# Patient Record
Sex: MALE | Employment: OTHER | URBAN - METROPOLITAN AREA
[De-identification: names, ages, dates, MRNs, and addresses within clinical notes are randomized per-mention and may not be internally consistent; named-entity substitution may affect disease eponyms.]

---

## 2017-06-07 DIAGNOSIS — C04.9 FLOOR OF MOUTH SQUAMOUS CELL CARCINOMA (HCC): Primary | ICD-10-CM

## 2017-09-28 ENCOUNTER — HOSPITAL ENCOUNTER (INPATIENT)
Age: 77
LOS: 5 days | Discharge: HOME | DRG: 847 | End: 2017-10-03
Attending: INTERNAL MEDICINE | Admitting: INTERNAL MEDICINE
Payer: MEDICARE

## 2017-09-28 ENCOUNTER — APPOINTMENT (OUTPATIENT)
Dept: GENERAL RADIOLOGY | Age: 77
DRG: 847 | End: 2017-09-28
Attending: INTERNAL MEDICINE
Payer: MEDICARE

## 2017-09-28 PROBLEM — C76.0 HEAD AND NECK CANCER (HCC): Status: ACTIVE | Noted: 2017-09-28

## 2017-09-28 LAB
ALBUMIN SERPL-MCNC: 3.1 G/DL (ref 3.5–5)
ALBUMIN/GLOB SERPL: 0.6 {RATIO} (ref 1.1–2.2)
ALP SERPL-CCNC: 109 U/L (ref 45–117)
ALT SERPL-CCNC: 21 U/L (ref 12–78)
ANION GAP SERPL CALC-SCNC: 6 MMOL/L (ref 5–15)
AST SERPL-CCNC: 16 U/L (ref 15–37)
BASOPHILS # BLD: 0 K/UL (ref 0–0.1)
BASOPHILS NFR BLD: 0 % (ref 0–1)
BILIRUB SERPL-MCNC: 0.3 MG/DL (ref 0.2–1)
BUN SERPL-MCNC: 32 MG/DL (ref 6–20)
BUN/CREAT SERPL: 15 (ref 12–20)
CALCIUM SERPL-MCNC: 9.2 MG/DL (ref 8.5–10.1)
CHLORIDE SERPL-SCNC: 104 MMOL/L (ref 97–108)
CO2 SERPL-SCNC: 27 MMOL/L (ref 21–32)
CREAT SERPL-MCNC: 2.12 MG/DL (ref 0.7–1.3)
EOSINOPHIL # BLD: 0.2 K/UL (ref 0–0.4)
EOSINOPHIL NFR BLD: 4 % (ref 0–7)
ERYTHROCYTE [DISTWIDTH] IN BLOOD BY AUTOMATED COUNT: 13.4 % (ref 11.5–14.5)
GLOBULIN SER CALC-MCNC: 5.2 G/DL (ref 2–4)
GLUCOSE BLD STRIP.AUTO-MCNC: 157 MG/DL (ref 65–100)
GLUCOSE BLD STRIP.AUTO-MCNC: 266 MG/DL (ref 65–100)
GLUCOSE SERPL-MCNC: 122 MG/DL (ref 65–100)
HCT VFR BLD AUTO: 33.3 % (ref 36.6–50.3)
HGB BLD-MCNC: 10.8 G/DL (ref 12.1–17)
LYMPHOCYTES # BLD: 1.5 K/UL (ref 0.8–3.5)
LYMPHOCYTES NFR BLD: 23 % (ref 12–49)
MCH RBC QN AUTO: 30.6 PG (ref 26–34)
MCHC RBC AUTO-ENTMCNC: 32.4 G/DL (ref 30–36.5)
MCV RBC AUTO: 94.3 FL (ref 80–99)
MONOCYTES # BLD: 0.6 K/UL (ref 0–1)
MONOCYTES NFR BLD: 10 % (ref 5–13)
NEUTS SEG # BLD: 4 K/UL (ref 1.8–8)
NEUTS SEG NFR BLD: 63 % (ref 32–75)
PLATELET # BLD AUTO: 246 K/UL (ref 150–400)
POTASSIUM SERPL-SCNC: 5 MMOL/L (ref 3.5–5.1)
PROT SERPL-MCNC: 8.3 G/DL (ref 6.4–8.2)
RBC # BLD AUTO: 3.53 M/UL (ref 4.1–5.7)
SERVICE CMNT-IMP: ABNORMAL
SERVICE CMNT-IMP: ABNORMAL
SODIUM SERPL-SCNC: 137 MMOL/L (ref 136–145)
WBC # BLD AUTO: 6.3 K/UL (ref 4.1–11.1)

## 2017-09-28 PROCEDURE — 02HV33Z INSERTION OF INFUSION DEVICE INTO SUPERIOR VENA CAVA, PERCUTANEOUS APPROACH: ICD-10-PCS | Performed by: INTERNAL MEDICINE

## 2017-09-28 PROCEDURE — 82962 GLUCOSE BLOOD TEST: CPT

## 2017-09-28 PROCEDURE — 85025 COMPLETE CBC W/AUTO DIFF WBC: CPT | Performed by: INTERNAL MEDICINE

## 2017-09-28 PROCEDURE — 76937 US GUIDE VASCULAR ACCESS: CPT

## 2017-09-28 PROCEDURE — 65270000029 HC RM PRIVATE

## 2017-09-28 PROCEDURE — 3E03305 INTRODUCTION OF OTHER ANTINEOPLASTIC INTO PERIPHERAL VEIN, PERCUTANEOUS APPROACH: ICD-10-PCS | Performed by: INTERNAL MEDICINE

## 2017-09-28 PROCEDURE — 36569 INSJ PICC 5 YR+ W/O IMAGING: CPT | Performed by: INTERNAL MEDICINE

## 2017-09-28 PROCEDURE — 80053 COMPREHEN METABOLIC PANEL: CPT | Performed by: INTERNAL MEDICINE

## 2017-09-28 PROCEDURE — 36415 COLL VENOUS BLD VENIPUNCTURE: CPT | Performed by: INTERNAL MEDICINE

## 2017-09-28 PROCEDURE — 77030020847 HC STATLOK BARD -A

## 2017-09-28 PROCEDURE — 74011000258 HC RX REV CODE- 258: Performed by: INTERNAL MEDICINE

## 2017-09-28 PROCEDURE — C1751 CATH, INF, PER/CENT/MIDLINE: HCPCS

## 2017-09-28 PROCEDURE — 74011250636 HC RX REV CODE- 250/636: Performed by: INTERNAL MEDICINE

## 2017-09-28 PROCEDURE — 74011250637 HC RX REV CODE- 250/637: Performed by: INTERNAL MEDICINE

## 2017-09-28 PROCEDURE — 77030020365 HC SOL INJ SOD CL 0.9% 50ML

## 2017-09-28 PROCEDURE — 71020 XR CHEST PA LAT: CPT

## 2017-09-28 PROCEDURE — 77030018719 HC DRSG PTCH ANTIMIC J&J -A

## 2017-09-28 RX ORDER — OXYCODONE AND ACETAMINOPHEN 5; 325 MG/1; MG/1
1 TABLET ORAL
Status: DISCONTINUED | OUTPATIENT
Start: 2017-09-28 | End: 2017-10-03 | Stop reason: HOSPADM

## 2017-09-28 RX ORDER — PANTOPRAZOLE SODIUM 40 MG/1
40 TABLET, DELAYED RELEASE ORAL DAILY
COMMUNITY

## 2017-09-28 RX ORDER — GRANISETRON HYDROCHLORIDE 1 MG/ML
1 INJECTION INTRAVENOUS ONCE
Status: COMPLETED | OUTPATIENT
Start: 2017-09-28 | End: 2017-09-28

## 2017-09-28 RX ORDER — AMOXICILLIN 250 MG
2 CAPSULE ORAL EVERY 12 HOURS
Status: DISCONTINUED | OUTPATIENT
Start: 2017-09-28 | End: 2017-10-03 | Stop reason: HOSPADM

## 2017-09-28 RX ORDER — POTASSIUM CHLORIDE 750 MG/1
20 TABLET, FILM COATED, EXTENDED RELEASE ORAL DAILY
Status: DISCONTINUED | OUTPATIENT
Start: 2017-09-29 | End: 2017-10-03 | Stop reason: HOSPADM

## 2017-09-28 RX ORDER — MORPHINE SULFATE 4 MG/ML
4 INJECTION, SOLUTION INTRAMUSCULAR; INTRAVENOUS
Status: DISCONTINUED | OUTPATIENT
Start: 2017-09-28 | End: 2017-10-03 | Stop reason: HOSPADM

## 2017-09-28 RX ORDER — SODIUM CHLORIDE 0.9 % (FLUSH) 0.9 %
10 SYRINGE (ML) INJECTION EVERY 8 HOURS
Status: DISCONTINUED | OUTPATIENT
Start: 2017-09-28 | End: 2017-10-03 | Stop reason: HOSPADM

## 2017-09-28 RX ORDER — ISOSORBIDE MONONITRATE 20 MG/1
20 TABLET ORAL 2 TIMES DAILY
COMMUNITY

## 2017-09-28 RX ORDER — ENOXAPARIN SODIUM 100 MG/ML
40 INJECTION SUBCUTANEOUS DAILY
Status: DISCONTINUED | OUTPATIENT
Start: 2017-09-28 | End: 2017-09-28

## 2017-09-28 RX ORDER — PANTOPRAZOLE SODIUM 40 MG/1
40 TABLET, DELAYED RELEASE ORAL
Status: DISCONTINUED | OUTPATIENT
Start: 2017-09-29 | End: 2017-10-03 | Stop reason: HOSPADM

## 2017-09-28 RX ORDER — FUROSEMIDE 40 MG/1
40 TABLET ORAL 2 TIMES DAILY
Status: DISCONTINUED | OUTPATIENT
Start: 2017-09-28 | End: 2017-10-03 | Stop reason: HOSPADM

## 2017-09-28 RX ORDER — GABAPENTIN 100 MG/1
100 CAPSULE ORAL 3 TIMES DAILY
Status: DISCONTINUED | OUTPATIENT
Start: 2017-09-28 | End: 2017-10-03 | Stop reason: HOSPADM

## 2017-09-28 RX ORDER — SODIUM CHLORIDE 0.9 % (FLUSH) 0.9 %
20 SYRINGE (ML) INJECTION AS NEEDED
Status: DISCONTINUED | OUTPATIENT
Start: 2017-09-28 | End: 2017-10-03 | Stop reason: HOSPADM

## 2017-09-28 RX ORDER — CARVEDILOL 12.5 MG/1
12.5 TABLET ORAL 2 TIMES DAILY WITH MEALS
COMMUNITY

## 2017-09-28 RX ORDER — CARVEDILOL 12.5 MG/1
12.5 TABLET ORAL 2 TIMES DAILY WITH MEALS
Status: DISCONTINUED | OUTPATIENT
Start: 2017-09-28 | End: 2017-10-03 | Stop reason: HOSPADM

## 2017-09-28 RX ORDER — ONDANSETRON 2 MG/ML
8 INJECTION INTRAMUSCULAR; INTRAVENOUS
Status: DISCONTINUED | OUTPATIENT
Start: 2017-09-28 | End: 2017-10-03 | Stop reason: HOSPADM

## 2017-09-28 RX ORDER — SODIUM CHLORIDE 0.9 % (FLUSH) 0.9 %
10 SYRINGE (ML) INJECTION EVERY 24 HOURS
Status: DISCONTINUED | OUTPATIENT
Start: 2017-09-28 | End: 2017-10-03 | Stop reason: HOSPADM

## 2017-09-28 RX ORDER — BACITRACIN 500 UNIT/G
1 PACKET (EA) TOPICAL AS NEEDED
Status: DISCONTINUED | OUTPATIENT
Start: 2017-09-28 | End: 2017-10-03 | Stop reason: HOSPADM

## 2017-09-28 RX ORDER — GLIMEPIRIDE 2 MG/1
4 TABLET ORAL 2 TIMES DAILY WITH MEALS
Status: DISCONTINUED | OUTPATIENT
Start: 2017-09-28 | End: 2017-10-03 | Stop reason: HOSPADM

## 2017-09-28 RX ORDER — POTASSIUM CHLORIDE 750 MG/1
20 TABLET, FILM COATED, EXTENDED RELEASE ORAL
COMMUNITY
End: 2017-10-06

## 2017-09-28 RX ORDER — ISOSORBIDE MONONITRATE 20 MG/1
20 TABLET ORAL 2 TIMES DAILY
Status: DISCONTINUED | OUTPATIENT
Start: 2017-09-28 | End: 2017-10-03 | Stop reason: HOSPADM

## 2017-09-28 RX ORDER — SODIUM CHLORIDE 0.9 % (FLUSH) 0.9 %
10 SYRINGE (ML) INJECTION AS NEEDED
Status: DISCONTINUED | OUTPATIENT
Start: 2017-09-28 | End: 2017-10-03 | Stop reason: HOSPADM

## 2017-09-28 RX ORDER — LANOLIN ALCOHOL/MO/W.PET/CERES
400 CREAM (GRAM) TOPICAL DAILY
Status: DISCONTINUED | OUTPATIENT
Start: 2017-09-29 | End: 2017-10-03 | Stop reason: HOSPADM

## 2017-09-28 RX ORDER — SIMVASTATIN 20 MG/1
40 TABLET, FILM COATED ORAL
Status: DISCONTINUED | OUTPATIENT
Start: 2017-09-28 | End: 2017-10-03 | Stop reason: HOSPADM

## 2017-09-28 RX ORDER — GABAPENTIN 100 MG/1
100 CAPSULE ORAL 3 TIMES DAILY
COMMUNITY

## 2017-09-28 RX ORDER — SODIUM CHLORIDE 9 MG/ML
100 INJECTION, SOLUTION INTRAVENOUS CONTINUOUS
Status: DISCONTINUED | OUTPATIENT
Start: 2017-09-28 | End: 2017-10-03 | Stop reason: HOSPADM

## 2017-09-28 RX ORDER — SIMVASTATIN 40 MG/1
40 TABLET, FILM COATED ORAL
COMMUNITY
End: 2017-10-13

## 2017-09-28 RX ORDER — LANOLIN ALCOHOL/MO/W.PET/CERES
400 CREAM (GRAM) TOPICAL DAILY
COMMUNITY
End: 2017-10-13

## 2017-09-28 RX ORDER — GLIMEPIRIDE 4 MG/1
4 TABLET ORAL 2 TIMES DAILY
COMMUNITY

## 2017-09-28 RX ORDER — ENOXAPARIN SODIUM 100 MG/ML
30 INJECTION SUBCUTANEOUS DAILY
Status: DISCONTINUED | OUTPATIENT
Start: 2017-09-29 | End: 2017-09-29

## 2017-09-28 RX ORDER — FUROSEMIDE 40 MG/1
40 TABLET ORAL 2 TIMES DAILY
COMMUNITY

## 2017-09-28 RX ADMIN — SIMVASTATIN 40 MG: 20 TABLET, FILM COATED ORAL at 21:29

## 2017-09-28 RX ADMIN — SODIUM CHLORIDE 100 ML/HR: 900 INJECTION, SOLUTION INTRAVENOUS at 14:53

## 2017-09-28 RX ADMIN — Medication 10 ML: at 21:29

## 2017-09-28 RX ADMIN — FLUOROURACIL 2050 MG: 50 INJECTION, SOLUTION INTRAVENOUS at 18:50

## 2017-09-28 RX ADMIN — CARVEDILOL 12.5 MG: 12.5 TABLET, FILM COATED ORAL at 16:59

## 2017-09-28 RX ADMIN — OXYCODONE HYDROCHLORIDE AND ACETAMINOPHEN 1 TABLET: 5; 325 TABLET ORAL at 21:29

## 2017-09-28 RX ADMIN — FUROSEMIDE 40 MG: 40 TABLET ORAL at 12:21

## 2017-09-28 RX ADMIN — GABAPENTIN 100 MG: 100 CAPSULE ORAL at 14:56

## 2017-09-28 RX ADMIN — DEXAMETHASONE SODIUM PHOSPHATE 20 MG: 4 INJECTION, SOLUTION INTRAMUSCULAR; INTRAVENOUS at 15:53

## 2017-09-28 RX ADMIN — CARBOPLATIN 300 MG: 10 INJECTION, SOLUTION INTRAVENOUS at 18:02

## 2017-09-28 RX ADMIN — DOCUSATE SODIUM AND SENNOSIDES 2 TABLET: 8.6; 5 TABLET, FILM COATED ORAL at 21:29

## 2017-09-28 RX ADMIN — GRANISETRON HYDROCHLORIDE 1 MG: 1 INJECTION INTRAVENOUS at 15:48

## 2017-09-28 RX ADMIN — ISOSORBIDE MONONITRATE 20 MG: 20 TABLET ORAL at 17:52

## 2017-09-28 RX ADMIN — GABAPENTIN 100 MG: 100 CAPSULE ORAL at 21:29

## 2017-09-28 RX ADMIN — Medication 10 ML: at 14:54

## 2017-09-28 RX ADMIN — OXYCODONE HYDROCHLORIDE AND ACETAMINOPHEN 1 TABLET: 5; 325 TABLET ORAL at 12:44

## 2017-09-28 RX ADMIN — ENOXAPARIN SODIUM 40 MG: 40 INJECTION SUBCUTANEOUS at 14:57

## 2017-09-28 RX ADMIN — GLIMEPIRIDE 4 MG: 2 TABLET ORAL at 16:59

## 2017-09-28 RX ADMIN — Medication 10 ML: at 14:53

## 2017-09-28 RX ADMIN — DOCETAXEL ANHYDROUS 150 MG: 10 INJECTION, SOLUTION INTRAVENOUS at 16:48

## 2017-09-28 NOTE — H&P
Pt. Is a 68 y/o male with a neglected Head and neck cancer  Who is admitted for his first cycle of chemo  Pt.  Examined,H&P to follow  Barb Mcghee MD

## 2017-09-28 NOTE — CONSULTS
[address] [facility] 12 [address] [facility]   [facility]       Name:  Harlan Jon   MR#:  [id number]   :  1940   Account #:  [de-identified]    Date of Consultation:  2017   Date of Adm:  2017       HISTORY OF PRESENT ILLNESS: The patient is a 60-year-old   gentleman with squamous cell carcinoma of the base of tongue,   diagnosed in 2017. He was admitted for initial chemotherapy. This   patient was originally diagnosed at [facility] and was   encouraged to have a radical resection and the patient refused. He   then was sent for radiotherapy consultation at [facility], but due to the   distance involved, he held off on pursuing this. He then was seen by   Dr. Arnaldo Villafana in [place], [place] in 2017 and was encouraged to have a   dental evaluation in preparation for radiation therapy; however, the   patient did not get this done. I was asked to see the patient after he   saw an oncologist in [place], who apparently did not offer any   treatment to the patient. We saw this gentleman first on 2017. I   spoke with Dr. Arnaldo Villafana, and we arranged for combined chemo and radiation   therapy, but the patient dragged his feet with the dental evaluation, and   it became clear that we were not going to be able to get the radiation   done at all, because of this one issue. He is now admitted for systemic   chemotherapy with the hopes of shrinking this lesion, gaining control   over the cancer and eventually 1 day and moving forward with the   definitive radiation. His most recent PET scan dated 2017 failed to   show disease outside of the right neck. PAST MEDICAL HISTORY: Significant for hypertension, diabetes,   coronary artery disease, congestive heart failure, chronic renal   insufficiency, hypercholesterolemia. PAST SURGICAL HISTORY: He has had an exploratory laparotomy   performed in [place] 2 years ago. He had a colonoscopy   several years ago.  He has had a biopsy of the right base of tongue in   01/2017. SOCIAL HISTORY: He lives in [place], [place]. He is  with 5   children. He does not drink. He is a less than 1 pack per day smoker. FAMILY HISTORY: His mother had colon cancer and his sister had   breast cancer. REVIEW OF SYSTEMS   He does have a right neck discomfort for which he uses hydrocodone   p.r.n. No difficulty swallowing. Full review of systems otherwise   noncontributory. PHYSICAL EXAMINATION   GENERAL: He is a pleasant, well-developed, well-nourished male in   no apparent distress. VITAL SIGNS: Temperature 97, pulse 57, /76, respirations 17,   O2 saturation 99%. HEENT: EOMI. Poor dentition noted. He has a palpable mass in the   right neck, which is fixed, measuring approximately 6 x 6 cm. NECK: Supple. LUNGS: Clear. CARDIAC: Regular rate and rhythm, 1/6 systolic murmur. ABDOMEN: Scaphoid, nontender. No hepatosplenomegaly noted. EXTREMITIES: No edema. NEUROLOGIC: AO x3, nonfocal.    IMPRESSION: Squamous carcinoma of the base of tongue, diagnosed   in 01/2017, locally advanced on PET scan, stage ABELARDO. As noted above,   for multiple different reasons, this gentleman has not been able to get   treated until now. We will move forward with Taxotere, carboplatin and   5-FU at a dose reduction. I discussed the potential side effects of this   regimen in detail with the patient as a well outpatient and we discussed   those again today. We will  premedicated and hydrated him in the   usual fashion. We will give him antiemetics prior to beginning treatment   and then daily. He will have daily labs drawn. We will resume his home   medications.         MD JANIE Richter / SHAHBAZ   D:  09/28/2017   12:49   T:  09/28/2017   13:18   Job #:  [id number]

## 2017-09-28 NOTE — PROCEDURES
PICC Placement Note    PRE-PROCEDURE VERIFICATION  Correct Procedure: yes  Correct Site:  yes  Temperature: Temp: 97.5 °F (36.4 °C), Temperature Source: Temp Source: Oral  Recent Labs      09/28/17   0943   BUN  32*   CREA  2.12*   PLT  246   WBC  6.3       Allergies: Review of patient's allergies indicates no known allergies. Education materials, including PICC Booklet and written information regarding central catheter related bloodstream infection and prevention given to patient. See Patient Education activity for further details. PROCEDURE DETAIL  A double lumen power injectable PICC line was placed for chemo therapy. The following documentation is in addition to the PICC properties in the lines/airways flowsheet :  Lot #: [id number]  Xylocaine 1% used intradermally:  yes  Total Catheter Length: 37 (cm)  Internal Catheter Length: 37 (cm)  Vein Selection for PICC: right basilic  Central Line Bundle followed: yes  Complication Related to Insertion: none    The placement was verified by ECG technology. The PICC is on the right side and the tip overlies the superior vena cava. ECG verification documentation is on the patient's paper chart. Line is ready for immediate use. Report to Albertina Hastings.     KOBI MathurN, RN, VA-BC   Vascular Access Team

## 2017-09-28 NOTE — IP AVS SNAPSHOT
[address] 
[phone] Patient: Raymon Holcomb MRN: [id number] PWJ:[birth year] You are allergic to the following No active allergies Recent Documentation Height Weight BMI  
  
  
 1.753 m 84.8 kg 27.62 kg/m2 Emergency Contacts  (Rel.) Home Phone Work Phone Mobile Phone Jose Duvall (Child) -- -- [phone] About your hospitalization You were admitted on:  September 28, 2017 You last received care in the:  [facility] You were discharged on:  October 3, 2017 Why you were hospitalized Your primary diagnosis was:  Not on File Your diagnoses also included:  Head And Neck Cancer (Hcc) Providers Seen During Your Hospitalizations Provider Role Specialty Primary office phone Jacqui Solano MD Attending Provider Hematology and Oncology [phone] Your Primary Care Physician (PCP) Primary Care Physician Office Phone Office Fax [address] [email or web address] [phone] Follow-up Information Follow up With Details Comments Contact Info Mario Liu, [address] [facility] [address] [phone] Jacqui Solano MD In 1 week  [address] [address] [phone] Current Discharge Medication List  
  
CONTINUE these medications which have NOT CHANGED Dose & Instructions Dispensing Information Comments Morning Noon Evening Bedtime  
 carvedilol 12.5 mg tablet Commonly known as:  Nicko Gut Your last dose was: Your next dose is:    
   
   
 Dose:  12.5 mg Take 12.5 mg by mouth two (2) times daily (with meals). Refills:  0  
     
   
   
   
  
 furosemide 40 mg tablet Commonly known as:  LASIX Your last dose was:     
   
Your next dose is:    
   
   
 Dose:  40 mg  
 Take 40 mg by mouth two (2) times a day. One tablet in the morning, one tablet at noon Refills:  0  
     
   
   
   
  
 gabapentin 100 mg capsule Commonly known as:  NEURONTIN Your last dose was: Your next dose is:    
   
   
 Dose:  100 mg Take 100 mg by mouth three (3) times daily. Refills:  0  
     
   
   
   
  
 glimepiride 4 mg tablet Commonly known as:  AMARYL Your last dose was: Your next dose is:    
   
   
 Dose:  4 mg Take 4 mg by mouth two (2) times a day. One with breakfast, one with dinner Refills:  0  
     
   
   
   
  
 isosorbide mononitrate 20 mg tablet Commonly known as:  ISMO, MONOKET Your last dose was: Your next dose is:    
   
   
 Dose:  20 mg Take 20 mg by mouth two (2) times a day. Refills:  0  
     
   
   
   
  
 magnesium oxide 400 mg tablet Commonly known as:  MAG-OX Your last dose was: Your next dose is:    
   
   
 Dose:  400 mg Take 400 mg by mouth daily. Refills:  0  
     
   
   
   
  
 pantoprazole 40 mg tablet Commonly known as:  PROTONIX Your last dose was: Your next dose is:    
   
   
 Dose:  40 mg Take 40 mg by mouth daily. Refills:  0  
     
   
   
   
  
 potassium chloride SR 10 mEq tablet Commonly known as:  KLOR-CON 10 Your last dose was: Your next dose is:    
   
   
 Dose:  20 mEq Take 20 mEq by mouth. Refills:  0  
     
   
   
   
  
 simvastatin 40 mg tablet Commonly known as:  ZOCOR Your last dose was: Your next dose is:    
   
   
 Dose:  40 mg Take 40 mg by mouth nightly. Refills:  0 SPIRIVA WITH HANDIHALER 18 mcg inhalation capsule Generic drug:  tiotropium Your last dose was: Your next dose is:    
   
   
 Dose:  1 Cap Take 1 Cap by inhalation daily. Refills:  0 Discharge Instructions Patient Discharge Instructions Bernadette Haider / [id number] : 1940 Admitted 2017 Discharged: 10/3/2017 DISCHARGE DIAGNOSIS:  
Active Problems: 
  Head and neck cancer ([address] (2017) Take Home Medications · It is important that you take the medication exactly as they are prescribed. · Keep your medication in the bottles provided by the pharmacist and keep a list of the medication names, dosages, and times to be taken in your wallet. · Do not take other medications without consulting your doctor. What to do at [facility] 1. Recommended diet: regular 2. Recommended activity: activity as tolerated 3. If you experience any of the following symptoms then please call your primary care physician or return to the emergency room if you cannot get hold of your doctor: Follow-up with:  
[facility] for [facility] tomorrow 1:30 (Check in at the  at [address] shot is pending insurance authorization; Please call Fiorella De La Cruz at Dr. Shobha Hopkins office at [phone], press [phone], by 11am on 10/4/17 to check insurance authorization status) Dr. Shobha Hopkins office in one week. Please call for your own appointment [phone]. Information obtained by : 
I understand that if any problems occur once I am at home I am to contact my physician. I understand and acknowledge receipt of the instructions indicated above. Physician's or R.N.'s Signature                                                                  Date/Time Patient or Representative Signature                                                          Date/Time 
 
Nutrition Recommendations for Discharge: Continue Oral Nutrition Supplements at discharge: Ensure Enlive or Ensure Plus 4 bottles per day  
for 30 days unless otherwise directed by your Primary Care Physician. This product can be purchased at your local grocery store or drug store and online. Generic brands of Adult High Calorie Nutrition Shakes are acceptable and found in most pharmacies, [employer] and [employer]. [place] RD, MS, CDE 
 _ Discharge Instructions for Chemotherapy/Biotherapy Chemotherapy has the potential to cause many side effects. The following are general precautions that chemo patients should take: 1. Practice good hand washing: ? Use soap and water for at least 15 seconds, covering all areas of hands. ? Always wash hands before eating. ? Wash hands after contact with public surfaces such as door knobs and handles, shopping carts, telephones and elevator buttons. 2. Get plenty of rest:  
? You will likely experience fatigue three to five days following your treatment. It may last as long as seven days. 3. Drink plenty of fluids. Water is best.  
4. Eat a well balanced diet:  
? Small frequent meals may help if you are having trouble with nausea or your appetite. Some people also do well with nutritional supplements. 5. Pace yourself with daily activities:  
? Take frequent breaks and ask for help if you need it. 6. Exercise is very important:  
? It will increase circulation and will help the fatigue. Do what you can each day. 7. If your regimen results in hair loss:  
? You will likely notice effects between two and three weeks following your first treatment. Some lose all hair while others only experience thinning. 8. Practice good oral hygiene:  
? Notify your M.D. immediately if any mouth sores or discomfort develop. 9. Protect yourself from the sun.   
 
Signs/Symptoms of an allergic reaction and/or some side effects may require immediate medical attention. Notify your physician if you develop one or more of the following:  
Temperature of 100.5 degrees or greater;  
Skin redness, itching, swelling, blistering, weeping, crusting, rash, or hives; Wheezing, chest tightness, cough, or shortness of breath;  
Swelling of the face, eyelids, lips, tongue, or throat;  
Severe, persistent headache;  
Stuffy nose, runny nose, sneezing;  
Red (bloodshot), itchy, swollen, or watery eyes;  
Stomach pain, nausea, vomiting, diarrhea, or bloody stools;  
Mouth sores Your physician should also be aware of the following symptoms:  
Persistent and unresolved nausea and/or vomiting;  
Persistent and unresolved diarrhea or constipation;  
Numbness/tingling/burning of the extremities, including the fingers and toes; Bleeding or unexplained bruising; Unexplained redness/swelling/pain in the arms or legs; Shortness of breath or fatigue that worsens;  
Pain with urination or blood in the urine; Chills;  
Cough, especially a productive cough;  
Mouth sores or a white coating of the tongue; Redness, swelling, pain or drainage at the port-a-cath or IV site; Increased feeling of bloating or water retention; Excessive weight loss or gain;  
Ringing in the ears; Difficulty swallowing;  
Dizziness, vertigo, lightheadedness, or fainting. Chemotherapy can cause birth defects. It is very important not to become pregnant  
or father a child while undergoing chemotherapy. During chemotherapy and for 48 hours after chemotherapy, the drugs may still be in  
your urine and other body fluids. You should use the following precautions to reduce  
exposure to others: * Both men and women should sit on the toilet to cut down on splashing. Flush   toilets with the lid down. Always wash your hands well with soap and water after using the toilet. You may want to use a separate toilet if possible. * Caregivers should wear disposable gloves to handle body wastes. Dispose of  
gloves after each use and wash hands. * Any sheets or clothes soiled with bodily fluids should be machine washed separately from other laundry. Wash twice with regular laundry detergent in hot water. If they cannot be washed right away they can be stored in a sealed plastic  bag.   
* If disposable adult diapers, underwear, or sanitary pads are used, they can be sealed in a plastic bag, and thrown away with regular trash. Discharge Orders None [employer] Announcement We are excited to announce that we are making your provider's discharge notes available to you in PlayMotion. You will see these notes when they are completed and signed by the physician that discharged you from your recent hospital stay. If you have any questions or concerns about any information you see in PlayMotion, please call the Health Information Department where you were seen or reach out to your Primary Care Provider for more information about your plan of care. Introducing [facility]! Felipa French introduces PlayMotion patient portal. Now you can access parts of your medical record, email your doctor's office, and request medication refills online. 1. In your internet browser, go to [email or web address] 2. Click on the First Time User? Click Here link in the Sign In box. You will see the New Member Sign Up page. 3. Enter your PlayMotion Access Code exactly as it appears below. You will not need to use this code after youve completed the sign-up process. If you do not sign up before the expiration date, you must request a new code. · PlayMotion Access Code: [email or web address] Expires: 12/27/2017  3:26 AM 
 
4. Enter the last four digits of your Social Security Number (xxxx) and Date of Birth (mm/dd/yyyy) as indicated and click Submit. You will be taken to the next sign-up page. 5. Create a Giggzo ID. This will be your Giggzo login ID and cannot be changed, so think of one that is secure and easy to remember. 6. Create a Giggzo password. You can change your password at any time. 7. Enter your Password Reset Question and Answer. This can be used at a later time if you forget your password. 8. Enter your e-mail address. You will receive e-mail notification when new information is available in [address] 9. Click Sign Up. You can now view and download portions of your medical record. 10. Click the Download Summary menu link to download a portable copy of your medical information. If you have questions, please visit the Frequently Asked Questions section of the Giggzo website. Remember, Giggzo is NOT to be used for urgent needs. For medical emergencies, dial 911. Now available from your iPhone and Android! General Information Please provide this summary of care documentation to your next provider. Patient Signature:  ____________________________________________________________ Date:  ____________________________________________________________  
  
Rich Artist Provider Signature:  ____________________________________________________________ Date:  ____________________________________________________________

## 2017-09-28 NOTE — PROGRESS NOTES
Luke Jackman RN and Neel Holbrook RN performed a dual skin assessment on this patient No impairment noted  Benny score is 20    Patient has midline abdominal scar. Small lesions noted to back and chest area.

## 2017-09-28 NOTE — PROGRESS NOTES
Primary Nurse Fredy Roberson RN and Israel Altamirano RN performed a dual skin assessment on this patient No impairment noted  Benny score is 20    Old shingles scarring to buttocks  Scar to mid back  Scar to left upper back  Scar to midline abdomen

## 2017-09-29 LAB
ALBUMIN SERPL-MCNC: 2.3 G/DL (ref 3.5–5)
ALBUMIN/GLOB SERPL: 0.7 {RATIO} (ref 1.1–2.2)
ALP SERPL-CCNC: 90 U/L (ref 45–117)
ALT SERPL-CCNC: 19 U/L (ref 12–78)
ANION GAP SERPL CALC-SCNC: 9 MMOL/L (ref 5–15)
AST SERPL-CCNC: 16 U/L (ref 15–37)
BILIRUB SERPL-MCNC: 0.2 MG/DL (ref 0.2–1)
BUN SERPL-MCNC: 28 MG/DL (ref 6–20)
BUN/CREAT SERPL: 20 (ref 12–20)
CALCIUM SERPL-MCNC: 7.2 MG/DL (ref 8.5–10.1)
CHLORIDE SERPL-SCNC: 112 MMOL/L (ref 97–108)
CO2 SERPL-SCNC: 21 MMOL/L (ref 21–32)
CREAT SERPL-MCNC: 1.41 MG/DL (ref 0.7–1.3)
ERYTHROCYTE [DISTWIDTH] IN BLOOD BY AUTOMATED COUNT: 13.2 % (ref 11.5–14.5)
FERRITIN SERPL-MCNC: 190 NG/ML (ref 26–388)
GLOBULIN SER CALC-MCNC: 3.4 G/DL (ref 2–4)
GLUCOSE BLD STRIP.AUTO-MCNC: 147 MG/DL (ref 65–100)
GLUCOSE BLD STRIP.AUTO-MCNC: 160 MG/DL (ref 65–100)
GLUCOSE BLD STRIP.AUTO-MCNC: 209 MG/DL (ref 65–100)
GLUCOSE BLD STRIP.AUTO-MCNC: 250 MG/DL (ref 65–100)
GLUCOSE SERPL-MCNC: 210 MG/DL (ref 65–100)
HAPTOGLOB SERPL-MCNC: 337 MG/DL (ref 30–200)
HCT VFR BLD AUTO: 27.4 % (ref 36.6–50.3)
HEMOCCULT STL QL: NEGATIVE
HGB BLD-MCNC: 9 G/DL (ref 12.1–17)
IRON SATN MFR SERPL: 45 % (ref 20–50)
IRON SERPL-MCNC: 110 UG/DL (ref 35–150)
LDH SERPL L TO P-CCNC: 133 U/L (ref 85–241)
MCH RBC QN AUTO: 30.6 PG (ref 26–34)
MCHC RBC AUTO-ENTMCNC: 32.8 G/DL (ref 30–36.5)
MCV RBC AUTO: 93.2 FL (ref 80–99)
PLATELET # BLD AUTO: 183 K/UL (ref 150–400)
POTASSIUM SERPL-SCNC: 4.3 MMOL/L (ref 3.5–5.1)
PROT SERPL-MCNC: 5.7 G/DL (ref 6.4–8.2)
RBC # BLD AUTO: 2.94 M/UL (ref 4.1–5.7)
RETICS/RBC NFR AUTO: 1.1 % (ref 0.7–2.1)
SERVICE CMNT-IMP: ABNORMAL
SODIUM SERPL-SCNC: 142 MMOL/L (ref 136–145)
TIBC SERPL-MCNC: 242 UG/DL (ref 250–450)
WBC # BLD AUTO: 2.8 K/UL (ref 4.1–11.1)

## 2017-09-29 PROCEDURE — 83010 ASSAY OF HAPTOGLOBIN QUANT: CPT | Performed by: INTERNAL MEDICINE

## 2017-09-29 PROCEDURE — 84165 PROTEIN E-PHORESIS SERUM: CPT | Performed by: INTERNAL MEDICINE

## 2017-09-29 PROCEDURE — 85027 COMPLETE CBC AUTOMATED: CPT | Performed by: INTERNAL MEDICINE

## 2017-09-29 PROCEDURE — 65270000029 HC RM PRIVATE

## 2017-09-29 PROCEDURE — 80053 COMPREHEN METABOLIC PANEL: CPT | Performed by: INTERNAL MEDICINE

## 2017-09-29 PROCEDURE — 85045 AUTOMATED RETICULOCYTE COUNT: CPT | Performed by: INTERNAL MEDICINE

## 2017-09-29 PROCEDURE — 83615 LACTATE (LD) (LDH) ENZYME: CPT | Performed by: INTERNAL MEDICINE

## 2017-09-29 PROCEDURE — 82272 OCCULT BLD FECES 1-3 TESTS: CPT | Performed by: INTERNAL MEDICINE

## 2017-09-29 PROCEDURE — C1758 CATHETER, URETERAL: HCPCS

## 2017-09-29 PROCEDURE — 82728 ASSAY OF FERRITIN: CPT | Performed by: INTERNAL MEDICINE

## 2017-09-29 PROCEDURE — 74011250637 HC RX REV CODE- 250/637: Performed by: INTERNAL MEDICINE

## 2017-09-29 PROCEDURE — 74011250636 HC RX REV CODE- 250/636: Performed by: INTERNAL MEDICINE

## 2017-09-29 PROCEDURE — 36415 COLL VENOUS BLD VENIPUNCTURE: CPT | Performed by: INTERNAL MEDICINE

## 2017-09-29 PROCEDURE — 77030010545

## 2017-09-29 PROCEDURE — 82962 GLUCOSE BLOOD TEST: CPT

## 2017-09-29 PROCEDURE — 83540 ASSAY OF IRON: CPT | Performed by: INTERNAL MEDICINE

## 2017-09-29 RX ORDER — ENOXAPARIN SODIUM 100 MG/ML
40 INJECTION SUBCUTANEOUS DAILY
Status: DISCONTINUED | OUTPATIENT
Start: 2017-09-29 | End: 2017-10-03 | Stop reason: HOSPADM

## 2017-09-29 RX ADMIN — Medication 10 ML: at 22:00

## 2017-09-29 RX ADMIN — GLIMEPIRIDE 4 MG: 2 TABLET ORAL at 16:36

## 2017-09-29 RX ADMIN — GABAPENTIN 100 MG: 100 CAPSULE ORAL at 21:03

## 2017-09-29 RX ADMIN — ISOSORBIDE MONONITRATE 20 MG: 20 TABLET ORAL at 09:12

## 2017-09-29 RX ADMIN — GABAPENTIN 100 MG: 100 CAPSULE ORAL at 09:00

## 2017-09-29 RX ADMIN — GLIMEPIRIDE 4 MG: 2 TABLET ORAL at 09:00

## 2017-09-29 RX ADMIN — GABAPENTIN 100 MG: 100 CAPSULE ORAL at 16:33

## 2017-09-29 RX ADMIN — CARVEDILOL 12.5 MG: 12.5 TABLET, FILM COATED ORAL at 16:36

## 2017-09-29 RX ADMIN — POTASSIUM CHLORIDE 20 MEQ: 750 TABLET, FILM COATED, EXTENDED RELEASE ORAL at 08:59

## 2017-09-29 RX ADMIN — SIMVASTATIN 40 MG: 20 TABLET, FILM COATED ORAL at 21:03

## 2017-09-29 RX ADMIN — DOCUSATE SODIUM AND SENNOSIDES 2 TABLET: 8.6; 5 TABLET, FILM COATED ORAL at 08:59

## 2017-09-29 RX ADMIN — Medication 400 MG: at 09:00

## 2017-09-29 RX ADMIN — FUROSEMIDE 40 MG: 40 TABLET ORAL at 09:00

## 2017-09-29 RX ADMIN — SODIUM CHLORIDE 100 ML/HR: 900 INJECTION, SOLUTION INTRAVENOUS at 00:53

## 2017-09-29 RX ADMIN — FLUOROURACIL 2050 MG: 50 INJECTION, SOLUTION INTRAVENOUS at 18:56

## 2017-09-29 RX ADMIN — SODIUM CHLORIDE 100 ML/HR: 900 INJECTION, SOLUTION INTRAVENOUS at 19:12

## 2017-09-29 RX ADMIN — Medication 10 ML: at 23:35

## 2017-09-29 RX ADMIN — OXYCODONE HYDROCHLORIDE AND ACETAMINOPHEN 1 TABLET: 5; 325 TABLET ORAL at 09:09

## 2017-09-29 RX ADMIN — Medication 10 ML: at 13:43

## 2017-09-29 RX ADMIN — OXYCODONE HYDROCHLORIDE AND ACETAMINOPHEN 1 TABLET: 5; 325 TABLET ORAL at 16:43

## 2017-09-29 RX ADMIN — CARVEDILOL 12.5 MG: 12.5 TABLET, FILM COATED ORAL at 09:00

## 2017-09-29 RX ADMIN — PANTOPRAZOLE SODIUM 40 MG: 40 TABLET, DELAYED RELEASE ORAL at 07:02

## 2017-09-29 RX ADMIN — ISOSORBIDE MONONITRATE 20 MG: 20 TABLET ORAL at 19:16

## 2017-09-29 RX ADMIN — Medication 10 ML: at 07:02

## 2017-09-29 RX ADMIN — FUROSEMIDE 40 MG: 40 TABLET ORAL at 12:45

## 2017-09-29 RX ADMIN — ENOXAPARIN SODIUM 40 MG: 40 INJECTION SUBCUTANEOUS at 09:18

## 2017-09-29 NOTE — DIABETES MGMT
DTC Progress Note    Recommendations/ Comments: Chart reviewed due to hyperglycemia. If appropriate, please consider ordering correction scale Humalog, high sensitivity and adding no concentrated sweets to diet order. Chart reviewed on Liu rAias. Patient is a 69 y.o. male with known diabetes on Amaryl 4 mg BID at home. A1c:   No results found for: HBA1C, HGBE8, VWA8QMRT    Recent Glucose Results: Lab Results   Component Value Date/Time     (H) 09/29/2017 04:00 AM    GLUCPOC 209 (H) 09/29/2017 06:47 AM    GLUCPOC 266 (H) 09/28/2017 09:14 PM    GLUCPOC 157 (H) 09/28/2017 04:39 PM        Lab Results   Component Value Date/Time    Creatinine 1.41 09/29/2017 04:00 AM     Estimated Creatinine Clearance: 44.6 mL/min (based on Cr of 1.41). Active Orders   Diet    DIET MECHANICAL SOFT        PO intake: Patient Vitals for the past 72 hrs:   % Diet Eaten   09/28/17 1506 50 %       Current hospital DM medication: Amaryl 4 mg BID     Will continue to follow as needed.     Thank you  Roberto Richards RD, CDE

## 2017-09-29 NOTE — PROGRESS NOTES
Hematology-Oncology Progress Note    Nadine Otoole  1940  [id number]  9/29/2017    Follow-up for: locally adv. H&N cancer                           chemotherapy     [x]        Chart notes since last visit reviewed   [x]        Medications reviewed for allergies and interactions       Case discussed with the following:         []                            [x]        Nursing Staff                                                                         []        Pathologist                                                                        []        FAMILY      Subjective:     Spoke with patient who complains of: did well with chemo yesterday, has a little discomfort in R neck o/w doing well    Objective:   Patient Vitals for the past 24 hrs:   BP Temp Pulse Resp SpO2   09/29/17 0821 198/86 97.8 °F (36.6 °C) 64 18 100 %   09/29/17 0116 175/81 97.6 °F (36.4 °C) 68 16 97 %   09/28/17 2125 180/73 97.9 °F (36.6 °C) 67 16 98 %   09/28/17 1605 160/79 97.6 °F (36.4 °C) (!) 55 16 98 %       REVIEW OF SYSTEMS:    Constitutional: negative fever, negative chills, negative weight loss  Eyes:   negative visual changes  ENT:   negative sore throat, tongue or lip swelling  Respiratory:  negative cough, negative dyspnea  Cards:  negative for chest pain, palpitations, lower extremity edema  GI:   negative for nausea, vomiting, diarrhea, and abdominal pain  Neuro:  negative for headaches, dizziness, vertigo  [x]                        Full ROS o/w normal/non contributor    Constitutional:  Patient looks  []        Sick  []        Frail  [x]        Better                                                 []        Depressed    HEENT:  [x]   NC                         []   AT               []    ALOPECIA           Eyes: [x]   Normal               []    Icteric  Oropharynx: poor dentition  Mucositis: [x]    None                 Grade: []        I  []        II  []        III  []        IV  Neck:   [x]   Supple []  Rigid               JVD:    [x]   ABSENT       []   PRESENT  Lymphadenopathy:   []   None Noted            [x]   PRESENT  5x5 cm fixed R neck mass    Chest:  [x]   Clear               []    Rhonchi                      Dec'd @     []  Right Base           []   Left Base    CV:             [x]   Regular              []  Irregular               []   Tachy                []   Murmur  Abdominal:   [x]    Soft              []   NON-tender               []   Tender      BS:    []   ABSENT                   []   PRESENT  Liver:     [x]  NON-palp                  []   EDGE- palp  Spleen: [x]   NON-palp                   []  EDGE - palp  Mass:   [x]   ABSENT                          []  PRESENT  Extr:    [x]  Lymphedema             []   Cyanosis      []  Clubbing  Edema:     [x]   NONE       []   PRESENT  Skin:  Intact [x]           Purpura []        Rash: [x]   ABSENT       []  PRESENT  Neuro:  [x]        Normal  []        Confused      Available labs reviewed:  Labs:    Recent Results (from the past 24 hour(s))   GLUCOSE, POC    Collection Time: 09/28/17  4:39 PM   Result Value Ref Range    Glucose (POC) 157 (H) 65 - 100 mg/dL    Performed by Corby Deleon    GLUCOSE, POC    Collection Time: 09/28/17  9:14 PM   Result Value Ref Range    Glucose (POC) 266 (H) 65 - 100 mg/dL    Performed by Crystal Contreras    CBC W/O DIFF    Collection Time: 09/29/17  4:00 AM   Result Value Ref Range    WBC 2.8 (L) 4.1 - 11.1 K/uL    RBC 2.94 (L) 4.10 - 5.70 M/uL    HGB 9.0 (L) 12.1 - 17.0 g/dL    HCT 27.4 (L) 36.6 - 50.3 %    MCV 93.2 80.0 - 99.0 FL    MCH 30.6 26.0 - 34.0 PG    MCHC 32.8 30.0 - 36.5 g/dL    RDW 13.2 11.5 - 14.5 %    PLATELET 346 109 - 209 K/uL   METABOLIC PANEL, COMPREHENSIVE    Collection Time: 09/29/17  4:00 AM   Result Value Ref Range    Sodium 142 136 - 145 mmol/L    Potassium 4.3 3.5 - 5.1 mmol/L    Chloride 112 (H) 97 - 108 mmol/L    CO2 21 21 - 32 mmol/L    Anion gap 9 5 - 15 mmol/L    Glucose 210 (H) 65 - 100 mg/dL    BUN 28 (H) 6 - 20 MG/DL    Creatinine 1.41 (H) 0.70 - 1.30 MG/DL    BUN/Creatinine ratio 20 12 - 20      GFR est AA 59 (L) >60 ml/min/1.73m2    GFR est non-AA 49 (L) >60 ml/min/1.73m2    Calcium 7.2 (L) 8.5 - 10.1 MG/DL    Bilirubin, total 0.2 0.2 - 1.0 MG/DL    ALT (SGPT) 19 12 - 78 U/L    AST (SGOT) 16 15 - 37 U/L    Alk. phosphatase 90 45 - 117 U/L    Protein, total 5.7 (L) 6.4 - 8.2 g/dL    Albumin 2.3 (L) 3.5 - 5.0 g/dL    Globulin 3.4 2.0 - 4.0 g/dL    A-G Ratio 0.7 (L) 1.1 - 2.2     GLUCOSE, POC    Collection Time: 09/29/17  6:47 AM   Result Value Ref Range    Glucose (POC) 209 (H) 65 - 100 mg/dL    Performed by Reggie Nageotte        Available Xrays reviewed:    Chemotherapy monitored and toxicities assessed:    Assessment and Plan   1. Locally adv. H&N cancer. .. Pt. Was unable to get dental work done and we decided to give \"induction\" therapy as a result. .. He is getting taxotere/carbo/5FU. .. Started on Thursday, doing well so far  2. Diabetes. .. Continue current mgmt. Perform acu-checks and prn insulin  3. Anemia. Jennifer Lynn ? etiology,, I will order labs and check stool studies, check daily cbc and add protonix    Landon Farris MD

## 2017-09-29 NOTE — PROGRESS NOTES
Bedside shift change report given to [address]  (oncoming nurse) by Burt Duenas RN (offgoing nurse). Report included the following information SBAR, Kardex, Intake/Output and Recent Results.

## 2017-09-30 LAB
ALBUMIN SERPL-MCNC: 2.6 G/DL (ref 3.5–5)
ALBUMIN/GLOB SERPL: 0.6 {RATIO} (ref 1.1–2.2)
ALP SERPL-CCNC: 88 U/L (ref 45–117)
ALT SERPL-CCNC: 23 U/L (ref 12–78)
ANION GAP SERPL CALC-SCNC: 8 MMOL/L (ref 5–15)
AST SERPL-CCNC: 21 U/L (ref 15–37)
BILIRUB SERPL-MCNC: 0.2 MG/DL (ref 0.2–1)
BUN SERPL-MCNC: 24 MG/DL (ref 6–20)
BUN/CREAT SERPL: 18 (ref 12–20)
CALCIUM SERPL-MCNC: 8.4 MG/DL (ref 8.5–10.1)
CHLORIDE SERPL-SCNC: 108 MMOL/L (ref 97–108)
CO2 SERPL-SCNC: 23 MMOL/L (ref 21–32)
CREAT SERPL-MCNC: 1.33 MG/DL (ref 0.7–1.3)
ERYTHROCYTE [DISTWIDTH] IN BLOOD BY AUTOMATED COUNT: 13.1 % (ref 11.5–14.5)
FOLATE SERPL-MCNC: 13.8 NG/ML (ref 5–21)
GLOBULIN SER CALC-MCNC: 4.4 G/DL (ref 2–4)
GLUCOSE BLD STRIP.AUTO-MCNC: 121 MG/DL (ref 65–100)
GLUCOSE BLD STRIP.AUTO-MCNC: 186 MG/DL (ref 65–100)
GLUCOSE BLD STRIP.AUTO-MCNC: 191 MG/DL (ref 65–100)
GLUCOSE SERPL-MCNC: 120 MG/DL (ref 65–100)
HCT VFR BLD AUTO: 32.3 % (ref 36.6–50.3)
HGB BLD-MCNC: 10.7 G/DL (ref 12.1–17)
MCH RBC QN AUTO: 30.3 PG (ref 26–34)
MCHC RBC AUTO-ENTMCNC: 33.1 G/DL (ref 30–36.5)
MCV RBC AUTO: 91.5 FL (ref 80–99)
PLATELET # BLD AUTO: 205 K/UL (ref 150–400)
POTASSIUM SERPL-SCNC: 3.9 MMOL/L (ref 3.5–5.1)
PROT SERPL-MCNC: 7 G/DL (ref 6.4–8.2)
RBC # BLD AUTO: 3.53 M/UL (ref 4.1–5.7)
SERVICE CMNT-IMP: ABNORMAL
SODIUM SERPL-SCNC: 139 MMOL/L (ref 136–145)
VIT B12 SERPL-MCNC: 545 PG/ML (ref 211–911)
WBC # BLD AUTO: 6.8 K/UL (ref 4.1–11.1)

## 2017-09-30 PROCEDURE — 82962 GLUCOSE BLOOD TEST: CPT

## 2017-09-30 PROCEDURE — 82746 ASSAY OF FOLIC ACID SERUM: CPT | Performed by: INTERNAL MEDICINE

## 2017-09-30 PROCEDURE — 74011250637 HC RX REV CODE- 250/637: Performed by: INTERNAL MEDICINE

## 2017-09-30 PROCEDURE — 65270000029 HC RM PRIVATE

## 2017-09-30 PROCEDURE — 82607 VITAMIN B-12: CPT | Performed by: INTERNAL MEDICINE

## 2017-09-30 PROCEDURE — 74011250636 HC RX REV CODE- 250/636: Performed by: INTERNAL MEDICINE

## 2017-09-30 PROCEDURE — 80053 COMPREHEN METABOLIC PANEL: CPT | Performed by: INTERNAL MEDICINE

## 2017-09-30 PROCEDURE — 85027 COMPLETE CBC AUTOMATED: CPT | Performed by: INTERNAL MEDICINE

## 2017-09-30 PROCEDURE — 36415 COLL VENOUS BLD VENIPUNCTURE: CPT | Performed by: INTERNAL MEDICINE

## 2017-09-30 RX ADMIN — CARVEDILOL 12.5 MG: 12.5 TABLET, FILM COATED ORAL at 07:03

## 2017-09-30 RX ADMIN — Medication 10 ML: at 03:42

## 2017-09-30 RX ADMIN — SODIUM CHLORIDE 100 ML/HR: 900 INJECTION, SOLUTION INTRAVENOUS at 03:41

## 2017-09-30 RX ADMIN — Medication 10 ML: at 11:57

## 2017-09-30 RX ADMIN — POTASSIUM CHLORIDE 20 MEQ: 750 TABLET, FILM COATED, EXTENDED RELEASE ORAL at 08:27

## 2017-09-30 RX ADMIN — DIPHENHYDRAMINE HYDROCHLORIDE 5 ML: 12.5 SOLUTION ORAL at 11:58

## 2017-09-30 RX ADMIN — OXYCODONE HYDROCHLORIDE AND ACETAMINOPHEN 1 TABLET: 5; 325 TABLET ORAL at 03:55

## 2017-09-30 RX ADMIN — ISOSORBIDE MONONITRATE 20 MG: 20 TABLET ORAL at 17:00

## 2017-09-30 RX ADMIN — SIMVASTATIN 40 MG: 20 TABLET, FILM COATED ORAL at 21:00

## 2017-09-30 RX ADMIN — FUROSEMIDE 40 MG: 40 TABLET ORAL at 11:57

## 2017-09-30 RX ADMIN — Medication 10 ML: at 22:16

## 2017-09-30 RX ADMIN — Medication 400 MG: at 08:27

## 2017-09-30 RX ADMIN — ISOSORBIDE MONONITRATE 20 MG: 20 TABLET ORAL at 10:16

## 2017-09-30 RX ADMIN — FLUOROURACIL 2050 MG: 50 INJECTION, SOLUTION INTRAVENOUS at 18:35

## 2017-09-30 RX ADMIN — FUROSEMIDE 40 MG: 40 TABLET ORAL at 07:03

## 2017-09-30 RX ADMIN — Medication 10 ML: at 06:58

## 2017-09-30 RX ADMIN — GLIMEPIRIDE 4 MG: 2 TABLET ORAL at 07:03

## 2017-09-30 RX ADMIN — ENOXAPARIN SODIUM 40 MG: 40 INJECTION SUBCUTANEOUS at 08:28

## 2017-09-30 RX ADMIN — GABAPENTIN 100 MG: 100 CAPSULE ORAL at 21:00

## 2017-09-30 RX ADMIN — GABAPENTIN 100 MG: 100 CAPSULE ORAL at 15:02

## 2017-09-30 RX ADMIN — DIPHENHYDRAMINE HYDROCHLORIDE 5 ML: 12.5 SOLUTION ORAL at 16:31

## 2017-09-30 RX ADMIN — OXYCODONE HYDROCHLORIDE AND ACETAMINOPHEN 1 TABLET: 5; 325 TABLET ORAL at 17:52

## 2017-09-30 RX ADMIN — SODIUM CHLORIDE 100 ML/HR: 900 INJECTION, SOLUTION INTRAVENOUS at 22:16

## 2017-09-30 RX ADMIN — CARVEDILOL 12.5 MG: 12.5 TABLET, FILM COATED ORAL at 17:00

## 2017-09-30 RX ADMIN — GABAPENTIN 100 MG: 100 CAPSULE ORAL at 08:27

## 2017-09-30 RX ADMIN — GLIMEPIRIDE 4 MG: 2 TABLET ORAL at 17:00

## 2017-09-30 RX ADMIN — PANTOPRAZOLE SODIUM 40 MG: 40 TABLET, DELAYED RELEASE ORAL at 07:03

## 2017-09-30 RX ADMIN — SODIUM CHLORIDE 100 ML/HR: 900 INJECTION, SOLUTION INTRAVENOUS at 14:52

## 2017-09-30 NOTE — PROGRESS NOTES
Oncology Progress Note     Admit Date: 9/28/2017      Interval History pt is doing ok, tolerating chemo so far, c/o sore in right side of mouth; no cp or  Sob, no diarrhea      Subjective:         Current Facility-Administered Medications   Medication Dose Route Frequency    enoxaparin (LOVENOX) injection 40 mg  40 mg SubCUTAneous DAILY    ondansetron (ZOFRAN) injection 8 mg  8 mg IntraVENous Q6H PRN    oxyCODONE-acetaminophen (PERCOCET) 5-325 mg per tablet 1 Tab  1 Tab Oral Q4H PRN    senna-docusate (PERICOLACE) 8.6-50 mg per tablet 2 Tab  2 Tab Oral Q12H    morphine injection 4 mg  4 mg IntraVENous Q2H PRN    0.9% sodium chloride infusion  100 mL/hr IntraVENous CONTINUOUS    isosorbide mononitrate (ISMO, MONOKET) tablet 20 mg  20 mg Oral BID    gabapentin (NEURONTIN) capsule 100 mg  100 mg Oral TID    simvastatin (ZOCOR) tablet 40 mg  40 mg Oral QHS    carvedilol (COREG) tablet 12.5 mg  12.5 mg Oral BID WITH MEALS    magnesium oxide (MAG-OX) tablet 400 mg  400 mg Oral DAILY    pantoprazole (PROTONIX) tablet 40 mg  40 mg Oral ACB    glimepiride (AMARYL) tablet 4 mg  4 mg Oral BID WITH MEALS    potassium chloride SR (KLOR-CON 10) tablet 20 mEq  20 mEq Oral DAILY    furosemide (LASIX) tablet 40 mg  40 mg Oral BID    sodium chloride (NS) flush 20 mL  20 mL InterCATHeter PRN    sodium chloride (NS) flush 10 mL  10 mL InterCATHeter Q24H    sodium chloride (NS) flush 10 mL  10 mL InterCATHeter PRN    sodium chloride (NS) flush 10 mL  10 mL InterCATHeter Q8H    alteplase (CATHFLO) 1 mg in sterile water (preservative free) 1 mL injection  1 mg InterCATHeter PRN    bacitracin 500 unit/gram packet 1 Packet  1 Packet Topical PRN    fluorouracil (ADRUCIL) 2,050 mg in 0.9% sodium chloride 500 mL, overfill volume 50 mL chemo infusion  2,050 mg IntraVENous Q24H        Review of Systems:  Pertinent items are noted in HPI.     Objective:     Patient Vitals for the past 8 hrs:   BP Temp Pulse Resp SpO2 Weight 17 0833 165/79 97.9 °F (36.6 °C) 70 18 98 % -   17 0657 151/80 - 76 - - -   17 0340 - - - - - 82.7 kg (182 lb 4.8 oz)   17 0333 182/82 98.2 °F (36.8 °C) 86 18 98 % -       Temp (24hrs), Av.1 °F (36.7 °C), Min:97.9 °F (36.6 °C), Max:98.2 °F (36.8 °C)       07 -  1900  In: 183 [I.V.:183]  Out: 1350 [Urine:1350]    Physical Exam:  Alert  Op ulcer  Lungs cta  Heart regular  Abdomen soft    Labs:    Recent Results (from the past 24 hour(s))   IRON PROFILE    Collection Time: 17 11:03 AM   Result Value Ref Range    Iron 110 35 - 150 ug/dL    TIBC 242 (L) 250 - 450 ug/dL    Iron % saturation 45 20 - 50 %   FERRITIN    Collection Time: 17 11:03 AM   Result Value Ref Range    Ferritin 190 26 - 388 NG/ML   RETICULOCYTE COUNT    Collection Time: 17 11:03 AM   Result Value Ref Range    Reticulocyte count 1.1 0.7 - 2.1 %   LD    Collection Time: 17 11:03 AM   Result Value Ref Range     85 - 241 U/L   HAPTOGLOBIN    Collection Time: 17 11:03 AM   Result Value Ref Range    Haptoglobin 337 (H) 30 - 200 mg/dL   GLUCOSE, POC    Collection Time: 17 11:18 AM   Result Value Ref Range    Glucose (POC) 250 (H) 65 - 100 mg/dL    Performed by [facility](CON)    OCCULT BLOOD, STOOL    Collection Time: 17 12:54 PM   Result Value Ref Range    Occult blood, stool NEGATIVE  NEG     GLUCOSE, POC    Collection Time: 17  4:36 PM   Result Value Ref Range    Glucose (POC) 160 (H) 65 - 100 mg/dL    Performed by [address] [place] [address] St, POC    Collection Time: 17  9:36 PM   Result Value Ref Range    Glucose (POC) 147 (H) 65 - 100 mg/dL    Performed by Vj Chi    FOLATE    Collection Time: 17  3:43 AM   Result Value Ref Range    Folate 13.8 5.0 - 21.0 ng/mL   CBC W/O DIFF    Collection Time: 17  3:43 AM   Result Value Ref Range    WBC 6.8 4.1 - 11.1 K/uL    RBC 3.53 (L) 4.10 - 5.70 M/uL    HGB 10.7 (L) 12.1 - 17.0 g/dL    HCT 32.3 (L) 36.6 - 50.3 %    MCV 91.5 80.0 - 99.0 FL    MCH 30.3 26.0 - 34.0 PG    MCHC 33.1 30.0 - 36.5 g/dL    RDW 13.1 11.5 - 14.5 %    PLATELET 711 851 - 870 K/uL   METABOLIC PANEL, COMPREHENSIVE    Collection Time: 09/30/17  3:43 AM   Result Value Ref Range    Sodium 139 136 - 145 mmol/L    Potassium 3.9 3.5 - 5.1 mmol/L    Chloride 108 97 - 108 mmol/L    CO2 23 21 - 32 mmol/L    Anion gap 8 5 - 15 mmol/L    Glucose 120 (H) 65 - 100 mg/dL    BUN 24 (H) 6 - 20 MG/DL    Creatinine 1.33 (H) 0.70 - 1.30 MG/DL    BUN/Creatinine ratio 18 12 - 20      GFR est AA >60 >60 ml/min/1.73m2    GFR est non-AA 52 (L) >60 ml/min/1.73m2    Calcium 8.4 (L) 8.5 - 10.1 MG/DL    Bilirubin, total 0.2 0.2 - 1.0 MG/DL    ALT (SGPT) 23 12 - 78 U/L    AST (SGOT) 21 15 - 37 U/L    Alk.  phosphatase 88 45 - 117 U/L    Protein, total 7.0 6.4 - 8.2 g/dL    Albumin 2.6 (L) 3.5 - 5.0 g/dL    Globulin 4.4 (H) 2.0 - 4.0 g/dL    A-G Ratio 0.6 (L) 1.1 - 2.2     GLUCOSE, POC    Collection Time: 09/30/17  6:55 AM   Result Value Ref Range    Glucose (POC) 121 (H) 65 - 100 mg/dL    Performed by Tawanna Gibson        Assessment:     A/P:  H&N cancer undergoing induction TPF; C1 tolerating treatment well so far  Mucositis add magic mouthwash  Anemia hgb better , no hemolysis iron, Fa def;   Sheree improved , IVF;    dvt prophylaxis lovenox

## 2017-10-01 LAB
ALBUMIN SERPL-MCNC: 2.7 G/DL (ref 3.5–5)
ALBUMIN/GLOB SERPL: 0.6 {RATIO} (ref 1.1–2.2)
ALP SERPL-CCNC: 83 U/L (ref 45–117)
ALT SERPL-CCNC: 30 U/L (ref 12–78)
ANION GAP SERPL CALC-SCNC: 10 MMOL/L (ref 5–15)
AST SERPL-CCNC: 29 U/L (ref 15–37)
BILIRUB SERPL-MCNC: 0.4 MG/DL (ref 0.2–1)
BUN SERPL-MCNC: 19 MG/DL (ref 6–20)
BUN/CREAT SERPL: 16 (ref 12–20)
CALCIUM SERPL-MCNC: 8.2 MG/DL (ref 8.5–10.1)
CHLORIDE SERPL-SCNC: 107 MMOL/L (ref 97–108)
CO2 SERPL-SCNC: 23 MMOL/L (ref 21–32)
CREAT SERPL-MCNC: 1.21 MG/DL (ref 0.7–1.3)
ERYTHROCYTE [DISTWIDTH] IN BLOOD BY AUTOMATED COUNT: 13 % (ref 11.5–14.5)
GLOBULIN SER CALC-MCNC: 4.3 G/DL (ref 2–4)
GLUCOSE BLD STRIP.AUTO-MCNC: 150 MG/DL (ref 65–100)
GLUCOSE BLD STRIP.AUTO-MCNC: 151 MG/DL (ref 65–100)
GLUCOSE BLD STRIP.AUTO-MCNC: 174 MG/DL (ref 65–100)
GLUCOSE BLD STRIP.AUTO-MCNC: 177 MG/DL (ref 65–100)
GLUCOSE SERPL-MCNC: 129 MG/DL (ref 65–100)
HCT VFR BLD AUTO: 33.4 % (ref 36.6–50.3)
HGB BLD-MCNC: 11.1 G/DL (ref 12.1–17)
MAGNESIUM SERPL-MCNC: 1.1 MG/DL (ref 1.6–2.4)
MCH RBC QN AUTO: 30.3 PG (ref 26–34)
MCHC RBC AUTO-ENTMCNC: 33.2 G/DL (ref 30–36.5)
MCV RBC AUTO: 91.3 FL (ref 80–99)
PLATELET # BLD AUTO: 182 K/UL (ref 150–400)
POTASSIUM SERPL-SCNC: 3.6 MMOL/L (ref 3.5–5.1)
PROT SERPL-MCNC: 7 G/DL (ref 6.4–8.2)
RBC # BLD AUTO: 3.66 M/UL (ref 4.1–5.7)
SERVICE CMNT-IMP: ABNORMAL
SODIUM SERPL-SCNC: 140 MMOL/L (ref 136–145)
WBC # BLD AUTO: 5.7 K/UL (ref 4.1–11.1)

## 2017-10-01 PROCEDURE — 74011250636 HC RX REV CODE- 250/636: Performed by: INTERNAL MEDICINE

## 2017-10-01 PROCEDURE — 83735 ASSAY OF MAGNESIUM: CPT | Performed by: INTERNAL MEDICINE

## 2017-10-01 PROCEDURE — 65270000029 HC RM PRIVATE

## 2017-10-01 PROCEDURE — 80053 COMPREHEN METABOLIC PANEL: CPT | Performed by: INTERNAL MEDICINE

## 2017-10-01 PROCEDURE — 85027 COMPLETE CBC AUTOMATED: CPT | Performed by: INTERNAL MEDICINE

## 2017-10-01 PROCEDURE — 82962 GLUCOSE BLOOD TEST: CPT

## 2017-10-01 PROCEDURE — 74011250637 HC RX REV CODE- 250/637: Performed by: INTERNAL MEDICINE

## 2017-10-01 PROCEDURE — 36415 COLL VENOUS BLD VENIPUNCTURE: CPT | Performed by: INTERNAL MEDICINE

## 2017-10-01 RX ORDER — MAGNESIUM SULFATE 1 G/100ML
1 INJECTION INTRAVENOUS ONCE
Status: COMPLETED | OUTPATIENT
Start: 2017-10-01 | End: 2017-10-01

## 2017-10-01 RX ADMIN — GLIMEPIRIDE 4 MG: 2 TABLET ORAL at 16:17

## 2017-10-01 RX ADMIN — Medication 400 MG: at 08:30

## 2017-10-01 RX ADMIN — Medication 10 ML: at 04:10

## 2017-10-01 RX ADMIN — OXYCODONE HYDROCHLORIDE AND ACETAMINOPHEN 1 TABLET: 5; 325 TABLET ORAL at 08:35

## 2017-10-01 RX ADMIN — GLIMEPIRIDE 4 MG: 2 TABLET ORAL at 07:13

## 2017-10-01 RX ADMIN — Medication 10 ML: at 12:05

## 2017-10-01 RX ADMIN — POTASSIUM CHLORIDE 20 MEQ: 750 TABLET, FILM COATED, EXTENDED RELEASE ORAL at 08:31

## 2017-10-01 RX ADMIN — DIPHENHYDRAMINE HYDROCHLORIDE 5 ML: 12.5 SOLUTION ORAL at 16:00

## 2017-10-01 RX ADMIN — OXYCODONE HYDROCHLORIDE AND ACETAMINOPHEN 1 TABLET: 5; 325 TABLET ORAL at 04:33

## 2017-10-01 RX ADMIN — PANTOPRAZOLE SODIUM 40 MG: 40 TABLET, DELAYED RELEASE ORAL at 07:14

## 2017-10-01 RX ADMIN — GABAPENTIN 100 MG: 100 CAPSULE ORAL at 22:00

## 2017-10-01 RX ADMIN — SIMVASTATIN 40 MG: 20 TABLET, FILM COATED ORAL at 22:00

## 2017-10-01 RX ADMIN — FUROSEMIDE 40 MG: 40 TABLET ORAL at 11:00

## 2017-10-01 RX ADMIN — CARVEDILOL 12.5 MG: 12.5 TABLET, FILM COATED ORAL at 07:14

## 2017-10-01 RX ADMIN — Medication 10 ML: at 11:00

## 2017-10-01 RX ADMIN — FUROSEMIDE 40 MG: 40 TABLET ORAL at 07:13

## 2017-10-01 RX ADMIN — FLUOROURACIL 2050 MG: 50 INJECTION, SOLUTION INTRAVENOUS at 18:40

## 2017-10-01 RX ADMIN — SODIUM CHLORIDE 100 ML/HR: 900 INJECTION, SOLUTION INTRAVENOUS at 18:36

## 2017-10-01 RX ADMIN — MAGNESIUM SULFATE HEPTAHYDRATE 1 G: 1 INJECTION, SOLUTION INTRAVENOUS at 11:00

## 2017-10-01 RX ADMIN — GABAPENTIN 100 MG: 100 CAPSULE ORAL at 16:00

## 2017-10-01 RX ADMIN — DIPHENHYDRAMINE HYDROCHLORIDE 5 ML: 12.5 SOLUTION ORAL at 10:59

## 2017-10-01 RX ADMIN — GABAPENTIN 100 MG: 100 CAPSULE ORAL at 08:31

## 2017-10-01 RX ADMIN — ENOXAPARIN SODIUM 40 MG: 40 INJECTION SUBCUTANEOUS at 08:31

## 2017-10-01 RX ADMIN — DOCUSATE SODIUM AND SENNOSIDES 2 TABLET: 8.6; 5 TABLET, FILM COATED ORAL at 08:31

## 2017-10-01 RX ADMIN — DIPHENHYDRAMINE HYDROCHLORIDE 5 ML: 12.5 SOLUTION ORAL at 07:14

## 2017-10-01 RX ADMIN — OXYCODONE HYDROCHLORIDE AND ACETAMINOPHEN 1 TABLET: 5; 325 TABLET ORAL at 16:17

## 2017-10-01 RX ADMIN — Medication 10 ML: at 21:01

## 2017-10-01 RX ADMIN — ISOSORBIDE MONONITRATE 20 MG: 20 TABLET ORAL at 08:31

## 2017-10-01 RX ADMIN — CARVEDILOL 12.5 MG: 12.5 TABLET, FILM COATED ORAL at 16:17

## 2017-10-01 RX ADMIN — GABAPENTIN 100 MG: 100 CAPSULE ORAL at 20:54

## 2017-10-01 RX ADMIN — SIMVASTATIN 40 MG: 20 TABLET, FILM COATED ORAL at 21:00

## 2017-10-01 RX ADMIN — Medication 20 ML: at 04:10

## 2017-10-01 RX ADMIN — ISOSORBIDE MONONITRATE 20 MG: 20 TABLET ORAL at 17:20

## 2017-10-01 NOTE — PROGRESS NOTES
Oncology Progress Note     Admit Date: 9/28/2017      Interval History pt is doing ok, tolerating chemo so far, c/o sore in right side of mouth but magic mouthwash helps;  no cp or  Sob, no diarrhea      Subjective:         Current Facility-Administered Medications   Medication Dose Route Frequency    magic mouthwash cpd (without sucralfate)  5 mL Oral TIDAC    enoxaparin (LOVENOX) injection 40 mg  40 mg SubCUTAneous DAILY    ondansetron (ZOFRAN) injection 8 mg  8 mg IntraVENous Q6H PRN    oxyCODONE-acetaminophen (PERCOCET) 5-325 mg per tablet 1 Tab  1 Tab Oral Q4H PRN    senna-docusate (PERICOLACE) 8.6-50 mg per tablet 2 Tab  2 Tab Oral Q12H    morphine injection 4 mg  4 mg IntraVENous Q2H PRN    0.9% sodium chloride infusion  100 mL/hr IntraVENous CONTINUOUS    isosorbide mononitrate (ISMO, MONOKET) tablet 20 mg  20 mg Oral BID    gabapentin (NEURONTIN) capsule 100 mg  100 mg Oral TID    simvastatin (ZOCOR) tablet 40 mg  40 mg Oral QHS    carvedilol (COREG) tablet 12.5 mg  12.5 mg Oral BID WITH MEALS    magnesium oxide (MAG-OX) tablet 400 mg  400 mg Oral DAILY    pantoprazole (PROTONIX) tablet 40 mg  40 mg Oral ACB    glimepiride (AMARYL) tablet 4 mg  4 mg Oral BID WITH MEALS    potassium chloride SR (KLOR-CON 10) tablet 20 mEq  20 mEq Oral DAILY    furosemide (LASIX) tablet 40 mg  40 mg Oral BID    sodium chloride (NS) flush 20 mL  20 mL InterCATHeter PRN    sodium chloride (NS) flush 10 mL  10 mL InterCATHeter Q24H    sodium chloride (NS) flush 10 mL  10 mL InterCATHeter PRN    sodium chloride (NS) flush 10 mL  10 mL InterCATHeter Q8H    alteplase (CATHFLO) 1 mg in sterile water (preservative free) 1 mL injection  1 mg InterCATHeter PRN    bacitracin 500 unit/gram packet 1 Packet  1 Packet Topical PRN    fluorouracil (ADRUCIL) 2,050 mg in 0.9% sodium chloride 500 mL, overfill volume 50 mL chemo infusion  2,050 mg IntraVENous Q24H        Review of Systems:  Pertinent items are noted in HPI.    Objective:     Patient Vitals for the past 8 hrs:   BP Temp Pulse Resp SpO2 Weight   10/01/17 0831 160/84 98.9 °F (37.2 °C) 70 18 97 % -   10/01/17 0713 155/78 - 77 - - -   10/01/17 0438 - - - - - 82.7 kg (182 lb 6.4 oz)       Temp (24hrs), Av.5 °F (36.9 °C), Min:98.2 °F (36.8 °C), Max:98.9 °F (37.2 °C)      10/01 0701 - 10/01 190  In: 5590 [I.V.:1103]  Out: -     Physical Exam:  Alert  Op ulcer  Lungs cta  Heart regular  Abdomen soft    Labs:    Recent Results (from the past 24 hour(s))   GLUCOSE, POC    Collection Time: 17 11:26 AM   Result Value Ref Range    Glucose (POC) 186 (H) 65 - 100 mg/dL    Performed by Mayte Bowman, POC    Collection Time: 17  4:22 PM   Result Value Ref Range    Glucose (POC) 191 (H) 65 - 100 mg/dL    Performed by Glory Gonzales    CBC W/O DIFF    Collection Time: 10/01/17  4:12 AM   Result Value Ref Range    WBC 5.7 4.1 - 11.1 K/uL    RBC 3.66 (L) 4.10 - 5.70 M/uL    HGB 11.1 (L) 12.1 - 17.0 g/dL    HCT 33.4 (L) 36.6 - 50.3 %    MCV 91.3 80.0 - 99.0 FL    MCH 30.3 26.0 - 34.0 PG    MCHC 33.2 30.0 - 36.5 g/dL    RDW 13.0 11.5 - 14.5 %    PLATELET 412 633 - 553 K/uL   METABOLIC PANEL, COMPREHENSIVE    Collection Time: 10/01/17  4:12 AM   Result Value Ref Range    Sodium 140 136 - 145 mmol/L    Potassium 3.6 3.5 - 5.1 mmol/L    Chloride 107 97 - 108 mmol/L    CO2 23 21 - 32 mmol/L    Anion gap 10 5 - 15 mmol/L    Glucose 129 (H) 65 - 100 mg/dL    BUN 19 6 - 20 MG/DL    Creatinine 1.21 0.70 - 1.30 MG/DL    BUN/Creatinine ratio 16 12 - 20      GFR est AA >60 >60 ml/min/1.73m2    GFR est non-AA 58 (L) >60 ml/min/1.73m2    Calcium 8.2 (L) 8.5 - 10.1 MG/DL    Bilirubin, total 0.4 0.2 - 1.0 MG/DL    ALT (SGPT) 30 12 - 78 U/L    AST (SGOT) 29 15 - 37 U/L    Alk.  phosphatase 83 45 - 117 U/L    Protein, total 7.0 6.4 - 8.2 g/dL    Albumin 2.7 (L) 3.5 - 5.0 g/dL    Globulin 4.3 (H) 2.0 - 4.0 g/dL    A-G Ratio 0.6 (L) 1.1 - 2.2     MAGNESIUM    Collection Time: 10/01/17  4:12 AM   Result Value Ref Range    Magnesium 1.1 (L) 1.6 - 2.4 mg/dL   GLUCOSE, POC    Collection Time: 10/01/17  7:12 AM   Result Value Ref Range    Glucose (POC) 150 (H) 65 - 100 mg/dL    Performed by Gonzalez Banda        Assessment:     A/P:  H&N cancer undergoing induction TPF; C1 tolerating treatment well so far  Mucositis on  magic mouthwash  Anemia hgb better , no hemolysis iron, Fa def; hgb up to 11.1  Sheree improved , IVF;    dvt prophylaxis lovenox  Hypomagnesemia will replete

## 2017-10-01 NOTE — PROGRESS NOTES
Documented on 10/1/17:    Spiritual Care Partner Volunteer visited patient in [address] on 9/30/17. Documented by:  Latanya Diaz M.Div.    Paging Service [phone])

## 2017-10-02 LAB
ALBUMIN SERPL ELPH-MCNC: 3.1 G/DL (ref 2.9–4.4)
ALBUMIN SERPL-MCNC: 2.6 G/DL (ref 3.5–5)
ALBUMIN/GLOB SERPL: 0.6 {RATIO} (ref 1.1–2.2)
ALBUMIN/GLOB SERPL: 1 {RATIO} (ref 0.7–1.7)
ALP SERPL-CCNC: 78 U/L (ref 45–117)
ALPHA1 GLOB SERPL ELPH-MCNC: 0.3 G/DL (ref 0–0.4)
ALPHA2 GLOB SERPL ELPH-MCNC: 0.8 G/DL (ref 0.4–1)
ALT SERPL-CCNC: 34 U/L (ref 12–78)
ANION GAP SERPL CALC-SCNC: 11 MMOL/L (ref 5–15)
AST SERPL-CCNC: 31 U/L (ref 15–37)
B-GLOBULIN SERPL ELPH-MCNC: 1.1 G/DL (ref 0.7–1.3)
BILIRUB SERPL-MCNC: 0.6 MG/DL (ref 0.2–1)
BUN SERPL-MCNC: 19 MG/DL (ref 6–20)
BUN/CREAT SERPL: 15 (ref 12–20)
CALCIUM SERPL-MCNC: 8.2 MG/DL (ref 8.5–10.1)
CHLORIDE SERPL-SCNC: 106 MMOL/L (ref 97–108)
CO2 SERPL-SCNC: 23 MMOL/L (ref 21–32)
CREAT SERPL-MCNC: 1.23 MG/DL (ref 0.7–1.3)
ERYTHROCYTE [DISTWIDTH] IN BLOOD BY AUTOMATED COUNT: 12.5 % (ref 11.5–14.5)
GAMMA GLOB SERPL ELPH-MCNC: 1 G/DL (ref 0.4–1.8)
GLOBULIN SER CALC-MCNC: 3.1 G/DL (ref 2.2–3.9)
GLOBULIN SER CALC-MCNC: 4.2 G/DL (ref 2–4)
GLUCOSE BLD STRIP.AUTO-MCNC: 149 MG/DL (ref 65–100)
GLUCOSE BLD STRIP.AUTO-MCNC: 172 MG/DL (ref 65–100)
GLUCOSE BLD STRIP.AUTO-MCNC: 193 MG/DL (ref 65–100)
GLUCOSE BLD STRIP.AUTO-MCNC: 250 MG/DL (ref 65–100)
GLUCOSE SERPL-MCNC: 143 MG/DL (ref 65–100)
HCT VFR BLD AUTO: 32.9 % (ref 36.6–50.3)
HGB BLD-MCNC: 10.9 G/DL (ref 12.1–17)
M PROTEIN SERPL ELPH-MCNC: NORMAL G/DL
MAGNESIUM SERPL-MCNC: 1.2 MG/DL (ref 1.6–2.4)
MCH RBC QN AUTO: 30.4 PG (ref 26–34)
MCHC RBC AUTO-ENTMCNC: 33.1 G/DL (ref 30–36.5)
MCV RBC AUTO: 91.9 FL (ref 80–99)
PLATELET # BLD AUTO: 161 K/UL (ref 150–400)
POTASSIUM SERPL-SCNC: 3.6 MMOL/L (ref 3.5–5.1)
PROT SERPL-MCNC: 6.2 G/DL (ref 6–8.5)
PROT SERPL-MCNC: 6.8 G/DL (ref 6.4–8.2)
RBC # BLD AUTO: 3.58 M/UL (ref 4.1–5.7)
SERVICE CMNT-IMP: ABNORMAL
SODIUM SERPL-SCNC: 140 MMOL/L (ref 136–145)
WBC # BLD AUTO: 6.5 K/UL (ref 4.1–11.1)

## 2017-10-02 PROCEDURE — 74011250636 HC RX REV CODE- 250/636: Performed by: INTERNAL MEDICINE

## 2017-10-02 PROCEDURE — 85027 COMPLETE CBC AUTOMATED: CPT | Performed by: INTERNAL MEDICINE

## 2017-10-02 PROCEDURE — 83735 ASSAY OF MAGNESIUM: CPT | Performed by: INTERNAL MEDICINE

## 2017-10-02 PROCEDURE — 36415 COLL VENOUS BLD VENIPUNCTURE: CPT | Performed by: INTERNAL MEDICINE

## 2017-10-02 PROCEDURE — 74011250637 HC RX REV CODE- 250/637: Performed by: INTERNAL MEDICINE

## 2017-10-02 PROCEDURE — 82962 GLUCOSE BLOOD TEST: CPT

## 2017-10-02 PROCEDURE — 80053 COMPREHEN METABOLIC PANEL: CPT | Performed by: INTERNAL MEDICINE

## 2017-10-02 PROCEDURE — 65270000029 HC RM PRIVATE

## 2017-10-02 RX ADMIN — SODIUM CHLORIDE 100 ML/HR: 900 INJECTION, SOLUTION INTRAVENOUS at 23:35

## 2017-10-02 RX ADMIN — GLIMEPIRIDE 4 MG: 2 TABLET ORAL at 17:47

## 2017-10-02 RX ADMIN — PANTOPRAZOLE SODIUM 40 MG: 40 TABLET, DELAYED RELEASE ORAL at 07:10

## 2017-10-02 RX ADMIN — Medication 10 ML: at 05:04

## 2017-10-02 RX ADMIN — OXYCODONE HYDROCHLORIDE AND ACETAMINOPHEN 1 TABLET: 5; 325 TABLET ORAL at 07:57

## 2017-10-02 RX ADMIN — CARVEDILOL 12.5 MG: 12.5 TABLET, FILM COATED ORAL at 17:47

## 2017-10-02 RX ADMIN — GABAPENTIN 100 MG: 100 CAPSULE ORAL at 09:43

## 2017-10-02 RX ADMIN — FUROSEMIDE 40 MG: 40 TABLET ORAL at 07:10

## 2017-10-02 RX ADMIN — POTASSIUM CHLORIDE 20 MEQ: 750 TABLET, FILM COATED, EXTENDED RELEASE ORAL at 09:44

## 2017-10-02 RX ADMIN — Medication 10 ML: at 23:35

## 2017-10-02 RX ADMIN — GABAPENTIN 100 MG: 100 CAPSULE ORAL at 21:34

## 2017-10-02 RX ADMIN — Medication 10 ML: at 14:31

## 2017-10-02 RX ADMIN — ISOSORBIDE MONONITRATE 20 MG: 20 TABLET ORAL at 19:33

## 2017-10-02 RX ADMIN — DOCUSATE SODIUM AND SENNOSIDES 2 TABLET: 8.6; 5 TABLET, FILM COATED ORAL at 09:44

## 2017-10-02 RX ADMIN — DIPHENHYDRAMINE HYDROCHLORIDE 5 ML: 12.5 SOLUTION ORAL at 11:40

## 2017-10-02 RX ADMIN — Medication 400 MG: at 09:43

## 2017-10-02 RX ADMIN — DIPHENHYDRAMINE HYDROCHLORIDE 5 ML: 12.5 SOLUTION ORAL at 07:11

## 2017-10-02 RX ADMIN — SODIUM CHLORIDE 100 ML/HR: 900 INJECTION, SOLUTION INTRAVENOUS at 04:54

## 2017-10-02 RX ADMIN — ISOSORBIDE MONONITRATE 20 MG: 20 TABLET ORAL at 09:46

## 2017-10-02 RX ADMIN — SODIUM CHLORIDE 100 ML/HR: 900 INJECTION, SOLUTION INTRAVENOUS at 14:31

## 2017-10-02 RX ADMIN — SIMVASTATIN 40 MG: 20 TABLET, FILM COATED ORAL at 21:34

## 2017-10-02 RX ADMIN — Medication 10 ML: at 09:48

## 2017-10-02 RX ADMIN — Medication 10 ML: at 01:10

## 2017-10-02 RX ADMIN — ENOXAPARIN SODIUM 40 MG: 40 INJECTION SUBCUTANEOUS at 09:45

## 2017-10-02 RX ADMIN — FUROSEMIDE 40 MG: 40 TABLET ORAL at 11:40

## 2017-10-02 RX ADMIN — Medication 20 ML: at 01:09

## 2017-10-02 RX ADMIN — DIPHENHYDRAMINE HYDROCHLORIDE 5 ML: 12.5 SOLUTION ORAL at 17:47

## 2017-10-02 RX ADMIN — GABAPENTIN 100 MG: 100 CAPSULE ORAL at 16:06

## 2017-10-02 RX ADMIN — GLIMEPIRIDE 4 MG: 2 TABLET ORAL at 07:09

## 2017-10-02 RX ADMIN — OXYCODONE HYDROCHLORIDE AND ACETAMINOPHEN 1 TABLET: 5; 325 TABLET ORAL at 01:18

## 2017-10-02 RX ADMIN — CARVEDILOL 12.5 MG: 12.5 TABLET, FILM COATED ORAL at 07:10

## 2017-10-02 NOTE — PROGRESS NOTES
Problem: Falls - Risk of  Goal: *Absence of Falls  Document Mik Fall Risk and appropriate interventions in the flowsheet.    Outcome: Progressing Towards Goal  Fall Risk Interventions:  Mobility Interventions: Patient to call before getting OOB           Medication Interventions: Patient to call before getting OOB     Elimination Interventions: Call light in reach     History of Falls Interventions: Door open when patient unattended

## 2017-10-02 NOTE — PROGRESS NOTES
Bedside shift change report given to sis (oncoming nurse) by torie (offgoing nurse). Report included the following information SBAR, Kardex, MAR and Recent Results.

## 2017-10-02 NOTE — PROGRESS NOTES
10:59 AM  Patient reviewed in rounds. CM informed that Patient present at hospital for first round of chemotherapy for Head and Neck cancer. RN reported that today is patient's last day of chemo treatment. CM will continue to follow and assist with disposition needs as they arise.     KOSTAS Valdovinos/KAYDEN

## 2017-10-02 NOTE — PROGRESS NOTES
Bedside shift change report given to Timoteo Blanco (oncoming nurse) by Vikram Waggoner (offgoing nurse). Report included the following information SBAR.

## 2017-10-02 NOTE — PROGRESS NOTES
NUTRITION COMPLETE ASSESSMENT    RECOMMENDATIONS:   1.  RD to add Ensure Enlive 4x daily    2. Consider PEG placement if patient is agreeable to provide supplemental feedings and may soon be needed to provide most nutrition     3. Check wt daily      Interventions/Plan:   Food/Nutrient Delivery:    Commercial supplement (Ensure Enlive 4x daily )       Ensure Enlive provides: 1400 cals/d, 80g protein if 100% consumed    Nutrition Education:     Coordination of Care:   ORLY RN, PO intake bites only <25% meals x 3days    Nutrition Counseling:        Assessment:   Reason for Assessment:   [] Provider Consult  []BPA/MST Referral   []LOS   []Reassessment   []NPO/Clear Liquid   [x]At Nutrition Risk  []Other    Diet: Mechanical soft  Supplements: Ensure TID at home  Nutritionally Significant Medications: [x] Reviewed & Includes:  Magic mouthwash, zocor, protonix, amaryl, lasix    Meal Intake: Patient Vitals for the past 100 hrs:   % Diet Eaten   10/01/17 1840 0 %   10/01/17 1318 15 %   10/01/17 0831 5 %   09/30/17 1215 25 %   09/30/17 0833 25 %   09/29/17 1826 75 %   09/29/17 1250 75 %   09/29/17 0858 50 %   09/28/17 1506 50 %       Subjective:  Pt answered questions posed  \"I'm not hungry, Food doesn't have any taste\"    Objective:  Pt admitted to begin chemo treatment for head/neck cancer. Intake was ok first 2 days however, declining since weekend and now very poor. Pt reports change in tastes, agrees to begin Ensure & reports he was drinking 2-3x day PTA. Pt has high needs as assessed below with head/neck Cancer, poor denture and has not had all dental work needed to begin complete treatment plan per ONC notes. No previous weights available and pt reports no significant wt loss. Pt doesn't seem too concerned with appetite. With low PO and current ONS ordered pt would at best meet 50% of estimated calorie needs unless he increases PO.       Estimated Nutrition Needs:   Kcals/day: 8448 Kcals/day (35 kcal/kg of current wt)  Protein: 122 g (1.5 g/kg of current wt)  Fluid: 2840 ml (1 mL/kcal of estimated needs)     Based On: Kcal/kg - specify (Comment) (35 kcal/kg )  Weight Used: Actual wt (81.2 kg)    Pt expected to meet estimated nutrient needs:    []   Yes     []  No       [x] Unable to predict at this time      Nutrition Diagnosis:   1. Inadequate oral intake related to altered taste as evidenced by pt reports foods have no taste, oral cancer, started chemo, early satiety, high energy needs from head/neck Ca     Goals:     pt to tolerate 100% ONS and 75% of meals     Monitoring & Evaluation:    - Total energy intake   - Nutritional anemia profile   -      Previous Nutrition Goals Met:  N/A    Previous Recommendations:      N/A    Education & Discharge Needs:   [] None Identified   [x] Identified and addressed: high energy needs    [x] Participated in care plan, discharge planning, and/or interdisciplinary rounds        Cultural, Quaker and ethnic food preferences identified:   None    Skin Integrity: [x]Intact  []Other  Edema: [x]None [x]Other  Last BM: 10/1, soft, brown  ABD: WDL  Food Allergies: [x]None []Other    Anthropometrics:    Weight Loss Metrics 10/2/2017 9/28/2017   Today's Wt 179 lb -   BMI - 26.43 kg/m2      Last 3 Recorded Weights in this Encounter    09/30/17 0340 10/01/17 0438 10/02/17 0503   Weight: 82.7 kg (182 lb 4.8 oz) 82.7 kg (182 lb 6.4 oz) 81.2 kg (179 lb)      Weight Source: Standing scale (comment)  Height: 5' 9\" (175.3 cm),    Body mass index is 26.43 kg/(m^2).   IBW : 72.6 kg (160 lb),     ,      Labs:  Lab Results   Component Value Date/Time    Sodium 140 10/02/2017 01:11 AM    Potassium 3.6 10/02/2017 01:11 AM    Chloride 106 10/02/2017 01:11 AM    CO2 23 10/02/2017 01:11 AM    Glucose 143 10/02/2017 01:11 AM    BUN 19 10/02/2017 01:11 AM    Creatinine 1.23 10/02/2017 01:11 AM    Calcium 8.2 10/02/2017 01:11 AM    Magnesium 1.2 10/02/2017 01:11 AM    Albumin 2.6 10/02/2017 01:11 AM     No results found for: HBA1C, HGBE8, YHV6QCMT, BOL1CKRL  Lab Results   Component Value Date/Time    Glucose 143 10/02/2017 01:11 AM    Glucose (POC) 172 10/02/2017 11:43 AM      Lab Results   Component Value Date/Time    ALT (SGPT) 34 10/02/2017 01:11 AM    AST (SGOT) 31 10/02/2017 01:11 AM    Alk.  phosphatase 78 10/02/2017 01:11 AM    Bilirubin, total 0.6 10/02/2017 01:11 AM        Stiven Jolley RD, MS, CDE

## 2017-10-02 NOTE — PROGRESS NOTES
Hematology-Oncology Progress Note    Dolores Daugherty  1940  [id number]  10/2/2017    Follow-up for: locally adv. H&N cancer                           chemotherapy     [x]        Chart notes since last visit reviewed   [x]        Medications reviewed for allergies and interactions       Case discussed with the following:         []                            [x]        Nursing Staff                                                                         []        Pathologist                                                                        []        FAMILY      Subjective:     Spoke with patient who complains of: doing well with chemo this weekend, has a little discomfort in R neck     Objective:     Patient Vitals for the past 24 hrs:   BP Temp Pulse Resp SpO2 Weight   10/02/17 0940 141/65 98.3 °F (36.8 °C) 74 16 97 % -   10/02/17 0503 - - - - - 81.2 kg (179 lb)   10/02/17 0108 152/77 99.7 °F (37.6 °C) 80 18 97 % -   10/01/17 2051 147/83 98.4 °F (36.9 °C) 76 16 98 % -   10/01/17 1500 156/85 98.5 °F (36.9 °C) 87 18 98 % -       REVIEW OF SYSTEMS:    Constitutional: negative fever, negative chills, negative weight loss  Eyes:   negative visual changes  ENT:   negative sore throat, tongue or lip swelling  Respiratory:  negative cough, negative dyspnea  Cards:  negative for chest pain, palpitations, lower extremity edema  GI:   negative for nausea, vomiting, diarrhea, and abdominal pain  Neuro:  negative for headaches, dizziness, vertigo  [x]                        Full ROS o/w normal/non contributor    Constitutional:  Patient looks  []        Sick  []        Frail  [x]        Better                                                 []        Depressed    HEENT:  [x]   NC                         []   AT               []    ALOPECIA           Eyes: [x]   Normal               []    Icteric  Oropharynx: poor dentition  Mucositis: [x]    None                 Grade: []        I  []        II  []        III  [] IV  Neck:   [x]   Supple                  []  Rigid               JVD:    [x]   ABSENT       []   PRESENT  Lymphadenopathy:   []   None Noted            [x]   PRESENT  5x5 cm fixed R neck mass    Chest:  [x]   Clear               []    Rhonchi                      Dec'd @     []  Right Base           []   Left Base    CV:             [x]   Regular              []  Irregular               []   Tachy                []   Murmur  Abdominal:   [x]    Soft              []   NON-tender               []   Tender      BS:    []   ABSENT                   []   PRESENT  Liver:     [x]  NON-palp                  []   EDGE- palp  Spleen: [x]   NON-palp                   []  EDGE - palp  Mass:   [x]   ABSENT                          []  PRESENT  Extr:    [x]  Lymphedema             []   Cyanosis      []  Clubbing  Edema:     [x]   NONE       []   PRESENT  Skin:  Intact [x]           Purpura []        Rash: [x]   ABSENT       []  PRESENT  Neuro:  [x]        Normal  []        Confused      Available labs reviewed:  Labs:    Recent Results (from the past 24 hour(s))   GLUCOSE, POC    Collection Time: 10/01/17 11:17 AM   Result Value Ref Range    Glucose (POC) 177 (H) 65 - 100 mg/dL    Performed by Fidencio Koyanagi    GLUCOSE, POC    Collection Time: 10/01/17  4:37 PM   Result Value Ref Range    Glucose (POC) 174 (H) 65 - 100 mg/dL    Performed by Fernandez Paulino(MICHELLE)    GLUCOSE, POC    Collection Time: 10/01/17  9:31 PM   Result Value Ref Range    Glucose (POC) 151 (H) 65 - 100 mg/dL    Performed by Ana Luisa Pugh    CBC W/O DIFF    Collection Time: 10/02/17  1:11 AM   Result Value Ref Range    WBC 6.5 4.1 - 11.1 K/uL    RBC 3.58 (L) 4.10 - 5.70 M/uL    HGB 10.9 (L) 12.1 - 17.0 g/dL    HCT 32.9 (L) 36.6 - 50.3 %    MCV 91.9 80.0 - 99.0 FL    MCH 30.4 26.0 - 34.0 PG    MCHC 33.1 30.0 - 36.5 g/dL    RDW 12.5 11.5 - 14.5 %    PLATELET 992 182 - 200 K/uL   METABOLIC PANEL, COMPREHENSIVE    Collection Time: 10/02/17  1:11 AM Result Value Ref Range    Sodium 140 136 - 145 mmol/L    Potassium 3.6 3.5 - 5.1 mmol/L    Chloride 106 97 - 108 mmol/L    CO2 23 21 - 32 mmol/L    Anion gap 11 5 - 15 mmol/L    Glucose 143 (H) 65 - 100 mg/dL    BUN 19 6 - 20 MG/DL    Creatinine 1.23 0.70 - 1.30 MG/DL    BUN/Creatinine ratio 15 12 - 20      GFR est AA >60 >60 ml/min/1.73m2    GFR est non-AA 57 (L) >60 ml/min/1.73m2    Calcium 8.2 (L) 8.5 - 10.1 MG/DL    Bilirubin, total 0.6 0.2 - 1.0 MG/DL    ALT (SGPT) 34 12 - 78 U/L    AST (SGOT) 31 15 - 37 U/L    Alk. phosphatase 78 45 - 117 U/L    Protein, total 6.8 6.4 - 8.2 g/dL    Albumin 2.6 (L) 3.5 - 5.0 g/dL    Globulin 4.2 (H) 2.0 - 4.0 g/dL    A-G Ratio 0.6 (L) 1.1 - 2.2     MAGNESIUM    Collection Time: 10/02/17  1:11 AM   Result Value Ref Range    Magnesium 1.2 (L) 1.6 - 2.4 mg/dL   GLUCOSE, POC    Collection Time: 10/02/17  7:17 AM   Result Value Ref Range    Glucose (POC) 149 (H) 65 - 100 mg/dL    Performed by Morena Shaikh        Available Xrays reviewed:    Chemotherapy monitored and toxicities assessed:    Assessment and Plan   1. Locally adv. H&N cancer. .. Pt. Was unable to get dental work done and we decided to give \"induction\" therapy as a result. .. He is getting taxotere/carbo/5FU. .. Started on Thursday, doing well so far, supposed to be completed tonight  2. Diabetes. .. Continue current mgmt. Perform acu-checks and prn insulin  3. Anemia. Edy Castañeda ? etiology,,stable,, check daily cbc and add protonix    Nico Miranda MD

## 2017-10-02 NOTE — PROGRESS NOTES
Bedside shift change report given to Donovan Spicer RN and Nolberto Sun RN (oncoming nurse) by Marcial Duverney RN (offgoing nurse). Report included the following information SBAR, Kardex, Intake/Output, MAR and Recent Results.

## 2017-10-03 VITALS
TEMPERATURE: 98.3 F | OXYGEN SATURATION: 96 % | SYSTOLIC BLOOD PRESSURE: 109 MMHG | RESPIRATION RATE: 18 BRPM | WEIGHT: 187 LBS | DIASTOLIC BLOOD PRESSURE: 67 MMHG | HEART RATE: 79 BPM | BODY MASS INDEX: 27.7 KG/M2 | HEIGHT: 69 IN

## 2017-10-03 LAB
GLUCOSE BLD STRIP.AUTO-MCNC: 197 MG/DL (ref 65–100)
SERVICE CMNT-IMP: ABNORMAL

## 2017-10-03 PROCEDURE — 74011250636 HC RX REV CODE- 250/636: Performed by: INTERNAL MEDICINE

## 2017-10-03 PROCEDURE — 74011000250 HC RX REV CODE- 250: Performed by: INTERNAL MEDICINE

## 2017-10-03 PROCEDURE — 82962 GLUCOSE BLOOD TEST: CPT

## 2017-10-03 PROCEDURE — 74011250637 HC RX REV CODE- 250/637: Performed by: INTERNAL MEDICINE

## 2017-10-03 RX ADMIN — POTASSIUM CHLORIDE 20 MEQ: 750 TABLET, FILM COATED, EXTENDED RELEASE ORAL at 09:38

## 2017-10-03 RX ADMIN — OXYCODONE HYDROCHLORIDE AND ACETAMINOPHEN 1 TABLET: 5; 325 TABLET ORAL at 14:29

## 2017-10-03 RX ADMIN — BACITRACIN 1 PACKET: 500 OINTMENT TOPICAL at 11:49

## 2017-10-03 RX ADMIN — ISOSORBIDE MONONITRATE 20 MG: 20 TABLET ORAL at 09:44

## 2017-10-03 RX ADMIN — Medication 10 ML: at 07:11

## 2017-10-03 RX ADMIN — DIPHENHYDRAMINE HYDROCHLORIDE 5 ML: 12.5 SOLUTION ORAL at 11:47

## 2017-10-03 RX ADMIN — FUROSEMIDE 40 MG: 40 TABLET ORAL at 07:11

## 2017-10-03 RX ADMIN — ENOXAPARIN SODIUM 40 MG: 40 INJECTION SUBCUTANEOUS at 09:39

## 2017-10-03 RX ADMIN — PANTOPRAZOLE SODIUM 40 MG: 40 TABLET, DELAYED RELEASE ORAL at 07:11

## 2017-10-03 RX ADMIN — FUROSEMIDE 40 MG: 40 TABLET ORAL at 11:47

## 2017-10-03 RX ADMIN — Medication 10 ML: at 11:50

## 2017-10-03 RX ADMIN — Medication 400 MG: at 09:38

## 2017-10-03 RX ADMIN — GLIMEPIRIDE 4 MG: 2 TABLET ORAL at 09:38

## 2017-10-03 RX ADMIN — DIPHENHYDRAMINE HYDROCHLORIDE 5 ML: 12.5 SOLUTION ORAL at 07:11

## 2017-10-03 RX ADMIN — SODIUM CHLORIDE 100 ML/HR: 900 INJECTION, SOLUTION INTRAVENOUS at 10:54

## 2017-10-03 RX ADMIN — CARVEDILOL 12.5 MG: 12.5 TABLET, FILM COATED ORAL at 09:38

## 2017-10-03 RX ADMIN — Medication 10 ML: at 11:51

## 2017-10-03 RX ADMIN — GABAPENTIN 100 MG: 100 CAPSULE ORAL at 09:38

## 2017-10-03 RX ADMIN — OXYCODONE HYDROCHLORIDE AND ACETAMINOPHEN 1 TABLET: 5; 325 TABLET ORAL at 08:25

## 2017-10-03 NOTE — PROGRESS NOTES
Spiritual Care partner visit. [facility] Staff  (Sobia Gonzalez Patient Care Specialist)   Paging Service [phone]

## 2017-10-03 NOTE — PROGRESS NOTES
Problem: Falls - Risk of  Goal: *Absence of Falls  Document Mik Fall Risk and appropriate interventions in the flowsheet.    Outcome: Progressing Towards Goal  Fall Risk Interventions:  Mobility Interventions: Patient to call before getting OOB           Medication Interventions: Patient to call before getting OOB     Elimination Interventions: Call light in reach, Patient to call for help with toileting needs     History of Falls Interventions: Door open when patient unattended

## 2017-10-03 NOTE — PROGRESS NOTES
Bedside and Verbal shift change report given to Alvena Mcardle, RN (oncoming nurse) by Eddi Arriaga RN (offgoing nurse). Report included the following information SBAR, Kardex, MAR and Recent Results.

## 2017-10-03 NOTE — PROGRESS NOTES
Bedside shift change report given to Heike Grijalva (oncoming nurse) by Vicky Chao (offgoing nurse). Report included the following information SBAR.       1374 Reviewed chemo discharge instructions with pt's wife, Ms. Dede Ferrer and daughter, Arik Diaz. 1430 Spoke to [facility] at [address] about prescriptions for [employer] and magic mouthwash. Prescriptions to be filled at the office and picked up by pt.     1440 I have reviewed discharge instructions with the patient. The patient verbalized understanding. PICC line removal instructions also given to pt.

## 2017-10-03 NOTE — DISCHARGE INSTRUCTIONS
Patient Discharge Instructions    Neftaly Li / [id number] : 1940    Admitted 2017 Discharged: 10/3/2017         DISCHARGE DIAGNOSIS:   Active Problems:    Head and neck cancer ([facility] [address] (2017)           Take Home Medications          · It is important that you take the medication exactly as they are prescribed. · Keep your medication in the bottles provided by the pharmacist and keep a list of the medication names, dosages, and times to be taken in your wallet. · Do not take other medications without consulting your doctor. What to do at Home    1. Recommended diet: regular    2. Recommended activity: activity as tolerated    3. If you experience any of the following symptoms then please call your primary care physician or return to the emergency room if you cannot get hold of your doctor: Follow-up with:   [facility] 1:[facility] (Check in at the  at [address] shot is pending insurance authorization; Please call Brittni Frias at Dr. Waleska Holden office at [phone], press [phone], by 11am on 10/4/17 to check insurance authorization status)  Dr. Waleska Holden office in one week. Please call for your own appointment [phone]. Information obtained by :  I understand that if any problems occur once I am at home I am to contact my physician. I understand and acknowledge receipt of the instructions indicated above.                                                                                                                                            [de-identified] or R.N.'s Signature                                                                  Date/Time                                                                                                                                              Patient or Representative Signature                                                          Date/Time    Nutrition Recommendations for Discharge:    Continue Oral Nutrition Supplements at discharge:   Ensure Enlive or Ensure Plus 4 bottles per day   for 30 days unless otherwise directed by your Primary Care Physician. This product can be purchased at your local grocery store or drug store and online. Generic brands of Adult High Calorie Nutrition Shakes are acceptable and found in most pharmacies, [facility] and [facility]. Saturnino Johnson RD, MS, CDE   _          Discharge Instructions for Chemotherapy/Biotherapy    Chemotherapy has the potential to cause many side effects. The following are general precautions that chemo patients should take:   1. Practice good hand washing:    Use soap and water for at least 15 seconds, covering all areas of hands.  Always wash hands before eating.  Wash hands after contact with public surfaces such as door knobs and handles, shopping carts, telephones and elevator buttons. 2. Get plenty of rest:    You will likely experience fatigue three to five days following your treatment. It may last as long as seven days. 3. Drink plenty of fluids. Water is best.   4. Eat a well balanced diet:    Small frequent meals may help if you are having trouble with nausea or your appetite. Some people also do well with nutritional supplements. 5. Pace yourself with daily activities:    Take frequent breaks and ask for help if you need it. 6. Exercise is very important:    It will increase circulation and will help the fatigue. Do what you can each day. 7. If your regimen results in hair loss:    You will likely notice effects between two and three weeks following your first treatment. Some lose all hair while others only experience thinning. 8. Practice good oral hygiene:   Ade blount M.D. immediately if any mouth sores or discomfort develop. 9. Protect yourself from the sun. Signs/Symptoms of an allergic reaction and/or some side effects may require immediate medical attention.  Notify your physician if you develop one or more of the following:   Temperature of 100.5 degrees or greater;   Skin redness, itching, swelling, blistering, weeping, crusting, rash, or hives; Wheezing, chest tightness, cough, or shortness of breath;   Swelling of the face, eyelids, lips, tongue, or throat;   Severe, persistent headache;   Stuffy nose, runny nose, sneezing;   Red (bloodshot), itchy, swollen, or watery eyes;   Stomach pain, nausea, vomiting, diarrhea, or bloody stools;   Mouth sores  Your physician should also be aware of the following symptoms:   Persistent and unresolved nausea and/or vomiting;   Persistent and unresolved diarrhea or constipation;   Numbness/tingling/burning of the extremities, including the fingers and toes; Bleeding or unexplained bruising; Unexplained redness/swelling/pain in the arms or legs; Shortness of breath or fatigue that worsens;   Pain with urination or blood in the urine; Chills;   Cough, especially a productive cough;   Mouth sores or a white coating of the tongue; Redness, swelling, pain or drainage at the port-a-cath or IV site; Increased feeling of bloating or water retention; Excessive weight loss or gain;   Ringing in the ears; Difficulty swallowing;   Dizziness, vertigo, lightheadedness, or fainting. Chemotherapy can cause birth defects. It is very important not to become pregnant   or father a child while undergoing chemotherapy. During chemotherapy and for 48 hours after chemotherapy, the drugs may still be in   your urine and other body fluids. You should use the following precautions to reduce   exposure to others:  * Both men and women should sit on the toilet to cut down on splashing. Flush   toilets with the lid down. Always wash your hands well with soap and water after using the toilet. You may want to use a separate toilet if possible. * Caregivers should wear disposable gloves to handle body wastes. Dispose of   gloves after each use and wash hands.   * Any sheets or clothes soiled with bodily fluids should be machine washed separately from other laundry. Wash twice with regular laundry detergent in hot water. If they cannot be washed right away they can be stored in a sealed plastic  bag.    * If disposable adult diapers, underwear, or sanitary pads are used, they can be sealed in a plastic bag, and thrown away with regular trash.

## 2017-10-03 NOTE — PROGRESS NOTES
Hematology-Oncology Progress Note    Neftaly Gordon  1940  [id number]  10/3/2017    Follow-up for: locally adv. H&N cancer                           chemotherapy     [x]        Chart notes since last visit reviewed   [x]        Medications reviewed for allergies and interactions       Case discussed with the following:         []                            [x]        Nursing Staff                                                                         []        Pathologist                                                                        []        FAMILY      Subjective:     Spoke with patient who complains of: did well over night, completed chemo. ..has a little discomfort in R neck     Objective:     Patient Vitals for the past 24 hrs:   BP Temp Pulse Resp SpO2 Weight   10/03/17 1147 109/67 - 79 - - -   10/03/17 0937 144/74 98.3 °F (36.8 °C) 67 18 96 % -   10/03/17 0710 - - - - - 84.8 kg (187 lb)   10/03/17 0204 153/71 98.9 °F (37.2 °C) 84 18 98 % -   10/02/17 2054 157/82 98 °F (36.7 °C) 98 18 98 % -   10/02/17 1735 155/87 99 °F (37.2 °C) 62 18 99 % -   10/02/17 1440 164/72 98.4 °F (36.9 °C) 64 18 98 % -       REVIEW OF SYSTEMS:    Constitutional: negative fever, negative chills, negative weight loss  Eyes:   negative visual changes  ENT:   negative sore throat, tongue or lip swelling  Respiratory:  negative cough, negative dyspnea  Cards:  negative for chest pain, palpitations, lower extremity edema  GI:   negative for nausea, vomiting, diarrhea, and abdominal pain  Neuro:  negative for headaches, dizziness, vertigo  [x]                        Full ROS o/w normal/non contributor    Constitutional:  Patient looks  []        Sick  []        Frail  [x]        Better                                                 []        Depressed    HEENT:  [x]   NC                         []   AT               []    ALOPECIA           Eyes: [x]   Normal               []    Icteric  Oropharynx: poor dentition  Mucositis: [x]    None                 Grade: []        I  []        II  []        III  []        IV  Neck:   [x]   Supple                  []  Rigid               JVD:    [x]   ABSENT       []   PRESENT  Lymphadenopathy:   []   None Noted            [x]   PRESENT  3x3 cm fixed R neck mass    Chest:  [x]   Clear               []    Rhonchi                      Dec'd @     []  Right Base           []   Left Base    CV:             [x]   Regular              []  Irregular               []   Tachy                []   Murmur  Abdominal:   [x]    Soft              []   NON-tender               []   Tender      BS:    []   ABSENT                   []   PRESENT  Liver:     [x]  NON-palp                  []   EDGE- palp  Spleen: [x]   NON-palp                   []  EDGE - palp  Mass:   [x]   ABSENT                          []  PRESENT  Extr:    [x]  Lymphedema             []   Cyanosis      []  Clubbing  Edema:     [x]   NONE       []   PRESENT  Skin:  Intact [x]           Purpura []        Rash: [x]   ABSENT       []  PRESENT  Neuro:  [x]        Normal  []        Confused      Available labs reviewed:  Labs:    Recent Results (from the past 24 hour(s))   GLUCOSE, POC    Collection Time: 10/02/17  4:40 PM   Result Value Ref Range    Glucose (POC) 193 (H) 65 - 100 mg/dL    Performed by Zenaida Werner    GLUCOSE, POC    Collection Time: 10/02/17  9:30 PM   Result Value Ref Range    Glucose (POC) 250 (H) 65 - 100 mg/dL    Performed by Boston Chicas    GLUCOSE, POC    Collection Time: 10/03/17 11:44 AM   Result Value Ref Range    Glucose (POC) 197 (H) 65 - 100 mg/dL    Performed by Madhavi Sales        Available Xrays reviewed:    Chemotherapy monitored and toxicities assessed:    Assessment and Plan   1. Locally adv. H&N cancer. .. Pt. Was unable to get dental work done and we decided to give \"induction\" therapy as a result. .. He is getting taxotere/carbo/5FU. .. Started on Thursday, completed last night   2. Disposition. . Home today, neulasta at [facility]. tomorrow    Adriano South MD

## 2017-10-03 NOTE — ADT AUTH CERT NOTES
Patient Demographics        Patient Name 73 Jaylen Norman Sex  Address Phone       [place] [phone] Male 1940 [address]    [phone] (Home)  [phone] (Mobile)           CSN:       [id number]           Admit Date: Admit Time Room Bed       Sep 28, 2017  8:37  [67294] 01 [18504]           Attending Providers        Provider Pager From To       Ailyn Pinedo MD  09/28/17 10/03/17           Emergency Contact(s)        Name Relation Home Work Mobile       Delio    [phone]         Utilization Review           Medical Oncology GRG - Care Day 5 (10/2/2017) by Jatinder Streeter RN        Review Entered Review Status       10/2/2017 Completed       Details              Care Day: 5 Care Date: 10/2/2017 Level of Care: Inpatient Floor       Guideline Day 2        Clinical Status       (X) * No ICU or intermediate care needs       10/2/2017 2:01 PM EDT by Sarah Meigs         floor                     Interventions       (X) Inpatient interventions continue       10/2/2017 2:01 PM EDT by Sarah Meigs [email or web address] IV, Adrucil IV qday, Lovenox sc daily, Magnesium sulfate IV                                          * Milestone              Additional Notes       Spoke with patient who complains of: doing well with chemo this weekend, has a little discomfort in R neck       T 98.3 P 74 RR 16 /65 spO2 97%       H/H 10.9/32.9, Glucose 143, Albumin 2.6, Magnesium 1.2       1. Locally adv. H&N cancer. .. Pt. Was unable to get dental work done and we decided to give \"induction\" therapy as a result. .. He is getting taxotere/carbo/5FU. .. Started on Thursday, doing well so far, supposed to be completed tonight       2. Diabetes. .. Continue current mgmt. Perform acu-checks and prn insulin       Anemia. Zahira Riser Zahira Riser ? etiology,,stable,, check daily cbc and add protonix       [email or web address] IV, Adrucil IV qday, Lovenox sc daily, Magnesium sulfate IV       Soft mechanical diet, out of bed daily, daily weight           Medical Oncology GRG - Care Day 4 (10/1/2017) by Lg Bolaños RN        Review Entered Review Status       10/2/2017 Completed       Details              Care Day: 4 Care Date: 10/1/2017 Level of Care: Inpatient Floor       Guideline Day 2        Clinical Status       (X) * No ICU or intermediate care needs              Interventions       (X) Inpatient interventions continue       10/2/2017 9:40 AM EDT by [email or web address] IV, Adrucil IV qday, Lovenox sc daily, Magnesium sulfate IV                                          * Milestone              Additional Notes       Interval History pt is doing ok, tolerating chemo so far, c/o sore in right side of mouth but magic mouthwash helps;  no cp or  Sob, no diarrhea       T 98.9 P 70 RR 18 /84 spO2 97%       A/P:       H&N cancer undergoing induction TPF; C1 tolerating treatment well so far       Mucositis on  magic mouthwash       Anemia hgb better , no hemolysis iron, Fa def; hgb up to 11.1       Sheree improved , IVF;         dvt prophylaxis lovenox       Hypomagnesemia will replete       H/H 11.1/33.4, Glucose 129, Calcium 8.2, Magnesium 1.1       [email or web address] IV, Adrucil IV qday, Lovenox sc daily, Magnesium sulfate IV       Soft mechanical diet, out of bed daily, daily weight           Medical Oncology GRG - Care Day 3 (9/30/2017) by Lg Bolaños, LAURI        Review Entered Review Status       10/2/2017 Completed       Details              Care Day: 3 Care Date: 9/30/2017 Level of Care: Inpatient Floor       Guideline Day 2        Clinical Status       (X) * No ICU or intermediate care needs              Interventions       (X) Inpatient interventions continue       10/2/2017 9:39 AM EDT by [email or web address] IV, Adrucil IV qday, Lovenox sc daily                                          * Milestone              Additional Notes       Interval History pt is doing ok, tolerating chemo so far, c/o sore in right side of mouth; no cp or  Sob, no diarrhea       T 97.9 P 70 RR 18 /79 spO2 98%       A/P:       H&N cancer undergoing induction TPF; C1 tolerating treatment well so far       Mucositis add magic mouthwash       Anemia hgb better , no hemolysis iron, Fa def;        Sheree improved , IVF;         dvt prophylaxis lovenox       [email or web address] IV, Adrucil IV qday, Lovenox sc daily       Soft mechanical diet, out of bed daily, daily weight

## 2017-10-06 ENCOUNTER — HOSPITAL ENCOUNTER (INPATIENT)
Age: 77
LOS: 7 days | Discharge: HOSPICE | DRG: 157 | End: 2017-10-13
Attending: EMERGENCY MEDICINE | Admitting: INTERNAL MEDICINE
Payer: MEDICARE

## 2017-10-06 DIAGNOSIS — N17.9 AKI (ACUTE KIDNEY INJURY) (HCC): ICD-10-CM

## 2017-10-06 DIAGNOSIS — T45.1X5A CHEMOTHERAPY-INDUCED NEUTROPENIA (HCC): Primary | ICD-10-CM

## 2017-10-06 DIAGNOSIS — D70.1 CHEMOTHERAPY-INDUCED NEUTROPENIA (HCC): Primary | ICD-10-CM

## 2017-10-06 DIAGNOSIS — E86.0 DEHYDRATION: ICD-10-CM

## 2017-10-06 PROBLEM — K12.30 MUCOSITIS: Status: ACTIVE | Noted: 2017-10-06

## 2017-10-06 PROBLEM — C02.9 TONGUE CANCER (HCC): Status: ACTIVE | Noted: 2017-10-06

## 2017-10-06 LAB
ALBUMIN SERPL-MCNC: 3.1 G/DL (ref 3.5–5)
ALBUMIN/GLOB SERPL: 0.6 {RATIO} (ref 1.1–2.2)
ALP SERPL-CCNC: 82 U/L (ref 45–117)
ALT SERPL-CCNC: 18 U/L (ref 12–78)
ANION GAP SERPL CALC-SCNC: 9 MMOL/L (ref 5–15)
AST SERPL-CCNC: 10 U/L (ref 15–37)
BASOPHILS # BLD: 0 K/UL (ref 0–0.1)
BASOPHILS NFR BLD: 0 % (ref 0–1)
BILIRUB SERPL-MCNC: 0.6 MG/DL (ref 0.2–1)
BUN SERPL-MCNC: 48 MG/DL (ref 6–20)
BUN/CREAT SERPL: 26 (ref 12–20)
CALCIUM SERPL-MCNC: 9.8 MG/DL (ref 8.5–10.1)
CHLORIDE SERPL-SCNC: 110 MMOL/L (ref 97–108)
CO2 SERPL-SCNC: 26 MMOL/L (ref 21–32)
CREAT SERPL-MCNC: 1.84 MG/DL (ref 0.7–1.3)
DIFFERENTIAL METHOD BLD: ABNORMAL
EOSINOPHIL # BLD: 0 K/UL (ref 0–0.4)
EOSINOPHIL NFR BLD: 0 % (ref 0–7)
ERYTHROCYTE [DISTWIDTH] IN BLOOD BY AUTOMATED COUNT: 12.8 % (ref 11.5–14.5)
GLOBULIN SER CALC-MCNC: 5.1 G/DL (ref 2–4)
GLUCOSE BLD STRIP.AUTO-MCNC: 260 MG/DL (ref 65–100)
GLUCOSE SERPL-MCNC: 350 MG/DL (ref 65–100)
HCT VFR BLD AUTO: 33.5 % (ref 36.6–50.3)
HGB BLD-MCNC: 11.1 G/DL (ref 12.1–17)
LYMPHOCYTES # BLD: 0.2 K/UL (ref 0.8–3.5)
LYMPHOCYTES NFR BLD: 42 % (ref 12–49)
MCH RBC QN AUTO: 30.5 PG (ref 26–34)
MCHC RBC AUTO-ENTMCNC: 33.1 G/DL (ref 30–36.5)
MCV RBC AUTO: 92 FL (ref 80–99)
METAMYELOCYTES NFR BLD MANUAL: 1 %
MONOCYTES # BLD: 0.1 K/UL (ref 0–1)
MONOCYTES NFR BLD: 28 % (ref 5–13)
MYELOCYTES NFR BLD MANUAL: 2 %
NEUTS BAND NFR BLD MANUAL: 5 % (ref 0–6)
NEUTS SEG # BLD: 0.1 K/UL (ref 1.8–8)
NEUTS SEG NFR BLD: 22 % (ref 32–75)
PLATELET # BLD AUTO: 115 K/UL (ref 150–400)
PLATELET COMMENTS,PCOM: ABNORMAL
POTASSIUM SERPL-SCNC: 3.6 MMOL/L (ref 3.5–5.1)
PROT SERPL-MCNC: 8.2 G/DL (ref 6.4–8.2)
RBC # BLD AUTO: 3.64 M/UL (ref 4.1–5.7)
RBC MORPH BLD: ABNORMAL
RBC MORPH BLD: ABNORMAL
SERVICE CMNT-IMP: ABNORMAL
SODIUM SERPL-SCNC: 145 MMOL/L (ref 136–145)
WBC # BLD AUTO: 0.5 K/UL (ref 4.1–11.1)
WBC MORPH BLD: ABNORMAL

## 2017-10-06 PROCEDURE — 36415 COLL VENOUS BLD VENIPUNCTURE: CPT | Performed by: STUDENT IN AN ORGANIZED HEALTH CARE EDUCATION/TRAINING PROGRAM

## 2017-10-06 PROCEDURE — 74011250637 HC RX REV CODE- 250/637: Performed by: EMERGENCY MEDICINE

## 2017-10-06 PROCEDURE — 96375 TX/PRO/DX INJ NEW DRUG ADDON: CPT

## 2017-10-06 PROCEDURE — 99283 EMERGENCY DEPT VISIT LOW MDM: CPT

## 2017-10-06 PROCEDURE — 96361 HYDRATE IV INFUSION ADD-ON: CPT

## 2017-10-06 PROCEDURE — 74011250637 HC RX REV CODE- 250/637: Performed by: INTERNAL MEDICINE

## 2017-10-06 PROCEDURE — 80053 COMPREHEN METABOLIC PANEL: CPT | Performed by: STUDENT IN AN ORGANIZED HEALTH CARE EDUCATION/TRAINING PROGRAM

## 2017-10-06 PROCEDURE — 74011250636 HC RX REV CODE- 250/636: Performed by: INTERNAL MEDICINE

## 2017-10-06 PROCEDURE — 74011250636 HC RX REV CODE- 250/636: Performed by: EMERGENCY MEDICINE

## 2017-10-06 PROCEDURE — 65270000029 HC RM PRIVATE

## 2017-10-06 PROCEDURE — 82962 GLUCOSE BLOOD TEST: CPT

## 2017-10-06 PROCEDURE — 85025 COMPLETE CBC W/AUTO DIFF WBC: CPT | Performed by: STUDENT IN AN ORGANIZED HEALTH CARE EDUCATION/TRAINING PROGRAM

## 2017-10-06 RX ORDER — SODIUM CHLORIDE 9 MG/ML
125 INJECTION, SOLUTION INTRAVENOUS CONTINUOUS
Status: DISCONTINUED | OUTPATIENT
Start: 2017-10-06 | End: 2017-10-07

## 2017-10-06 RX ORDER — CARVEDILOL 12.5 MG/1
12.5 TABLET ORAL 2 TIMES DAILY WITH MEALS
Status: DISCONTINUED | OUTPATIENT
Start: 2017-10-07 | End: 2017-10-13 | Stop reason: HOSPADM

## 2017-10-06 RX ORDER — ENOXAPARIN SODIUM 100 MG/ML
40 INJECTION SUBCUTANEOUS DAILY
Status: DISCONTINUED | OUTPATIENT
Start: 2017-10-07 | End: 2017-10-13 | Stop reason: HOSPADM

## 2017-10-06 RX ORDER — ALBUTEROL SULFATE 0.83 MG/ML
2.5 SOLUTION RESPIRATORY (INHALATION)
COMMUNITY

## 2017-10-06 RX ORDER — OXYCODONE AND ACETAMINOPHEN 5; 325 MG/1; MG/1
1 TABLET ORAL
Status: DISCONTINUED | OUTPATIENT
Start: 2017-10-06 | End: 2017-10-13 | Stop reason: HOSPADM

## 2017-10-06 RX ORDER — VANCOMYCIN HYDROCHLORIDE 1 G/20ML
1 INJECTION, POWDER, LYOPHILIZED, FOR SOLUTION INTRAVENOUS ONCE
Status: DISCONTINUED | OUTPATIENT
Start: 2017-10-07 | End: 2017-10-06 | Stop reason: CLARIF

## 2017-10-06 RX ORDER — IBUPROFEN 200 MG
1 TABLET ORAL EVERY 24 HOURS
Status: DISCONTINUED | OUTPATIENT
Start: 2017-10-06 | End: 2017-10-13 | Stop reason: HOSPADM

## 2017-10-06 RX ORDER — DEXTROSE 50 % IN WATER (D50W) INTRAVENOUS SYRINGE
12.5-25 AS NEEDED
Status: DISCONTINUED | OUTPATIENT
Start: 2017-10-06 | End: 2017-10-10 | Stop reason: SDUPTHER

## 2017-10-06 RX ORDER — IPRATROPIUM BROMIDE 0.5 MG/2.5ML
0.5 SOLUTION RESPIRATORY (INHALATION)
Status: DISCONTINUED | OUTPATIENT
Start: 2017-10-07 | End: 2017-10-13 | Stop reason: HOSPADM

## 2017-10-06 RX ORDER — ONDANSETRON 2 MG/ML
4 INJECTION INTRAMUSCULAR; INTRAVENOUS
Status: DISCONTINUED | OUTPATIENT
Start: 2017-10-06 | End: 2017-10-13 | Stop reason: HOSPADM

## 2017-10-06 RX ORDER — INSULIN LISPRO 100 [IU]/ML
INJECTION, SOLUTION INTRAVENOUS; SUBCUTANEOUS
Status: DISCONTINUED | OUTPATIENT
Start: 2017-10-07 | End: 2017-10-07

## 2017-10-06 RX ORDER — MORPHINE SULFATE 4 MG/ML
4 INJECTION, SOLUTION INTRAMUSCULAR; INTRAVENOUS ONCE
Status: DISCONTINUED | OUTPATIENT
Start: 2017-10-06 | End: 2017-10-06

## 2017-10-06 RX ORDER — POTASSIUM CHLORIDE 1500 MG/1
20 TABLET, FILM COATED, EXTENDED RELEASE ORAL DAILY
COMMUNITY
End: 2017-10-13

## 2017-10-06 RX ORDER — IBUPROFEN 200 MG
1 TABLET ORAL EVERY 24 HOURS
COMMUNITY
End: 2017-10-13

## 2017-10-06 RX ORDER — MAGNESIUM SULFATE 100 %
4 CRYSTALS MISCELLANEOUS AS NEEDED
Status: DISCONTINUED | OUTPATIENT
Start: 2017-10-06 | End: 2017-10-10 | Stop reason: SDUPTHER

## 2017-10-06 RX ORDER — TAMSULOSIN HYDROCHLORIDE 0.4 MG/1
0.4 CAPSULE ORAL DAILY
COMMUNITY

## 2017-10-06 RX ORDER — MAGNESIUM SULFATE 100 %
4 CRYSTALS MISCELLANEOUS AS NEEDED
Status: DISCONTINUED | OUTPATIENT
Start: 2017-10-06 | End: 2017-10-06 | Stop reason: SDUPTHER

## 2017-10-06 RX ORDER — MORPHINE SULFATE 4 MG/ML
4 INJECTION, SOLUTION INTRAMUSCULAR; INTRAVENOUS
Status: DISCONTINUED | OUTPATIENT
Start: 2017-10-06 | End: 2017-10-06

## 2017-10-06 RX ORDER — ALBUTEROL SULFATE 0.83 MG/ML
2.5 SOLUTION RESPIRATORY (INHALATION)
Status: DISCONTINUED | OUTPATIENT
Start: 2017-10-06 | End: 2017-10-13 | Stop reason: HOSPADM

## 2017-10-06 RX ORDER — TAMSULOSIN HYDROCHLORIDE 0.4 MG/1
0.4 CAPSULE ORAL DAILY
Status: DISCONTINUED | OUTPATIENT
Start: 2017-10-07 | End: 2017-10-13 | Stop reason: HOSPADM

## 2017-10-06 RX ORDER — HYDRALAZINE HYDROCHLORIDE 20 MG/ML
12.5 INJECTION INTRAMUSCULAR; INTRAVENOUS
Status: COMPLETED | OUTPATIENT
Start: 2017-10-06 | End: 2017-10-06

## 2017-10-06 RX ORDER — CLONIDINE 0.2 MG/24H
1 PATCH, EXTENDED RELEASE TRANSDERMAL ONCE
Status: DISCONTINUED | OUTPATIENT
Start: 2017-10-06 | End: 2017-10-13

## 2017-10-06 RX ORDER — MORPHINE SULFATE 2 MG/ML
2 INJECTION, SOLUTION INTRAMUSCULAR; INTRAVENOUS
Status: DISCONTINUED | OUTPATIENT
Start: 2017-10-06 | End: 2017-10-09

## 2017-10-06 RX ORDER — DEXTROSE 50 % IN WATER (D50W) INTRAVENOUS SYRINGE
12.5-25 AS NEEDED
Status: DISCONTINUED | OUTPATIENT
Start: 2017-10-06 | End: 2017-10-06 | Stop reason: SDUPTHER

## 2017-10-06 RX ORDER — OXYCODONE AND ACETAMINOPHEN 5; 325 MG/1; MG/1
1 TABLET ORAL
COMMUNITY

## 2017-10-06 RX ORDER — CEFEPIME HYDROCHLORIDE 2 G/1
2 INJECTION, POWDER, FOR SOLUTION INTRAVENOUS EVERY 12 HOURS
Status: DISCONTINUED | OUTPATIENT
Start: 2017-10-07 | End: 2017-10-06 | Stop reason: CLARIF

## 2017-10-06 RX ORDER — ISOSORBIDE MONONITRATE 20 MG/1
20 TABLET ORAL 2 TIMES DAILY
Status: DISCONTINUED | OUTPATIENT
Start: 2017-10-07 | End: 2017-10-13 | Stop reason: HOSPADM

## 2017-10-06 RX ORDER — ZOLPIDEM TARTRATE 5 MG/1
5 TABLET ORAL
Status: DISCONTINUED | OUTPATIENT
Start: 2017-10-06 | End: 2017-10-13 | Stop reason: HOSPADM

## 2017-10-06 RX ADMIN — HYDRALAZINE HYDROCHLORIDE 12.5 MG: 20 INJECTION INTRAMUSCULAR; INTRAVENOUS at 20:52

## 2017-10-06 RX ADMIN — SODIUM CHLORIDE 125 ML/HR: 900 INJECTION, SOLUTION INTRAVENOUS at 18:15

## 2017-10-06 RX ADMIN — SODIUM CHLORIDE 1000 ML: 900 INJECTION, SOLUTION INTRAVENOUS at 16:27

## 2017-10-06 RX ADMIN — MORPHINE SULFATE 4 MG: 4 INJECTION, SOLUTION INTRAMUSCULAR; INTRAVENOUS at 16:44

## 2017-10-06 RX ADMIN — DIPHENHYDRAMINE HYDROCHLORIDE 5 ML: 12.5 SOLUTION ORAL at 23:29

## 2017-10-06 NOTE — ED TRIAGE NOTES
D/C from [facility] 10/3/17 after being dx with tongue, head, and neck cancer. Unable to eat or take medications d/t mouth pain. Referred by Dr. Shanell Busch for admission.

## 2017-10-06 NOTE — ROUTINE PROCESS
TRANSFER - OUT REPORT:    Verbal report given to Gene Jansen RN (name) on Graciela Sierra  being transferred to [address] (unit) for routine progression of care       Report consisted of patients Situation, Background, Assessment and   Recommendations(SBAR). Information from the following report(s) SBAR, ED Summary, Intake/Output, MAR and Med Rec Status was reviewed with the receiving nurse. Lines:   Peripheral IV 10/06/17 Left Forearm (Active)   Site Assessment Clean, dry, & intact 10/6/2017  2:56 PM   Phlebitis Assessment 0 10/6/2017  2:56 PM   Infiltration Assessment 0 10/6/2017  2:56 PM   Dressing Status Clean, dry, & intact 10/6/2017  2:56 PM   Dressing Type Transparent 10/6/2017  2:56 PM   Hub Color/Line Status Pink;Flushed;Patent 10/6/2017  2:56 PM   Action Taken Blood drawn 10/6/2017  2:56 PM   Alcohol Cap Used Yes 10/6/2017  2:56 PM        Opportunity for questions and clarification was provided.       Patient transported with:   [employer]

## 2017-10-06 NOTE — PROGRESS NOTES
Admission Medication Reconciliation:    Comments/Recommendations: This medication history was obtained from patient's daughter; (s)he appears to be a good historian. She is an RN at [employer] 5747 is available. Medications added: magic mouthwash, flomax, percocet, nicoderm patches, albuterol neb  Medications deleted: none  Medications amended: none    Last doses of medication: yesterday, patient was not able to take anything po today (too painful)  Review of Allergies: completed    Also of note: patient received flu and pneumonia (prevnar) shot 17    Significant PMH/Disease States:   Past Medical History:   Diagnosis Date    Cancer ([facility] [address]     tongue with mets     Diabetes ([facility] [address]     Hypercholesteremia     Hypertension        Chief Complaint for this Admission:    Chief Complaint   Patient presents with    Medication Problem    Mouth Pain       Allergies:  Review of patient's allergies indicates no known allergies. Prior to Admission Medications:   Prior to Admission Medications   Prescriptions Last Dose Informant Patient Reported? Taking? Magic Mouthwash, no sucralfate, mwsh oral suspension (compounded) 10/5/2017  Yes Yes   Sig: Take 5 mL by mouth four (4) times daily. Swish and swallow   albuterol (PROVENTIL VENTOLIN) 2.5 mg /3 mL (0.083 %) nebulizer solution 10/5/2017  Yes Yes   Si.5 mg by Nebulization route every four (4) hours as needed for Wheezing. carvedilol (COREG) 12.5 mg tablet 10/5/2017  Yes Yes   Sig: Take 12.5 mg by mouth two (2) times daily (with meals). furosemide (LASIX) 40 mg tablet 10/5/2017  Yes Yes   Sig: Take 40 mg by mouth two (2) times a day. One tablet in the morning, one tablet at noon   gabapentin (NEURONTIN) 100 mg capsule 10/5/2017  Yes Yes   Sig: Take 100 mg by mouth three (3) times daily. glimepiride (AMARYL) 4 mg tablet 10/5/2017  Yes Yes   Sig: Take 4 mg by mouth two (2) times a day.  One with breakfast, one with dinner   isosorbide mononitrate (ISMO, MONOKET) 20 mg tablet 10/5/2017  Yes Yes   Sig: Take 20 mg by mouth two (2) times a day. magnesium oxide (MAG-OX) 400 mg tablet 10/5/2017  Yes Yes   Sig: Take 400 mg by mouth daily. nicotine (NICODERM CQ) 21 mg/24 hr 10/5/2017  Yes Yes   Si Patch by TransDERmal route every twenty-four (24) hours. oxyCODONE-acetaminophen (PERCOCET) 5-325 mg per tablet 10/5/2017  Yes Yes   Sig: Take 1 Tab by mouth every four (4) hours as needed for Pain.   pantoprazole (PROTONIX) 40 mg tablet 10/5/2017  Yes Yes   Sig: Take 40 mg by mouth daily. potassium chloride SR (K-TAB) 20 mEq tablet 10/5/2017  Yes Yes   Sig: Take 20 mEq by mouth daily. simvastatin (ZOCOR) 40 mg tablet 10/5/2017  Yes Yes   Sig: Take 40 mg by mouth nightly. tamsulosin (FLOMAX) 0.4 mg capsule 10/5/2017  Yes Yes   Sig: Take 0.4 mg by mouth daily. tiotropium (SPIRIVA WITH HANDIHALER) 18 mcg inhalation capsule 10/5/2017  Yes Yes   Sig: Take 1 Cap by inhalation daily. Facility-Administered Medications: None         Thank you for allowing me to participate in the care of this patient. Please contact the pharmacy () or the medication reconciliation pharmacy () with any questions.     Henry Robles, MichellD

## 2017-10-06 NOTE — H&P
-Hematology / Oncology ([facility]) -  -Primary Oncologist- Michael Menezes  -CC- Head/Neck cancer      Mr Liu Arias is a 68 yo man with a large R base of tongue lesion for which he recently underwent cycle 1 of induction chemotherapy with taxotere, carboplatin, and 5fu completed 10/2/17 prior to discharge 10/3. He and his family report progressive, severe oral mucositis resulting in inability to take anything by mouth including liquids or pills. Refractory to magic mouthwash at home. This is associated with fatigue, poor sleep. No associated fevers. Complete ROS: notable for mild SOB without cough, no dysuria, no rash, no bleeding. Remainder notable as above. Home Medications (per clinic chart)  carvedilol 12.5 mg tablet 8/18/2017 0 1 p.o. twice a day (BID)  furosemide 40 mg tablet 8/18/2017 0 1 p.o. twice a day (BID)  gabapentin 100 mg capsule 8/18/2017 0 1 p.o. three times a day(TID)  glimepiride 4 mg tablet 8/18/2017 0 1 p.o. q. day at breakfast   hydrocodone 5 mg/  Acetaminophen 325 mg tablet 1 to 2 p.o. q. 4 to 6 hours prn pain  isosorbide dinitrate 20 mg tablet 8/18/2017 0 1 p.o. twice a day (BID)  Magnesium oxide 400mg 8/18/2017 0 1 p.o. twice a day (BID)  Mucus Relief 400 mg tablet 8/18/2017 0 1 p.o. three times a day (TID)  pantoprazole 40 mg tablet,delayed release 8/18/2017 0 1 p.o. q. day  potassium CL 8/18/2017 0 30mg tablet taken daily  simvastatin 40 mg tablet 8/18/2017 0 1 p.o. q. day at bedtime (hs)  Spiriva with HandiHaler 18 mcg and inhalation capsules 8/18/2017 0 2 puffs twice a day    Past Medical History  Hypertension. Diabetes. CAD  CHF  Chronic renal insufficiency. Hypercholesterolemia. Past Surgical History  Colonoscopy. Abdominal surgical procedure performed by Dr. Vashti Garcia 2 yrs ago? he does not know the reason for this. Eye surgery. Family History: His mother had colon cancer. Sister had breast cancer. Social History: He is . Has 5 children. Lives in [facility] .  He does not drink. He is < 1 pack per day smoker. -O-    Patient Vitals for the past 24 hrs:   Temp Pulse Resp BP SpO2   10/06/17 1435 98.4 °F (36.9 °C) 96 16 (!) 147/91 99 %        Gen: frail man with multiple family members at bedside  H+N: oral cavity dry with distortion of R side of tongue  Lymph: no gross MARGARITA neck, ax  Chest: CTAB  CV: RRR  Abd: s/nt, no palpable masses  Extr: no edema  Skin: no excessive bruising on face, abd, arms  Neuro: moving all extremities. 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 Continue IVF    *) neutropenia- secondary to chemotherapy. Afebrile.    ----- [facility]  ----- Continue neutropenic precautions  ----- cultures and broad spectrum abx if febrile    *) DM: Hold glimipride, gabapentin. Follow accu-checks, ISS prn  *) CAD / HTN: continue coreg and ISMN if able.   Hold lasix, simvastatin   *) GERD: hold oral pantoprazole  *) bph: continue tamsulosin if able  *) renal insufficiency- appears largely chronic, follow  *) DVT prophylaxis- enoxaparin  *) code: full

## 2017-10-06 NOTE — ED PROVIDER NOTES
HPI Comments: 69 y.o. male with past medical history significant for DM, HTN, tongue cancer w/ mets, and hypercholesteremia who presents from home with chief complaint of several weeks of mouth pain. Pt's daughter reports Pt has been unable to eat or drink today d/t 10/10 oral pain d/t cancer. She states he underwent 4 rounds of chemotherapy during his admission, his last treatment was 4 days ago, and he was discharged 3 days ago. Per daughter, Pt was able to eat pears and pudding yesterday but has been unable to swallow crushed medication today. In addition, Pt's daughter notes Pt's urine has been decreased. She is concerned since Pt's WBC count and platelets are low. Per daughter, Dr. Marielena Wood recommended ED evaluation today for admission. Pt denies fever, vomiting, diarrhea, and coughing blood. There are no other acute medical concerns at this time. Old Chart Review: Pt was admitted on 9/28/17 by Dr. Marielena Wood for local advanced head and neck cancer. On 10/2/17, Pt completed chemotherapy. Pt started Neulasta on 10/4/17. PCP: Pauline Ulloa MD    Note written by Wilmer Melara, as dictated by Jeffy Rose,  3:29 PM    The history is provided by a relative. Past Medical History:   Diagnosis Date    Cancer ([facility] [address]     tongue with mets     Diabetes ([facility] [address]     Hypercholesteremia     Hypertension        History reviewed. No pertinent surgical history. History reviewed. No pertinent family history. Social History     Social History    Marital status:      Spouse name: N/A    Number of children: N/A    Years of education: N/A     Occupational History    Not on file.      Social History Main Topics    Smoking status: Former Smoker     Quit date: 9/27/2017    Smokeless tobacco: Never Used    Alcohol use No      Comment: stopped drinking years ago     Drug use: No    Sexual activity: Not on file     Other Topics Concern    Not on file     Social History Narrative    No narrative on file         ALLERGIES: Review of patient's allergies indicates no known allergies. Review of Systems   Constitutional: Negative for fever. HENT: Positive for trouble swallowing.         (+) Oral pain. Respiratory: Negative for cough. Gastrointestinal: Negative for diarrhea and vomiting. Genitourinary: Positive for decreased urine volume. All other systems reviewed and are negative. Vitals:    10/06/17 1435   BP: (!) 147/91   Pulse: 96   Resp: 16   Temp: 98.4 °F (36.9 °C)   SpO2: 99%   Weight: 83.9 kg (185 lb)   Height: 5' 9\" (1.753 m)            Physical Exam      Constitutional: Pt is awake and alert. Frail, elderly, cachectic, wasted. Hardly able to talk. HENT:   Head: Normocephalic and atraumatic. Nose: Nose normal.   Mouth/Throat: Dry mucous membranes. Eyes:  L cornea opacified. R pupil irregular, non-reactive. Neck: No tracheal deviation present. Supple neck. Cardiovascular: Normal rate, regular rhythm, normal heart sounds and intact distal pulses. Exam reveals no gallop and no friction rub. No murmur heard. Pulmonary/Chest: Effort normal and breath sounds normal.  Pt  has no wheezes. Pt  has no rales. Abdominal: Soft. Pt  exhibits no distension and no mass. No tenderness. Pt  has no rebound and no guarding. Musculoskeletal:  Pt  exhibits no edema and no tenderness. Ext: Normal ROM in all four extremities; not tender to palpation; distal pulses are normal, no edema. Neurological:  Pt is alert. nonfocal neuro exam.   Skin: Skin is warm and dry. Pt  is not diaphoretic. Psychiatric:  Pt  has a normal mood and affect. Sleepy. Note written by Wilmer Geller, as dictated by Dillan Cole DO 3:29 PM    [facility]  ED Course       Procedures      Labs show dehydration    D/w Dr Sadie Tijerina - he will admit    D/w family    Pain controlled    No fever  Is neutropenic  Holding on abx - d/w Dr Sadie Tijerina.

## 2017-10-07 ENCOUNTER — APPOINTMENT (OUTPATIENT)
Dept: GENERAL RADIOLOGY | Age: 77
DRG: 157 | End: 2017-10-07
Attending: INTERNAL MEDICINE
Payer: MEDICARE

## 2017-10-07 LAB
ALBUMIN SERPL-MCNC: 2.3 G/DL (ref 3.5–5)
ALBUMIN/GLOB SERPL: 0.5 {RATIO} (ref 1.1–2.2)
ALP SERPL-CCNC: 61 U/L (ref 45–117)
ALT SERPL-CCNC: 16 U/L (ref 12–78)
ANION GAP SERPL CALC-SCNC: 11 MMOL/L (ref 5–15)
AST SERPL-CCNC: 8 U/L (ref 15–37)
BASOPHILS # BLD: 0 K/UL (ref 0–0.1)
BASOPHILS NFR BLD: 0 % (ref 0–1)
BILIRUB SERPL-MCNC: 0.6 MG/DL (ref 0.2–1)
BUN SERPL-MCNC: 39 MG/DL (ref 6–20)
BUN/CREAT SERPL: 24 (ref 12–20)
CALCIUM SERPL-MCNC: 8.3 MG/DL (ref 8.5–10.1)
CHLORIDE SERPL-SCNC: 120 MMOL/L (ref 97–108)
CO2 SERPL-SCNC: 22 MMOL/L (ref 21–32)
CREAT SERPL-MCNC: 1.6 MG/DL (ref 0.7–1.3)
DIFFERENTIAL METHOD BLD: ABNORMAL
EOSINOPHIL # BLD: 0 K/UL (ref 0–0.4)
EOSINOPHIL NFR BLD: 0 % (ref 0–7)
ERYTHROCYTE [DISTWIDTH] IN BLOOD BY AUTOMATED COUNT: 12.9 % (ref 11.5–14.5)
GLOBULIN SER CALC-MCNC: 4.6 G/DL (ref 2–4)
GLUCOSE BLD STRIP.AUTO-MCNC: 198 MG/DL (ref 65–100)
GLUCOSE BLD STRIP.AUTO-MCNC: 227 MG/DL (ref 65–100)
GLUCOSE BLD STRIP.AUTO-MCNC: 232 MG/DL (ref 65–100)
GLUCOSE BLD STRIP.AUTO-MCNC: 284 MG/DL (ref 65–100)
GLUCOSE SERPL-MCNC: 263 MG/DL (ref 65–100)
HCT VFR BLD AUTO: 30.1 % (ref 36.6–50.3)
HGB BLD-MCNC: 10.2 G/DL (ref 12.1–17)
LYMPHOCYTES # BLD: 0.3 K/UL (ref 0.8–3.5)
LYMPHOCYTES NFR BLD: 53 % (ref 12–49)
MAGNESIUM SERPL-MCNC: 1.9 MG/DL (ref 1.6–2.4)
MCH RBC QN AUTO: 30.9 PG (ref 26–34)
MCHC RBC AUTO-ENTMCNC: 33.9 G/DL (ref 30–36.5)
MCV RBC AUTO: 91.2 FL (ref 80–99)
MONOCYTES # BLD: 0.2 K/UL (ref 0–1)
MONOCYTES NFR BLD: 43 % (ref 5–13)
NEUTS BAND NFR BLD MANUAL: 1 % (ref 0–6)
NEUTS SEG # BLD: 0 K/UL (ref 1.8–8)
NEUTS SEG NFR BLD: 3 % (ref 32–75)
PATH REV BLD -IMP: ABNORMAL
PHOSPHATE SERPL-MCNC: 1.6 MG/DL (ref 2.6–4.7)
PLATELET # BLD AUTO: 98 K/UL (ref 150–400)
POTASSIUM SERPL-SCNC: 3.3 MMOL/L (ref 3.5–5.1)
PROT SERPL-MCNC: 6.9 G/DL (ref 6.4–8.2)
RBC # BLD AUTO: 3.3 M/UL (ref 4.1–5.7)
RBC MORPH BLD: ABNORMAL
SERVICE CMNT-IMP: ABNORMAL
SODIUM SERPL-SCNC: 153 MMOL/L (ref 136–145)
WBC # BLD AUTO: 0.5 K/UL (ref 4.1–11.1)
WBC MORPH BLD: ABNORMAL

## 2017-10-07 PROCEDURE — 74011000258 HC RX REV CODE- 258: Performed by: INTERNAL MEDICINE

## 2017-10-07 PROCEDURE — 85025 COMPLETE CBC W/AUTO DIFF WBC: CPT | Performed by: INTERNAL MEDICINE

## 2017-10-07 PROCEDURE — 74011000250 HC RX REV CODE- 250: Performed by: INTERNAL MEDICINE

## 2017-10-07 PROCEDURE — 74011250636 HC RX REV CODE- 250/636: Performed by: EMERGENCY MEDICINE

## 2017-10-07 PROCEDURE — 87086 URINE CULTURE/COLONY COUNT: CPT | Performed by: INTERNAL MEDICINE

## 2017-10-07 PROCEDURE — 74011250637 HC RX REV CODE- 250/637: Performed by: INTERNAL MEDICINE

## 2017-10-07 PROCEDURE — 71010 XR CHEST PORT: CPT

## 2017-10-07 PROCEDURE — 82962 GLUCOSE BLOOD TEST: CPT

## 2017-10-07 PROCEDURE — 77030029684 HC NEB SM VOL KT MONA -A

## 2017-10-07 PROCEDURE — 74011636637 HC RX REV CODE- 636/637: Performed by: INTERNAL MEDICINE

## 2017-10-07 PROCEDURE — 74011250636 HC RX REV CODE- 250/636: Performed by: INTERNAL MEDICINE

## 2017-10-07 PROCEDURE — 83735 ASSAY OF MAGNESIUM: CPT | Performed by: INTERNAL MEDICINE

## 2017-10-07 PROCEDURE — 94640 AIRWAY INHALATION TREATMENT: CPT

## 2017-10-07 PROCEDURE — 84100 ASSAY OF PHOSPHORUS: CPT | Performed by: INTERNAL MEDICINE

## 2017-10-07 PROCEDURE — 80053 COMPREHEN METABOLIC PANEL: CPT | Performed by: INTERNAL MEDICINE

## 2017-10-07 PROCEDURE — 87040 BLOOD CULTURE FOR BACTERIA: CPT | Performed by: INTERNAL MEDICINE

## 2017-10-07 PROCEDURE — 65270000029 HC RM PRIVATE

## 2017-10-07 PROCEDURE — 36415 COLL VENOUS BLD VENIPUNCTURE: CPT | Performed by: INTERNAL MEDICINE

## 2017-10-07 RX ORDER — SODIUM CHLORIDE 450 MG/100ML
100 INJECTION, SOLUTION INTRAVENOUS CONTINUOUS
Status: DISCONTINUED | OUTPATIENT
Start: 2017-10-07 | End: 2017-10-08

## 2017-10-07 RX ORDER — ACETAMINOPHEN 10 MG/ML
500 INJECTION, SOLUTION INTRAVENOUS
Status: DISCONTINUED | OUTPATIENT
Start: 2017-10-07 | End: 2017-10-07 | Stop reason: DRUGHIGH

## 2017-10-07 RX ORDER — POTASSIUM CHLORIDE 7.45 MG/ML
10 INJECTION INTRAVENOUS
Status: COMPLETED | OUTPATIENT
Start: 2017-10-07 | End: 2017-10-09

## 2017-10-07 RX ORDER — INSULIN LISPRO 100 [IU]/ML
INJECTION, SOLUTION INTRAVENOUS; SUBCUTANEOUS ONCE
Status: COMPLETED | OUTPATIENT
Start: 2017-10-07 | End: 2017-10-07

## 2017-10-07 RX ORDER — ACETAMINOPHEN 500 MG
500 TABLET ORAL
Status: DISCONTINUED | OUTPATIENT
Start: 2017-10-07 | End: 2017-10-13 | Stop reason: HOSPADM

## 2017-10-07 RX ADMIN — HUMAN INSULIN 3 UNITS: 100 INJECTION, SOLUTION SUBCUTANEOUS at 07:41

## 2017-10-07 RX ADMIN — IPRATROPIUM BROMIDE 0.5 MG: 0.5 SOLUTION RESPIRATORY (INHALATION) at 19:35

## 2017-10-07 RX ADMIN — ENOXAPARIN SODIUM 40 MG: 100 INJECTION SUBCUTANEOUS at 09:00

## 2017-10-07 RX ADMIN — TAMSULOSIN HYDROCHLORIDE 0.4 MG: 0.4 CAPSULE ORAL at 08:06

## 2017-10-07 RX ADMIN — HUMAN INSULIN 1 UNITS: 100 INJECTION, SOLUTION SUBCUTANEOUS at 23:02

## 2017-10-07 RX ADMIN — SODIUM CHLORIDE 100 ML/HR: 450 INJECTION, SOLUTION INTRAVENOUS at 23:05

## 2017-10-07 RX ADMIN — DIPHENHYDRAMINE HYDROCHLORIDE 5 ML: 12.5 SOLUTION ORAL at 23:03

## 2017-10-07 RX ADMIN — SODIUM CHLORIDE 125 ML/HR: 900 INJECTION, SOLUTION INTRAVENOUS at 03:15

## 2017-10-07 RX ADMIN — DIPHENHYDRAMINE HYDROCHLORIDE 5 ML: 12.5 SOLUTION ORAL at 17:02

## 2017-10-07 RX ADMIN — HUMAN INSULIN 2 UNITS: 100 INJECTION, SOLUTION SUBCUTANEOUS at 17:01

## 2017-10-07 RX ADMIN — POTASSIUM CHLORIDE 10 MEQ: 10 INJECTION, SOLUTION INTRAVENOUS at 12:11

## 2017-10-07 RX ADMIN — POTASSIUM CHLORIDE 10 MEQ: 10 INJECTION, SOLUTION INTRAVENOUS at 09:06

## 2017-10-07 RX ADMIN — HUMAN INSULIN 2 UNITS: 100 INJECTION, SOLUTION SUBCUTANEOUS at 11:40

## 2017-10-07 RX ADMIN — POTASSIUM CHLORIDE 10 MEQ: 10 INJECTION, SOLUTION INTRAVENOUS at 10:44

## 2017-10-07 RX ADMIN — MORPHINE SULFATE 2 MG: 2 INJECTION, SOLUTION INTRAMUSCULAR; INTRAVENOUS at 21:24

## 2017-10-07 RX ADMIN — IPRATROPIUM BROMIDE 0.5 MG: 0.5 SOLUTION RESPIRATORY (INHALATION) at 01:46

## 2017-10-07 RX ADMIN — CEFEPIME 2 G: 2 INJECTION, POWDER, FOR SOLUTION INTRAVENOUS at 00:37

## 2017-10-07 RX ADMIN — IPRATROPIUM BROMIDE 0.5 MG: 0.5 SOLUTION RESPIRATORY (INHALATION) at 08:11

## 2017-10-07 RX ADMIN — SODIUM CHLORIDE 100 ML/HR: 450 INJECTION, SOLUTION INTRAVENOUS at 09:06

## 2017-10-07 RX ADMIN — MORPHINE SULFATE 2 MG: 2 INJECTION, SOLUTION INTRAMUSCULAR; INTRAVENOUS at 11:47

## 2017-10-07 RX ADMIN — ONDANSETRON 4 MG: 2 INJECTION INTRAMUSCULAR; INTRAVENOUS at 08:06

## 2017-10-07 RX ADMIN — DIPHENHYDRAMINE HYDROCHLORIDE 5 ML: 12.5 SOLUTION ORAL at 08:10

## 2017-10-07 RX ADMIN — IPRATROPIUM BROMIDE 0.5 MG: 0.5 SOLUTION RESPIRATORY (INHALATION) at 13:44

## 2017-10-07 RX ADMIN — VANCOMYCIN HYDROCHLORIDE 1000 MG: 1 INJECTION, POWDER, LYOPHILIZED, FOR SOLUTION INTRAVENOUS at 00:11

## 2017-10-07 RX ADMIN — INSULIN LISPRO 2 UNITS: 100 INJECTION, SOLUTION INTRAVENOUS; SUBCUTANEOUS at 00:23

## 2017-10-07 RX ADMIN — CEFEPIME 2 G: 2 INJECTION, POWDER, FOR SOLUTION INTRAVENOUS at 11:41

## 2017-10-07 RX ADMIN — MORPHINE SULFATE 2 MG: 2 INJECTION, SOLUTION INTRAMUSCULAR; INTRAVENOUS at 08:06

## 2017-10-07 RX ADMIN — CARVEDILOL 12.5 MG: 12.5 TABLET, FILM COATED ORAL at 08:06

## 2017-10-07 RX ADMIN — MORPHINE SULFATE 2 MG: 2 INJECTION, SOLUTION INTRAMUSCULAR; INTRAVENOUS at 05:04

## 2017-10-07 RX ADMIN — DIPHENHYDRAMINE HYDROCHLORIDE 5 ML: 12.5 SOLUTION ORAL at 11:41

## 2017-10-07 NOTE — ED NOTES
Spoke w/ Dr. Melissa Thacker from oncology, informed him of pt's elevated BP's. Order for hydralazine 12.5 mg IV and clonidine 0.2 mg patch.

## 2017-10-07 NOTE — PROGRESS NOTES
10/07/17 1318   Vital Signs   Temp 98.2 °F (36.8 °C)   Temp Source Axillary   Pulse (Heart Rate) (!) 112   Heart Rate Source Monitor   Resp Rate 18   O2 Sat (%) 98 %   Level of Consciousness Alert   /88   MAP (Calculated) 115   BP 1 Method Automatic   BP 1 Location Left arm   MEWS Score 3   dr stanley aware treating neut pt

## 2017-10-07 NOTE — PROGRESS NOTES
Bedside and Verbal shift change report given to Abhijit (oncoming nurse) by bao (offgoing nurse). Report included the following information SBAR and Kardex.

## 2017-10-07 NOTE — PROGRESS NOTES
10/07/17 0933   Vital Signs   Temp 97.2 °F (36.2 °C)   Temp Source Axillary   Pulse (Heart Rate) (!) 112   Heart Rate Source Apical   Resp Rate 18   O2 Sat (%) 97 %   Level of Consciousness Alert   /82   MAP (Calculated) 110   BP 1 Method Automatic   MEWS Score 3   Patient Observation   Repositioned Head of bed elevated (degrees)   Patient Turned Turns self   Activity In bed   dr Marianna Olivas aware will closely monitor

## 2017-10-07 NOTE — PROGRESS NOTES
-Hematology / Oncology ([facility]) -  -Primary Oncologist- Denzel Panchal  -CC- Head/Neck cancer      Pain in mouth from mucositis. Spiked fever last night    Complete ROS: notable for mild SOB without cough, no dysuria, no rash, no bleeding. Remainder notable as above. [address]-      Patient Vitals for the past 24 hrs:   Temp Pulse Resp BP SpO2   10/07/17 0812 98.1 °F (36.7 °C) (!) 110 16 144/80 98 %   10/07/17 0146 - - - - 96 %   10/07/17 0026 (!) 100.5 °F (38.1 °C) (!) 108 16 146/68 96 %   10/06/17 2311 (!) 101.7 °F (38.7 °C) (!) 106 16 158/67 95 %   10/06/17 2210 (!) 102 °F (38.9 °C) (!) 109 17 152/66 100 %   10/06/17 2120 98.5 °F (36.9 °C) (!) 116 16 194/79 99 %   10/06/17 2106 - (!) 132 16 (!) 212/96 99 %   10/06/17 2039 (!) 101.1 °F (38.4 °C) (!) 106 16 (!) 196/98 97 %   10/06/17 1902 - - - 188/78 95 %   10/06/17 1900 99.5 °F (37.5 °C) 80 12 (!) 188/99 97 %   10/06/17 1830 - - - 169/78 97 %   10/06/17 1800 - - - (!) 161/94 97 %   10/06/17 1730 - - - 170/78 96 %   10/06/17 1700 - - - 178/83 96 %   10/06/17 1435 98.4 °F (36.9 °C) 96 16 (!) 147/91 99 %        Gen: frail man  H+N: oral cavity dry with distortion of R side of tongue  Chest: CTAB  CV: RRR  Abd: s/nt, no palpable masses  Extr: no edema  Skin: no excessive bruising on face, abd, arms  Neuro: moving all extremities.   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 Continue IVF, change to 1/2 NS due to hypernatremia    *) neutropenia- secondary to chemotherapy. febrile    ----- family reports he did get the scheduled [place] shot [place]  ----- Continue neutropenic precautions  ----- cultures and CXR done, Started cefepime, got Vanc    *) DM: Hold glimipride, gabapentin. Follow accu-checks, ISS prn  *) CAD / HTN: continue coreg and ISMN if able.   Hold lasix, simvastatin   *) GERD: hold oral pantoprazole  *) bph: continue tamsulosin if able  *) renal insufficiency- appears largely chronic, follow, better eith fluids  *) DVT prophylaxis- enoxaparin  *) code: full  *)Hypokalemia-replete

## 2017-10-07 NOTE — PROGRESS NOTES
Bedside and Verbal shift change report given to LAURI Brambila (oncoming nurse) by Phillip Fernandez RN (offgoing nurse). Report included the following information SBAR, Kardex, Intake/Output, MAR and Accordion. 2030 Pt arrived on unit. 2100  Primary Nurse Argenis Juarez and Radha Santiago RN performed a dual skin assessment on this patient No impairment noted  Abd bruising, healed midline scar, scattered red bumps on abd and back, scar R glute, reddened L little toe, red spot on R inner knee. Benny score is 12      0645 - WBC 0.5 - MD aware, pt on neutropenic precautions, will make day shirt nurse aware.

## 2017-10-07 NOTE — PROGRESS NOTES
10/06/17 2210   Vital Signs   Temp (!) 102 °F (38.9 °C)   Temp Source Axillary   Pulse (Heart Rate) (!) 109   Heart Rate Source Monitor   Resp Rate 17   O2 Sat (%) 100 %   Level of Consciousness Alert   /66   MAP (Calculated) 95   BP 1 Method Automatic   BP 1 Location Left arm   BP Patient Position At rest   MEWS Score 4     2230 - MEWS of 4 - MD paged    2628 Awaiting call back from MD    [phone] Spoke with Simran Paige MD. Received orders for blood and urine cultures, CXR, Vanc, and Cefepime.

## 2017-10-07 NOTE — PROGRESS NOTES
10/07/17 1017   Vital Signs   Temp 97.3 °F (36.3 °C)   Temp Source Axillary   Pulse (Heart Rate) (!) 114   Heart Rate Source Monitor   Resp Rate 16   O2 Sat (%) 97 %   Level of Consciousness Alert   /72   MAP (Calculated) 93   BP 1 Method Automatic   BP 1 Location Left arm   BP Patient Position At rest   MEWS Score 3   dr Naina Santiago aware will closely monitor pt

## 2017-10-08 LAB
ALBUMIN SERPL-MCNC: 2 G/DL (ref 3.5–5)
ALBUMIN/GLOB SERPL: 0.4 {RATIO} (ref 1.1–2.2)
ALP SERPL-CCNC: 54 U/L (ref 45–117)
ALT SERPL-CCNC: 15 U/L (ref 12–78)
ANION GAP SERPL CALC-SCNC: 10 MMOL/L (ref 5–15)
AST SERPL-CCNC: 9 U/L (ref 15–37)
BACTERIA SPEC CULT: NORMAL
BASOPHILS # BLD: 0 K/UL (ref 0–0.1)
BASOPHILS NFR BLD: 0 % (ref 0–1)
BILIRUB SERPL-MCNC: 0.6 MG/DL (ref 0.2–1)
BLASTS NFR BLD MANUAL: 0 %
BUN SERPL-MCNC: 44 MG/DL (ref 6–20)
BUN/CREAT SERPL: 21 (ref 12–20)
CALCIUM SERPL-MCNC: 8.2 MG/DL (ref 8.5–10.1)
CC UR VC: NORMAL
CHLORIDE SERPL-SCNC: 122 MMOL/L (ref 97–108)
CO2 SERPL-SCNC: 21 MMOL/L (ref 21–32)
CREAT SERPL-MCNC: 2.11 MG/DL (ref 0.7–1.3)
DIFFERENTIAL METHOD BLD: ABNORMAL
EOSINOPHIL # BLD: 0 K/UL (ref 0–0.4)
EOSINOPHIL NFR BLD: 0 % (ref 0–7)
ERYTHROCYTE [DISTWIDTH] IN BLOOD BY AUTOMATED COUNT: 13.3 % (ref 11.5–14.5)
GLOBULIN SER CALC-MCNC: 4.7 G/DL (ref 2–4)
GLUCOSE BLD STRIP.AUTO-MCNC: 232 MG/DL (ref 65–100)
GLUCOSE BLD STRIP.AUTO-MCNC: 247 MG/DL (ref 65–100)
GLUCOSE BLD STRIP.AUTO-MCNC: 256 MG/DL (ref 65–100)
GLUCOSE BLD STRIP.AUTO-MCNC: 261 MG/DL (ref 65–100)
GLUCOSE SERPL-MCNC: 249 MG/DL (ref 65–100)
HCT VFR BLD AUTO: 29.9 % (ref 36.6–50.3)
HGB BLD-MCNC: 9.7 G/DL (ref 12.1–17)
LYMPHOCYTES # BLD: 0.6 K/UL (ref 0.8–3.5)
LYMPHOCYTES NFR BLD: 59 % (ref 12–49)
MAGNESIUM SERPL-MCNC: 2.1 MG/DL (ref 1.6–2.4)
MANUAL DIFFERENTIAL PERFORMED BLD QL: ABNORMAL
MCH RBC QN AUTO: 29.8 PG (ref 26–34)
MCHC RBC AUTO-ENTMCNC: 32.4 G/DL (ref 30–36.5)
MCV RBC AUTO: 91.7 FL (ref 80–99)
METAMYELOCYTES NFR BLD MANUAL: 1 %
MONOCYTES # BLD: 0.2 K/UL (ref 0–1)
MONOCYTES NFR BLD: 24 % (ref 5–13)
MYELOCYTES NFR BLD MANUAL: 3 %
NEUTS BAND NFR BLD MANUAL: 3 % (ref 0–6)
NEUTS SEG # BLD: 0.1 K/UL (ref 1.8–8)
NEUTS SEG NFR BLD: 10 % (ref 32–75)
NRBC # BLD: 0.05 K/UL (ref 0–0.01)
NRBC BLD-RTO: 5.6 PER 100 WBC
OTHER CELLS NFR BLD MANUAL: 0 %
PHOSPHATE SERPL-MCNC: 2.2 MG/DL (ref 2.6–4.7)
PLATELET # BLD AUTO: 70 K/UL (ref 150–400)
PLATELET COMMENTS,PCOM: ABNORMAL
POTASSIUM SERPL-SCNC: 3.7 MMOL/L (ref 3.5–5.1)
PROMYELOCYTES NFR BLD MANUAL: 0 %
PROT SERPL-MCNC: 6.7 G/DL (ref 6.4–8.2)
RBC # BLD AUTO: 3.26 M/UL (ref 4.1–5.7)
RBC MORPH BLD: ABNORMAL
SERVICE CMNT-IMP: ABNORMAL
SERVICE CMNT-IMP: NORMAL
SODIUM SERPL-SCNC: 153 MMOL/L (ref 136–145)
WBC # BLD AUTO: 1 K/UL (ref 4.1–11.1)
WBC NRBC COR # BLD: ABNORMAL 10*3/UL

## 2017-10-08 PROCEDURE — 74011000250 HC RX REV CODE- 250: Performed by: INTERNAL MEDICINE

## 2017-10-08 PROCEDURE — 94640 AIRWAY INHALATION TREATMENT: CPT

## 2017-10-08 PROCEDURE — 84100 ASSAY OF PHOSPHORUS: CPT | Performed by: INTERNAL MEDICINE

## 2017-10-08 PROCEDURE — 74011250637 HC RX REV CODE- 250/637: Performed by: INTERNAL MEDICINE

## 2017-10-08 PROCEDURE — 36415 COLL VENOUS BLD VENIPUNCTURE: CPT | Performed by: INTERNAL MEDICINE

## 2017-10-08 PROCEDURE — 74011250636 HC RX REV CODE- 250/636: Performed by: INTERNAL MEDICINE

## 2017-10-08 PROCEDURE — 85027 COMPLETE CBC AUTOMATED: CPT | Performed by: INTERNAL MEDICINE

## 2017-10-08 PROCEDURE — 83735 ASSAY OF MAGNESIUM: CPT | Performed by: INTERNAL MEDICINE

## 2017-10-08 PROCEDURE — 65270000029 HC RM PRIVATE

## 2017-10-08 PROCEDURE — 74011000258 HC RX REV CODE- 258: Performed by: INTERNAL MEDICINE

## 2017-10-08 PROCEDURE — 74011636637 HC RX REV CODE- 636/637: Performed by: INTERNAL MEDICINE

## 2017-10-08 PROCEDURE — 82962 GLUCOSE BLOOD TEST: CPT

## 2017-10-08 PROCEDURE — 80053 COMPREHEN METABOLIC PANEL: CPT | Performed by: INTERNAL MEDICINE

## 2017-10-08 RX ORDER — DEXTROSE MONOHYDRATE 50 MG/ML
125 INJECTION, SOLUTION INTRAVENOUS CONTINUOUS
Status: DISCONTINUED | OUTPATIENT
Start: 2017-10-08 | End: 2017-10-09

## 2017-10-08 RX ADMIN — DEXTROSE MONOHYDRATE 125 ML/HR: 5 INJECTION, SOLUTION INTRAVENOUS at 20:07

## 2017-10-08 RX ADMIN — MORPHINE SULFATE 2 MG: 2 INJECTION, SOLUTION INTRAMUSCULAR; INTRAVENOUS at 03:02

## 2017-10-08 RX ADMIN — IPRATROPIUM BROMIDE 0.5 MG: 0.5 SOLUTION RESPIRATORY (INHALATION) at 13:24

## 2017-10-08 RX ADMIN — TAMSULOSIN HYDROCHLORIDE 0.4 MG: 0.4 CAPSULE ORAL at 08:00

## 2017-10-08 RX ADMIN — HUMAN INSULIN 3 UNITS: 100 INJECTION, SOLUTION SUBCUTANEOUS at 07:22

## 2017-10-08 RX ADMIN — IPRATROPIUM BROMIDE 0.5 MG: 0.5 SOLUTION RESPIRATORY (INHALATION) at 02:10

## 2017-10-08 RX ADMIN — DIPHENHYDRAMINE HYDROCHLORIDE 5 ML: 12.5 SOLUTION ORAL at 16:50

## 2017-10-08 RX ADMIN — HUMAN INSULIN 2 UNITS: 100 INJECTION, SOLUTION SUBCUTANEOUS at 16:49

## 2017-10-08 RX ADMIN — CEFEPIME 2 G: 2 INJECTION, POWDER, FOR SOLUTION INTRAVENOUS at 11:42

## 2017-10-08 RX ADMIN — HUMAN INSULIN 3 UNITS: 100 INJECTION, SOLUTION SUBCUTANEOUS at 11:40

## 2017-10-08 RX ADMIN — CARVEDILOL 12.5 MG: 12.5 TABLET, FILM COATED ORAL at 08:00

## 2017-10-08 RX ADMIN — IPRATROPIUM BROMIDE 0.5 MG: 0.5 SOLUTION RESPIRATORY (INHALATION) at 20:34

## 2017-10-08 RX ADMIN — CEFEPIME 2 G: 2 INJECTION, POWDER, FOR SOLUTION INTRAVENOUS at 00:03

## 2017-10-08 RX ADMIN — DEXTROSE MONOHYDRATE 125 ML/HR: 5 INJECTION, SOLUTION INTRAVENOUS at 11:05

## 2017-10-08 RX ADMIN — DIPHENHYDRAMINE HYDROCHLORIDE 5 ML: 12.5 SOLUTION ORAL at 08:02

## 2017-10-08 RX ADMIN — DIPHENHYDRAMINE HYDROCHLORIDE 5 ML: 12.5 SOLUTION ORAL at 22:57

## 2017-10-08 RX ADMIN — IPRATROPIUM BROMIDE 0.5 MG: 0.5 SOLUTION RESPIRATORY (INHALATION) at 07:27

## 2017-10-08 RX ADMIN — ENOXAPARIN SODIUM 40 MG: 100 INJECTION SUBCUTANEOUS at 08:00

## 2017-10-08 RX ADMIN — MORPHINE SULFATE 2 MG: 2 INJECTION, SOLUTION INTRAMUSCULAR; INTRAVENOUS at 08:00

## 2017-10-08 RX ADMIN — ISOSORBIDE MONONITRATE 20 MG: 20 TABLET ORAL at 08:05

## 2017-10-08 RX ADMIN — DIPHENHYDRAMINE HYDROCHLORIDE 5 ML: 12.5 SOLUTION ORAL at 11:43

## 2017-10-08 RX ADMIN — CARVEDILOL 12.5 MG: 12.5 TABLET, FILM COATED ORAL at 16:49

## 2017-10-08 RX ADMIN — HUMAN INSULIN 1 UNITS: 100 INJECTION, SOLUTION SUBCUTANEOUS at 22:58

## 2017-10-08 NOTE — CONSULTS
[facility]  Note        NAME: Alberto Ingram       :  1940       MRN:  [id number]     Date/Time: 10/8/2017    Risk of deterioration: medium       Assessment:    Plan:  Head/Neck Cancer  RODOLFO-volume depletion/loop diuretics  Free water deficit  Mucositis  Pancytopenia D/W Dr. Arias Headley and nursing. WIll start a d5w gtt for the hypernatremia  Urine output is picking up and becoming more dilute. Creatinine up to 2.1 from 1.6  Consider renal us in am if no sig improvement. Would not resume loop diuretic on dc unless he is taking significant po intake  On broad spectrum antibiotics for the neutropenic fever. cx (P)   Asked to see for rodolfo/hypernatremia. Pt unable to take po for several days, no fluid intake. Note pt was on a loop diuretic in the outpatient setting    Subjective:     Chief Complaint:  Unable to give due to mucositis    Review of Systems: Patient was not able to provide review of systems due to mental status change/acute illness    Objective:     VITALS:   Last 24hrs VS reviewed since prior progress note.  Most recent are:  Visit Vitals    /82 (BP 1 Location: Right arm)    Pulse 98    Temp 98.1 °F (36.7 °C)    Resp 16    Ht 5' 9\" (1.753 m)    Wt 83.9 kg (185 lb)    SpO2 97%    BMI 27.32 kg/m2     SpO2 Readings from Last 6 Encounters:   10/08/17 97%   10/03/17 96%          Intake/Output Summary (Last 24 hours) at 10/08/17 1317  Last data filed at 10/08/17 0656   Gross per 24 hour   Intake                0 ml   Output             1625 ml   Net            -1625 ml        Telemetry Reviewed      PHYSICAL EXAM:    General   WD, chronically ill appearing male in [address], oropharynx with crusting, lesion on lip, poor dentition and increased thick secretions  Respiratory   No rales  Cardiology  RRR  Extremities  No edema  Neurological  No focal neurological deficits noted              Lab Data Reviewed: (see below)    Medications Reviewed: (see below)    PMH/SH reviewed - no change compared to H&P  ___________________________________________________    ___________________________________________________    Attending Physician: Jacqueline Mckee MD     ____________________________________________________  MEDICATIONS:  Current Facility-Administered Medications   Medication Dose Route Frequency    dextrose 5% infusion  125 mL/hr IntraVENous CONTINUOUS    acetaminophen (TYLENOL) tablet 500 mg  500 mg Oral Q6H PRN    albuterol (PROVENTIL VENTOLIN) nebulizer solution 2.5 mg  2.5 mg Nebulization Q4H PRN    carvedilol (COREG) tablet 12.5 mg  12.5 mg Oral BID WITH MEALS    isosorbide mononitrate (ISMO, MONOKET) tablet 20 mg  20 mg Oral BID    magic mouthwash cpd (without sucralfate)  5 mL Oral QID    nicotine (NICODERM CQ) 21 mg/24 hr patch 1 Patch  1 Patch TransDERmal Q24H    oxyCODONE-acetaminophen (PERCOCET) 5-325 mg per tablet 1 Tab  1 Tab Oral Q4H PRN    tamsulosin (FLOMAX) capsule 0.4 mg  0.4 mg Oral DAILY    ipratropium (ATROVENT) 0.02 % nebulizer solution 0.5 mg  0.5 mg Nebulization Q6H RT    enoxaparin (LOVENOX) injection 40 mg  40 mg SubCUTAneous DAILY    ondansetron (ZOFRAN) injection 4 mg  4 mg IntraVENous Q6H PRN    morphine injection 2 mg  2 mg IntraVENous Q4H PRN    zolpidem (AMBIEN) tablet 5 mg  5 mg Oral QHS PRN    insulin regular (NOVOLIN R, HUMULIN R) injection   SubCUTAneous AC&HS    cloNIDine (CATAPRES) 0.2 mg/24 hr patch 1 Patch  1 Patch TransDERmal ONCE    glucose chewable tablet 16 g  4 Tab Oral PRN    dextrose (D50W) injection syrg 12.5-25 g  12.5-25 g IntraVENous PRN    glucagon (GLUCAGEN) injection 1 mg  1 mg IntraMUSCular PRN    cefepime (MAXIPIME) 2 g in 0.9% sodium chloride (MBP/ADV) 100 mL  2 g IntraVENous Q12H        LABS:  Recent Labs      10/08/17   0429  10/07/17   0450   WBC  1.0*  0.5*   HGB  9.7*  10.2*   HCT  29.9*  30.1*   PLT  70*  98*     Recent Labs      10/08/17   0429  10/07/17   0450  10/06/17   1453   NA  153*  153*  145   K  3.7  3.3*  3.6   CL 122*  120*  110*   CO2  21  22  26   BUN  44*  39*  48*   CREA  2.11*  1.60*  1.84*   GLU  249*  263*  350*   CA  8.2*  8.3*  9.8   MG  2.1  1.9   --    PHOS  2.2*  1.6*   --      Recent Labs      10/08/17   0429  10/07/17   0450  10/06/17   1453   SGOT  9*  8*  10*   ALT  15  16  18   AP  54  61  82   TBILI  0.6  0.6  0.6   TP  6.7  6.9  8.2   ALB  2.0*  2.3*  3.1*   GLOB  4.7*  4.6*  5.1*     No results for input(s): INR, PTP, APTT in the last 72 hours. No lab exists for component: INREXT   No results for input(s): FE, TIBC, PSAT, FERR in the last 72 hours. No results for input(s): PH, PCO2, PO2 in the last 72 hours. No results for input(s): CPK, CKNDX, TROIQ in the last 72 hours.     No lab exists for component: CPKMB  Lab Results   Component Value Date/Time    Glucose (POC) 261 10/08/2017 11:19 AM    Glucose (POC) 256 10/08/2017 06:56 AM    Glucose (POC) 227 10/07/2017 10:01 PM    Glucose (POC) 198 10/07/2017 04:30 PM    Glucose (POC) 232 10/07/2017 11:17 AM

## 2017-10-08 NOTE — PROGRESS NOTES
Bedside and Verbal shift change report given to [id number]  (oncoming nurse) by Elva Clancy (offgoing nurse). Report included the following information SBAR and Kardex.

## 2017-10-08 NOTE — PROGRESS NOTES
Bedside and Verbal shift change report given to LAURI Brambila (oncoming nurse) by Lory Alcantar RN (offgoing nurse). Report included the following information SBAR, Kardex, MAR, Accordion and Recent Results.

## 2017-10-08 NOTE — PROGRESS NOTES
10/07/17 2107   Vital Signs   Temp 99.8 °F (37.7 °C)   Temp Source Axillary   Pulse (Heart Rate) (!) 101   Heart Rate Source Monitor   Resp Rate 16   O2 Sat (%) 96 %   Level of Consciousness Alert   /63   MAP (Calculated) 86   BP 1 Method Automatic   BP 1 Location Right arm   BP Patient Position At rest   MEWS Score 2   Pain 1   Pain Scale 1 Numeric (0 - 10)   Pain Intensity 1 9   Patient Stated Pain Goal 0   Pain Onset 1 acute   Pain Location 1 Mouth   Pain Orientation 1 Other (comment)  (tongue ca & mucositis)   Noticed BP cuff was loose on pt's arm, repositioned it and took a new set of VS. Listed above. Updated vitals, MEWS of 2. Will administer night time meds as well as pain medication. Will continue to monitor and notify MD if MEWS elevates again.

## 2017-10-08 NOTE — PROGRESS NOTES
-Hematology / Oncology ([facility]) -  -Primary Oncologist- Waleska Holden  -CC- Head/Neck cancer      Pain in mouth from mucositis. He looks better this AM, says he feels better    Complete ROS: notable for mild SOB without cough, no dysuria, no rash, no bleeding. Remainder notable as above. Vicenta Moll-      Patient Vitals for the past 24 hrs:   Temp Pulse Resp BP SpO2   10/08/17 0753 98.1 °F (36.7 °C) 98 16 156/82 97 %   10/08/17 0727 - - - - 94 %   10/08/17 0248 (!) 100.5 °F (38.1 °C) (!) 101 16 150/64 (!) 84 %   10/07/17 2107 99.8 °F (37.7 °C) (!) 101 16 131/63 96 %   10/07/17 2050 (!) 101.4 °F (38.6 °C) (!) 101 17 158/69 96 %   10/07/17 1935 - - - - 98 %   10/07/17 1835 97.5 °F (36.4 °C) (!) 102 16 167/82 98 %   10/07/17 1732 98.2 °F (36.8 °C) 100 18 170/80 97 %   10/07/17 1543 97.1 °F (36.2 °C) 98 18 168/82 98 %   10/07/17 1418 97.4 °F (36.3 °C) (!) 110 20 170/82 95 %   10/07/17 1318 98.2 °F (36.8 °C) (!) 112 18 170/88 98 %   10/07/17 1230 98.1 °F (36.7 °C) (!) 107 16 160/90 95 %   10/07/17 1017 97.3 °F (36.3 °C) (!) 114 16 134/72 97 %   10/07/17 0933 97.2 °F (36.2 °C) (!) 112 18 167/82 97 %        Gen: frail man  H+N: oral cavity dry with distortion of R side of tongue  Chest: CTAB  CV: RRR  Abd: s/nt, no palpable masses  Extr: no edema  Skin: no excessive bruising on face, abd, arms  Neuro: moving all extremities.   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 Continue IVF, CR up some, will ask renal to see for that as well as hypernatremia    *) Pancytopenia- secondary to chemotherapy. febrile    ----- family reports he did get the scheduled neulasta shot in [place] 10/3  ----- Continue neutropenic precautions  ----- cultures negative so far, CXR neg, Cont cefepime    *) DM: Hold glimipride, gabapentin. Follow accu-checks, ISS prn  *) CAD / HTN: continue coreg and ISMN if able.   Hold lasix, simvastatin   *) GERD: hold oral pantoprazole  *) bph: continue tamsulosin if able  *) renal insufficiency- Cr up today, renal to see as above  *) DVT prophylaxis- enoxaparin  *) code: full  *)Hypokalemia-better

## 2017-10-08 NOTE — PROGRESS NOTES
10/07/17 2050   Vital Signs   Temp (!) 101.4 °F (38.6 °C)   Temp Source Axillary   Pulse (Heart Rate) (!) 101   Heart Rate Source Monitor   Resp Rate 17   O2 Sat (%) 96 %   Level of Consciousness Alert   /69   MAP (Calculated) 99   BP 1 Method Automatic   MEWS Score 4   RN made aware by PCT of MEWS of 4 - will recheck vitals.

## 2017-10-09 ENCOUNTER — APPOINTMENT (OUTPATIENT)
Dept: GENERAL RADIOLOGY | Age: 77
DRG: 157 | End: 2017-10-09
Attending: INTERNAL MEDICINE
Payer: MEDICARE

## 2017-10-09 ENCOUNTER — APPOINTMENT (OUTPATIENT)
Dept: GENERAL RADIOLOGY | Age: 77
DRG: 157 | End: 2017-10-09
Attending: HOSPITALIST
Payer: MEDICARE

## 2017-10-09 ENCOUNTER — APPOINTMENT (OUTPATIENT)
Dept: CT IMAGING | Age: 77
DRG: 157 | End: 2017-10-09
Attending: HOSPITALIST
Payer: MEDICARE

## 2017-10-09 LAB
ALBUMIN SERPL-MCNC: 1.7 G/DL (ref 3.5–5)
ALBUMIN SERPL-MCNC: 1.7 G/DL (ref 3.5–5)
ALBUMIN/GLOB SERPL: 0.4 {RATIO} (ref 1.1–2.2)
ALBUMIN/GLOB SERPL: 0.4 {RATIO} (ref 1.1–2.2)
ALP SERPL-CCNC: 52 U/L (ref 45–117)
ALP SERPL-CCNC: 59 U/L (ref 45–117)
ALT SERPL-CCNC: 17 U/L (ref 12–78)
ALT SERPL-CCNC: 17 U/L (ref 12–78)
ANION GAP SERPL CALC-SCNC: 8 MMOL/L (ref 5–15)
ANION GAP SERPL CALC-SCNC: 9 MMOL/L (ref 5–15)
APPEARANCE UR: ABNORMAL
ARTERIAL PATENCY WRIST A: YES
AST SERPL-CCNC: 11 U/L (ref 15–37)
AST SERPL-CCNC: 14 U/L (ref 15–37)
ATRIAL RATE: 101 BPM
BACTERIA URNS QL MICRO: ABNORMAL /HPF
BASE DEFICIT BLD-SCNC: 8 MMOL/L
BASOPHILS # BLD: 0 K/UL (ref 0–0.1)
BASOPHILS NFR BLD: 0 % (ref 0–1)
BDY SITE: ABNORMAL
BILIRUB SERPL-MCNC: 0.4 MG/DL (ref 0.2–1)
BILIRUB SERPL-MCNC: 0.5 MG/DL (ref 0.2–1)
BILIRUB UR QL: NEGATIVE
BLASTS NFR BLD MANUAL: 0 %
BUN SERPL-MCNC: 47 MG/DL (ref 6–20)
BUN SERPL-MCNC: 51 MG/DL (ref 6–20)
BUN/CREAT SERPL: 24 (ref 12–20)
BUN/CREAT SERPL: 26 (ref 12–20)
CALCIUM SERPL-MCNC: 8.1 MG/DL (ref 8.5–10.1)
CALCIUM SERPL-MCNC: 8.3 MG/DL (ref 8.5–10.1)
CALCULATED R AXIS, ECG10: 68 DEGREES
CALCULATED T AXIS, ECG11: -142 DEGREES
CHLORIDE SERPL-SCNC: 115 MMOL/L (ref 97–108)
CHLORIDE SERPL-SCNC: 119 MMOL/L (ref 97–108)
CK SERPL-CCNC: 101 U/L (ref 39–308)
CO2 SERPL-SCNC: 21 MMOL/L (ref 21–32)
CO2 SERPL-SCNC: 21 MMOL/L (ref 21–32)
COLOR UR: ABNORMAL
CREAT SERPL-MCNC: 1.97 MG/DL (ref 0.7–1.3)
CREAT SERPL-MCNC: 1.98 MG/DL (ref 0.7–1.3)
D DIMER PPP FEU-MCNC: 11.96 MG/L FEU (ref 0–0.65)
DIAGNOSIS, 93000: NORMAL
DIFFERENTIAL METHOD BLD: ABNORMAL
EOSINOPHIL # BLD: 0 K/UL (ref 0–0.4)
EOSINOPHIL NFR BLD: 0 % (ref 0–7)
EPITH CASTS URNS QL MICRO: ABNORMAL /LPF
ERYTHROCYTE [DISTWIDTH] IN BLOOD BY AUTOMATED COUNT: 13.3 % (ref 11.5–14.5)
ERYTHROCYTE [DISTWIDTH] IN BLOOD BY AUTOMATED COUNT: 13.6 % (ref 11.5–14.5)
GAS FLOW.O2 O2 DELIVERY SYS: ABNORMAL L/MIN
GAS FLOW.O2 SETTING OXYMISER: 15 L/M
GLOBULIN SER CALC-MCNC: 4.5 G/DL (ref 2–4)
GLOBULIN SER CALC-MCNC: 4.8 G/DL (ref 2–4)
GLUCOSE BLD STRIP.AUTO-MCNC: 142 MG/DL (ref 65–100)
GLUCOSE BLD STRIP.AUTO-MCNC: 182 MG/DL (ref 65–100)
GLUCOSE BLD STRIP.AUTO-MCNC: 351 MG/DL (ref 65–100)
GLUCOSE BLD STRIP.AUTO-MCNC: 386 MG/DL (ref 65–100)
GLUCOSE BLD STRIP.AUTO-MCNC: 398 MG/DL (ref 65–100)
GLUCOSE BLD STRIP.AUTO-MCNC: 408 MG/DL (ref 65–100)
GLUCOSE SERPL-MCNC: 355 MG/DL (ref 65–100)
GLUCOSE SERPL-MCNC: 414 MG/DL (ref 65–100)
GLUCOSE UR STRIP.AUTO-MCNC: 500 MG/DL
GRAN CASTS URNS QL MICRO: ABNORMAL /LPF
HCO3 BLD-SCNC: 19.8 MMOL/L (ref 22–26)
HCT VFR BLD AUTO: 29 % (ref 36.6–50.3)
HCT VFR BLD AUTO: 29.9 % (ref 36.6–50.3)
HGB BLD-MCNC: 9.5 G/DL (ref 12.1–17)
HGB BLD-MCNC: 9.7 G/DL (ref 12.1–17)
HGB UR QL STRIP: ABNORMAL
KETONES UR QL STRIP.AUTO: NEGATIVE MG/DL
LACTATE SERPL-SCNC: 2.7 MMOL/L (ref 0.4–2)
LEUKOCYTE ESTERASE UR QL STRIP.AUTO: NEGATIVE
LYMPHOCYTES # BLD: 0.3 K/UL (ref 0.8–3.5)
LYMPHOCYTES NFR BLD: 7 % (ref 12–49)
MAGNESIUM SERPL-MCNC: 2.5 MG/DL (ref 1.6–2.4)
MANUAL DIFFERENTIAL PERFORMED BLD QL: ABNORMAL
MCH RBC QN AUTO: 30 PG (ref 26–34)
MCH RBC QN AUTO: 30.1 PG (ref 26–34)
MCHC RBC AUTO-ENTMCNC: 32.4 G/DL (ref 30–36.5)
MCHC RBC AUTO-ENTMCNC: 32.8 G/DL (ref 30–36.5)
MCV RBC AUTO: 91.8 FL (ref 80–99)
MCV RBC AUTO: 92.6 FL (ref 80–99)
METAMYELOCYTES NFR BLD MANUAL: 1 %
MONOCYTES # BLD: 0.1 K/UL (ref 0–1)
MONOCYTES NFR BLD: 3 % (ref 5–13)
MYELOCYTES NFR BLD MANUAL: 0 %
NEUTS BAND NFR BLD MANUAL: 13 % (ref 0–6)
NEUTS SEG # BLD: 4.3 K/UL (ref 1.8–8)
NEUTS SEG NFR BLD: 76 % (ref 32–75)
NITRITE UR QL STRIP.AUTO: NEGATIVE
NRBC # BLD: 0.08 K/UL (ref 0–0.01)
NRBC # BLD: 0.18 K/UL (ref 0–0.01)
NRBC BLD-RTO: 1.8 PER 100 WBC
NRBC BLD-RTO: 2.8 PER 100 WBC
O2/TOTAL GAS SETTING VFR VENT: 1 %
OTHER CELLS NFR BLD MANUAL: 0 %
P-R INTERVAL, ECG05: 160 MS
PCO2 BLD: 51 MMHG (ref 35–45)
PH BLD: 7.2 [PH] (ref 7.35–7.45)
PH UR STRIP: 6 [PH] (ref 5–8)
PHOSPHATE SERPL-MCNC: 3.6 MG/DL (ref 2.6–4.7)
PLATELET # BLD AUTO: 66 K/UL (ref 150–400)
PLATELET # BLD AUTO: 70 K/UL (ref 150–400)
PLATELET COMMENTS,PCOM: ABNORMAL
PO2 BLD: 96 MMHG (ref 80–100)
POTASSIUM SERPL-SCNC: 3.2 MMOL/L (ref 3.5–5.1)
POTASSIUM SERPL-SCNC: 3.3 MMOL/L (ref 3.5–5.1)
PROMYELOCYTES NFR BLD MANUAL: 0 %
PROT SERPL-MCNC: 6.2 G/DL (ref 6.4–8.2)
PROT SERPL-MCNC: 6.5 G/DL (ref 6.4–8.2)
PROT UR STRIP-MCNC: 100 MG/DL
Q-T INTERVAL, ECG07: 364 MS
QRS DURATION, ECG06: 88 MS
QTC CALCULATION (BEZET), ECG08: 471 MS
RBC # BLD AUTO: 3.16 M/UL (ref 4.1–5.7)
RBC # BLD AUTO: 3.23 M/UL (ref 4.1–5.7)
RBC #/AREA URNS HPF: ABNORMAL /HPF (ref 0–5)
RBC MORPH BLD: ABNORMAL
RBC MORPH BLD: ABNORMAL
SAO2 % BLD: 95 % (ref 92–97)
SERVICE CMNT-IMP: ABNORMAL
SODIUM SERPL-SCNC: 144 MMOL/L (ref 136–145)
SODIUM SERPL-SCNC: 149 MMOL/L (ref 136–145)
SP GR UR REFRACTOMETRY: 1.02 (ref 1–1.03)
SPECIMEN TYPE: ABNORMAL
TROPONIN I SERPL-MCNC: 0.15 NG/ML
UA: UC IF INDICATED,UAUC: ABNORMAL
UROBILINOGEN UR QL STRIP.AUTO: 0.2 EU/DL (ref 0.2–1)
VENTRICULAR RATE, ECG03: 101 BPM
WBC # BLD AUTO: 4.8 K/UL (ref 4.1–11.1)
WBC # BLD AUTO: 8 K/UL (ref 4.1–11.1)
WBC MORPH BLD: ABNORMAL
WBC NRBC COR # BLD: ABNORMAL 10*3/UL
WBC NRBC COR # BLD: ABNORMAL 10*3/UL
WBC URNS QL MICRO: ABNORMAL /HPF (ref 0–4)

## 2017-10-09 PROCEDURE — 80053 COMPREHEN METABOLIC PANEL: CPT | Performed by: INTERNAL MEDICINE

## 2017-10-09 PROCEDURE — 77030018836 HC SOL IRR NACL ICUM -A

## 2017-10-09 PROCEDURE — 77010033678 HC OXYGEN DAILY

## 2017-10-09 PROCEDURE — 94664 DEMO&/EVAL PT USE INHALER: CPT

## 2017-10-09 PROCEDURE — 74011000250 HC RX REV CODE- 250: Performed by: INTERNAL MEDICINE

## 2017-10-09 PROCEDURE — 74011250637 HC RX REV CODE- 250/637: Performed by: INTERNAL MEDICINE

## 2017-10-09 PROCEDURE — 93005 ELECTROCARDIOGRAM TRACING: CPT

## 2017-10-09 PROCEDURE — 84100 ASSAY OF PHOSPHORUS: CPT | Performed by: HOSPITALIST

## 2017-10-09 PROCEDURE — 80053 COMPREHEN METABOLIC PANEL: CPT | Performed by: HOSPITALIST

## 2017-10-09 PROCEDURE — 74011000258 HC RX REV CODE- 258: Performed by: INTERNAL MEDICINE

## 2017-10-09 PROCEDURE — 93306 TTE W/DOPPLER COMPLETE: CPT

## 2017-10-09 PROCEDURE — 82962 GLUCOSE BLOOD TEST: CPT

## 2017-10-09 PROCEDURE — 83605 ASSAY OF LACTIC ACID: CPT | Performed by: INTERNAL MEDICINE

## 2017-10-09 PROCEDURE — 94002 VENT MGMT INPAT INIT DAY: CPT

## 2017-10-09 PROCEDURE — 71010 XR CHEST PORT: CPT

## 2017-10-09 PROCEDURE — 83735 ASSAY OF MAGNESIUM: CPT | Performed by: HOSPITALIST

## 2017-10-09 PROCEDURE — 77030029684 HC NEB SM VOL KT MONA -A

## 2017-10-09 PROCEDURE — 81001 URINALYSIS AUTO W/SCOPE: CPT | Performed by: INTERNAL MEDICINE

## 2017-10-09 PROCEDURE — 36600 WITHDRAWAL OF ARTERIAL BLOOD: CPT

## 2017-10-09 PROCEDURE — 0BH17EZ INSERTION OF ENDOTRACHEAL AIRWAY INTO TRACHEA, VIA NATURAL OR ARTIFICIAL OPENING: ICD-10-PCS | Performed by: ANESTHESIOLOGY

## 2017-10-09 PROCEDURE — 82803 BLOOD GASES ANY COMBINATION: CPT

## 2017-10-09 PROCEDURE — 85379 FIBRIN DEGRADATION QUANT: CPT | Performed by: HOSPITALIST

## 2017-10-09 PROCEDURE — 36415 COLL VENOUS BLD VENIPUNCTURE: CPT | Performed by: INTERNAL MEDICINE

## 2017-10-09 PROCEDURE — 85027 COMPLETE CBC AUTOMATED: CPT | Performed by: INTERNAL MEDICINE

## 2017-10-09 PROCEDURE — 74011250636 HC RX REV CODE- 250/636: Performed by: INTERNAL MEDICINE

## 2017-10-09 PROCEDURE — 5A1935Z RESPIRATORY VENTILATION, LESS THAN 24 CONSECUTIVE HOURS: ICD-10-PCS | Performed by: ANESTHESIOLOGY

## 2017-10-09 PROCEDURE — 74011636637 HC RX REV CODE- 636/637: Performed by: INTERNAL MEDICINE

## 2017-10-09 PROCEDURE — 94640 AIRWAY INHALATION TREATMENT: CPT

## 2017-10-09 PROCEDURE — 65610000006 HC RM INTENSIVE CARE

## 2017-10-09 PROCEDURE — 74011250636 HC RX REV CODE- 250/636: Performed by: HOSPITALIST

## 2017-10-09 PROCEDURE — 84484 ASSAY OF TROPONIN QUANT: CPT | Performed by: HOSPITALIST

## 2017-10-09 PROCEDURE — 82550 ASSAY OF CK (CPK): CPT | Performed by: INTERNAL MEDICINE

## 2017-10-09 PROCEDURE — 74000 XR ABD PORT  1 V: CPT

## 2017-10-09 PROCEDURE — 74011250636 HC RX REV CODE- 250/636

## 2017-10-09 PROCEDURE — 87086 URINE CULTURE/COLONY COUNT: CPT | Performed by: INTERNAL MEDICINE

## 2017-10-09 RX ORDER — NALOXONE HYDROCHLORIDE 0.4 MG/ML
0.2 INJECTION, SOLUTION INTRAMUSCULAR; INTRAVENOUS; SUBCUTANEOUS ONCE
Status: DISCONTINUED | OUTPATIENT
Start: 2017-10-09 | End: 2017-10-09

## 2017-10-09 RX ORDER — FENTANYL CITRATE 50 UG/ML
25 INJECTION, SOLUTION INTRAMUSCULAR; INTRAVENOUS
Status: DISCONTINUED | OUTPATIENT
Start: 2017-10-09 | End: 2017-10-13 | Stop reason: HOSPADM

## 2017-10-09 RX ORDER — POTASSIUM CHLORIDE 20MEQ/15ML
40 LIQUID (ML) ORAL
Status: COMPLETED | OUTPATIENT
Start: 2017-10-09 | End: 2017-10-09

## 2017-10-09 RX ORDER — SODIUM CHLORIDE 9 MG/ML
100 INJECTION, SOLUTION INTRAVENOUS CONTINUOUS
Status: DISCONTINUED | OUTPATIENT
Start: 2017-10-09 | End: 2017-10-10

## 2017-10-09 RX ORDER — PROPOFOL 10 MG/ML
0-50 VIAL (ML) INTRAVENOUS
Status: DISCONTINUED | OUTPATIENT
Start: 2017-10-09 | End: 2017-10-10

## 2017-10-09 RX ORDER — LEVOFLOXACIN 5 MG/ML
250 INJECTION, SOLUTION INTRAVENOUS EVERY 24 HOURS
Status: DISCONTINUED | OUTPATIENT
Start: 2017-10-09 | End: 2017-10-13 | Stop reason: HOSPADM

## 2017-10-09 RX ADMIN — CARVEDILOL 12.5 MG: 12.5 TABLET, FILM COATED ORAL at 18:05

## 2017-10-09 RX ADMIN — IPRATROPIUM BROMIDE 0.5 MG: 0.5 SOLUTION RESPIRATORY (INHALATION) at 03:02

## 2017-10-09 RX ADMIN — DIPHENHYDRAMINE HYDROCHLORIDE 5 ML: 12.5 SOLUTION ORAL at 08:21

## 2017-10-09 RX ADMIN — SODIUM CHLORIDE 500 ML: 900 INJECTION, SOLUTION INTRAVENOUS at 10:09

## 2017-10-09 RX ADMIN — SODIUM CHLORIDE 100 ML/HR: 900 INJECTION, SOLUTION INTRAVENOUS at 13:05

## 2017-10-09 RX ADMIN — CEFEPIME 2 G: 2 INJECTION, POWDER, FOR SOLUTION INTRAVENOUS at 00:16

## 2017-10-09 RX ADMIN — TAMSULOSIN HYDROCHLORIDE 0.4 MG: 0.4 CAPSULE ORAL at 08:21

## 2017-10-09 RX ADMIN — DIPHENHYDRAMINE HYDROCHLORIDE 5 ML: 12.5 SOLUTION ORAL at 18:06

## 2017-10-09 RX ADMIN — ISOSORBIDE MONONITRATE 20 MG: 20 TABLET ORAL at 09:00

## 2017-10-09 RX ADMIN — LEVOFLOXACIN 250 MG: 5 INJECTION, SOLUTION INTRAVENOUS at 16:07

## 2017-10-09 RX ADMIN — ENOXAPARIN SODIUM 40 MG: 100 INJECTION SUBCUTANEOUS at 08:20

## 2017-10-09 RX ADMIN — HUMAN INSULIN 9 UNITS: 100 INJECTION, SOLUTION SUBCUTANEOUS at 09:10

## 2017-10-09 RX ADMIN — IPRATROPIUM BROMIDE 0.5 MG: 0.5 SOLUTION RESPIRATORY (INHALATION) at 07:26

## 2017-10-09 RX ADMIN — DIPHENHYDRAMINE HYDROCHLORIDE 5 ML: 12.5 SOLUTION ORAL at 22:27

## 2017-10-09 RX ADMIN — CEFEPIME 2 G: 2 INJECTION, POWDER, FOR SOLUTION INTRAVENOUS at 11:58

## 2017-10-09 RX ADMIN — SODIUM CHLORIDE 1000 ML: 900 INJECTION, SOLUTION INTRAVENOUS at 11:45

## 2017-10-09 RX ADMIN — HUMAN INSULIN 12 UNITS: 100 INJECTION, SOLUTION SUBCUTANEOUS at 11:57

## 2017-10-09 RX ADMIN — FENTANYL CITRATE 25 MCG: 50 INJECTION, SOLUTION INTRAMUSCULAR; INTRAVENOUS at 19:12

## 2017-10-09 RX ADMIN — MORPHINE SULFATE 2 MG: 2 INJECTION, SOLUTION INTRAMUSCULAR; INTRAVENOUS at 01:52

## 2017-10-09 RX ADMIN — POTASSIUM CHLORIDE 40 MEQ: 20 SOLUTION ORAL at 18:05

## 2017-10-09 RX ADMIN — CEFEPIME 2 G: 2 INJECTION, POWDER, FOR SOLUTION INTRAVENOUS at 23:58

## 2017-10-09 RX ADMIN — MORPHINE SULFATE 2 MG: 2 INJECTION, SOLUTION INTRAMUSCULAR; INTRAVENOUS at 08:20

## 2017-10-09 RX ADMIN — CARVEDILOL 12.5 MG: 12.5 TABLET, FILM COATED ORAL at 08:21

## 2017-10-09 RX ADMIN — IPRATROPIUM BROMIDE 0.5 MG: 0.5 SOLUTION RESPIRATORY (INHALATION) at 13:36

## 2017-10-09 RX ADMIN — PROPOFOL 20 MCG/KG/MIN: 10 INJECTION, EMULSION INTRAVENOUS at 10:18

## 2017-10-09 RX ADMIN — IPRATROPIUM BROMIDE 0.5 MG: 0.5 SOLUTION RESPIRATORY (INHALATION) at 20:05

## 2017-10-09 RX ADMIN — DEXTROSE MONOHYDRATE 125 ML/HR: 5 INJECTION, SOLUTION INTRAVENOUS at 06:13

## 2017-10-09 NOTE — CDMP QUERY
Please clarify if this patient is being treated/managed for:    =>Possible Sepsis POA in the setting of fever/tachycardia in neutropenic patient requiring maxipime and vancomycin  =>Other Explanation of clinical findings  =>Unable to Determine (no explanation of clinical findings)    The medical record reflects the following clinical findings, treatment, and risk factors:    Risk Factors: mucositis  Clinical Indicators: fever, tachycardia, neutropenic patient   Treatment: maxipime and vancomycin    Please clarify and document your clinical opinion in the progress notes and discharge summary including the definitive and/or presumptive diagnosis, (suspected or probable), related to the above clinical findings. Please include clinical findings supporting your diagnosis.     Thank you,         Yanique Boone [address] [place] [address]

## 2017-10-09 NOTE — PROGRESS NOTES
TRANSFER - OUT REPORT:    Verbal report given to Julius Alpers (name) on Pj Dupont  being transferred to ICU(unit) for urgent transfer       Report consisted of patients Situation, Background, Assessment and   Recommendations(SBAR). Information from the following report(s) SBAR, Kardex, Intake/Output, MAR and Recent Results was reviewed with the receiving nurse. Lines:   Peripheral IV 10/06/17 Left Forearm (Active)   Site Assessment Clean, dry, & intact 10/8/2017  8:00 PM   Phlebitis Assessment 0 10/8/2017  8:00 PM   Infiltration Assessment 0 10/8/2017  8:00 PM   Dressing Status Clean, dry, & intact 10/8/2017  8:00 PM   Dressing Type Tape;Transparent 10/8/2017  8:00 PM   Hub Color/Line Status Pink; Infusing;Patent 10/8/2017  8:00 PM   Action Taken Open ports on tubing capped 10/8/2017  8:00 PM   Alcohol Cap Used Yes 10/8/2017  8:00 PM        Opportunity for questions and clarification was provided.       Patient transported with:   Monitor  Patient-specific medications from Pharmacy  Registered Nurse

## 2017-10-09 NOTE — CONSULTS
[facility] [address]   [address] [facility]       Name:  Jose Alfredo Tanner   MR#:  [id number]   :  1940   Account #:  [de-identified]    Date of Consultation:  10/09/2017   Date of Adm:  10/06/2017       ADMITTING ATTENDING: Dr. Elena Tavarez. PRIMARY CARE PHYSICIAN: Dr. Tone Roberts    CONSULT DONE BY: Dr. Bryant Kingdom: \"Code blue. 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 Subsequently, the patient was discharged on   10/03/2017. At home the patient was having progressively worsening   difficulty in swallowing food, as well as his medications because of   mucositis and that is why the patient was readmitted on 10/06/2017. In   the ER, the patient was also noted to have fever and neutropenia and   that is why the patient was started on broad-spectrum antibiotics and   was being treated for that with cefepime. Per notes and per the RN,   the patient was still having zero p.o. intake because of his mucositis   and he was getting IV fluids regarding that. This morning, at around 8,   the patient was given IV morphine for his pain. In about 2 hours, code   blue was called because the patient's systolic blood pressure was in   60s and his pulse oximetry was in 80s and he was reportedly   unresponsive. Reportedly the patient's pulse was palpable but weak   and just after calling the code blue the patient was intubated and the   hospitalist team was called for the consult. By the time I saw the   patient, the patient was already intubated and was in the process of   going to the ICU. As far as I know from the nurse taking care of the patient, there was no   recent history of chest pain, unusual shortness of breath, cough. No fever today. No nausea or vomiting.  No changes in bowel   movements. REVIEW OF SYSTEMS: Quite limited but mentioned as above. PAST MEDICAL HISTORY   1. Hypertension. 2. Diabetes mellitus type 2, on metformin. 3. Coronary artery disease. 4. Reported congestive heart failure. 5. Chronic kidney disease, stage III. 6. Hypercholesterolemia. PAST SURGERIES: Colonoscopy. FAMILY HISTORY: Significant for colon cancer and breast cancer. SOCIAL HISTORY: Smoker. Does not drink. Non-IV drug abuser. ALLERGIES: NO KNOWN DRUG ALLERGIES. HOME MEDICATIONS    The patient is on the following medications at home:   1. Coreg 12.5 mg p.o. b.i.d.   2. Furosemide 40 mg p.o. twice a day. 3. Neurontin 100 mg p.o. t.i.d.   4. Hydrocodone 5 mg q.4h. p.r.n.   5. Isosorbide dinitrate 20 mg twice a day. 6. Magnesium oxide 400 mg twice a day. 7. Protonix 40 mg once a day. 8. KCl 30 mg p.o. daily. 9. Simvastatin 40 mg p.o. at bedtime. 10. Spiriva 2 puffs twice a day. PHYSICAL EXAMINATION   VITAL SIGNS: At the time when the patient was seen, the patient's   vitals were as follows: Blood pressure 169/82, pulse 100, patient on   ventilator. GENERAL: Not in acute distress. The patient already on ventilator. HEENT: Normocephalic, atraumatic. NECK: Supple. No JVD, no thyromegaly. RESPIRATORY: Bilateral posterior basilar coarse breath sounds,   otherwise good air entry. CARDIOVASCULAR: S1, S2 normal. No murmurs, rubs, or gallops. GASTROINTESTINAL: Bowel sounds present. Soft nondistended. NEUROLOGICAL/PSYCH: Hard to assess as the patient is already   intubated. LABORATORY AND RADIOLOGICAL RESULTS: WBC 4.8 which is   an improvement from his WBC that was done yesterday of 1.0. Hemoglobin 9.5, hematocrit 29, this is his baseline, platelet count of   66, seems to be his baseline. Sodium 149, potassium 3.3, chloride   119, bicarbonate 21, glucose 355, BUN 51, creatinine 1.97, albumin of   1.7, globulin 4.5. ALT 17, AST 11.      X-ray of the chest that was done today is still pending. EKG showed   sinus tachycardia with PVCs. No ST-T wave changes suggestive of   ischemia. ASSESSMENT AND PLAN: This is a 25-year-old male with past   medical history of advanced head and neck cancer being followed by   Dr. Alfreda Galicia. Also has a history of diabetes mellitus type 2, hypertension,   hypercholesterolemia who was admitted over here because of difficulty   in swallowing because of mucositis. This morning, code blue was   called. Currently, the patient is already intubated and going to ICU. We   will consult pulmonary regarding the same. Currently, I am not sure   why the patient went to cardiac arrest, most likely this is secondary to his   pain medication. I would hold the pain medication for now. The history   of head and neck cancer possibility could be for brain met or stroke as   well. CT scan of the head has already been ordered and that needs to   be followed. ABG has already been ordered as well that needs to be   seen. 1. Probable sepsis/neutropenic fever. The patient is being managed by   primary team, currently on cefepime. Cultures unremarkable. Management as per primary team.   2. Hypotensive likely secondary to prerenal. The patient was given   bolus of IV fluids. After that, the patient's blood pressure is much   better. We will continue IV fluids for now. 3. Acute kidney injury or chronic kidney disease, stage III, likely   secondary to dehydration from decreased p.o. intake. Management as   per Nephrology. 4. Hypoglycemia secondary to decreased p.o. intake. Currently on D5   fluids, continue that for now. 5. Hypernatremia. Again, likely secondary to dehydration. Management   as per Nephrology. 6. Head and neck cancer, status post cycle 1 chemotherapy. Management as per primary. I spent about 55 minutes in taking care of the patient.         Jihan Solano MD      PG / BA   D:  10/09/2017   11:07   T:  10/09/2017   11:55   Job #:  [id number]

## 2017-10-09 NOTE — PROGRESS NOTES
Spiritual Care Assessment/Progress Notes    [facility] [id number]  [id number]    1940  69 y.o.  male    Patient Telephone Number: [phone] (home)   Confucianism Affiliation: [facility]   Language: English   Extended Emergency Contact Information  Primary Emergency Contact: [address]  Mobile Phone: [phone]  Relation: Child   Patient Active Problem List    Diagnosis Date Noted    Tongue cancer ([facility] 10/06/2017    Mucositis 10/06/2017    Head and neck cancer ([facility] 09/28/2017    Floor of mouth squamous cell carcinoma ([facility] 06/07/2017        Date: 10/9/2017       Level of Confucianism/Spiritual Activity:  []         Involved in olayinka tradition/spiritual practice    []         Not involved in olayinka tradition/spiritual practice  []         Spiritually oriented    []         Claims no spiritual orientation    []         seeking spiritual identity  []         Feels alienated from Jehovah's witness practice/tradition  []         Feels angry about Jehovah's witness practice/tradition  [x]         Spirituality/Jehovah's witness tradition may be a resource for coping at this time.   []         Not able to assess due to medical condition    Services Provided Today:  []         crisis intervention    []         reading Scriptures  []         spiritual assessment    []         prayer  []         empathic listening/emotional support  []         rites and rituals (cite in comments)  []         life review     []         Jehovah's witness support  []         theological development   []         advocacy  []         ethical dialog     []         blessing  []         bereavement support    [x]         support to family  []         anticipatory grief support   [x]         help with AMD  []         spiritual guidance    []         meditation      Spiritual Care Needs  [x]         Emotional Support  []         Spiritual/Confucianism Care  []         Loss/Adjustment  []         Advocacy/Referral                /Ethics  []         No needs expressed at               this time  []         Other: (note in               comments)  [address]  [x]         Follow up visits with               pt/family  []         Provide materials  []         Schedule sacraments  []         Contact Community               Clergy  []         Follow up as needed  []         Other: (note in               comments)     Request by daughter Mak Camacho via Care Manager to assist with advance medical directive. Arrived to find large group of relatives in ICU waiting area and sister and daughter Shyla Llanos) of patient in ICU17. Was welcomed into room and present during withdrawal of vent that was inserted at time of code blue earlier today. Mak Camacho reported that Mr. Manuel Montes had repeatedly requested to complete an AMD naming her as MPOA. She asserted that her mother and other family are in agreement that she serve in that role. 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 Mak Camacho also shared that Mr. Manuel Montes does not desire further resuscitation or intubation. After consultation with Nurse Sena Olea, provided her with an explanation that completing an AMD would not be possible so soon after extubation. Recommended a repeat  visit tomorrow to make another attempt. [facility]'s Staff  (Sobia Gonzalez Patient Care Specialist)   Paging Service [phone]

## 2017-10-09 NOTE — CONSULTS
[facility]  Pulmonary, Critical Care, and Sleep Medicine    Initial Patient Consult    Name: Graciela Sierra MRN: [id number]   : 1940 Hospital: [facility]    Date: 10/9/2017        IMPRESSION:   · T4N1 SqCCA base of mouth s/p induction taxotere/carbo/5FU 10/2/17  · Acute resp failure- Hypercarbic- suspect due to overnarcosis. CXR clear gas exchange acceptable as are resp mechanics on vent ( no autoPEEP). · Obtundation- suspect cumulative dose effect of morphine. · Pancytopenia  · DM2- uncontrolled  · CAD  · HTN  · GERD  · FULL CODE      RECOMMENDATIONS:   · Isotonic fluid boluses ( suspect ECFV low)  · Bedside critical care US with nml LV anf RV fxn. RV not dilated. No DVT seen in femoral veins b/l  · Reduce propofol and reassess neuro exam. Will keep narcan on hold and give if neuro exam not improved. · Cefepime  · SUP  · DVT proph  · Pt is critically ill CCT EOP 30 min     Subjective: This patient has been seen and evaluated at the request of Dr. Edgardo Stahl for ICU mgmt. Patient is a 69 y.o. male   With h/o Head and neck cancer getting chemo at [facility]. Pt this afternoon was found obtunded. Has received total 16 mg morphine over last 48 hours. Received 2 mg morphine 2 hours prior to being found obtunded. Pt was intubated for resp arrest and sent to the ICU. Presently pt on vent. Moves non purposefully. Past Medical History:   Diagnosis Date    Cancer ([facility]     tongue with mets     Diabetes ([facility]     Hypercholesteremia     Hypertension       History reviewed. No pertinent surgical history. Prior to Admission medications    Medication Sig Start Date End Date Taking? Authorizing Provider   potassium chloride SR (K-TAB) 20 mEq tablet Take 20 mEq by mouth daily. Yes Historical Provider   tamsulosin (FLOMAX) 0.4 mg capsule Take 0.4 mg by mouth daily.    Yes Historical Provider   oxyCODONE-acetaminophen (PERCOCET) 5-325 mg per tablet Take 1 Tab by mouth every four (4) hours as needed for Pain. Yes Historical Provider   nicotine (NICODERM CQ) 21 mg/24 hr 1 Patch by TransDERmal route every twenty-four (24) hours. Yes Historical Provider   albuterol (PROVENTIL VENTOLIN) 2.5 mg /3 mL (0.083 %) nebulizer solution 2.5 mg by Nebulization route every four (4) hours as needed for Wheezing. Yes Historical Provider   Magic Mouthwash, no sucralfate, mwsh oral suspension (compounded) Take 5 mL by mouth four (4) times daily. Swish and swallow   Yes Historical Provider   isosorbide mononitrate (ISMO, MONOKET) 20 mg tablet Take 20 mg by mouth two (2) times a day. Yes Historical Provider   gabapentin (NEURONTIN) 100 mg capsule Take 100 mg by mouth three (3) times daily. Yes Historical Provider   simvastatin (ZOCOR) 40 mg tablet Take 40 mg by mouth nightly. Yes Historical Provider   carvedilol (COREG) 12.5 mg tablet Take 12.5 mg by mouth two (2) times daily (with meals). Yes Historical Provider   magnesium oxide (MAG-OX) 400 mg tablet Take 400 mg by mouth daily. Yes Historical Provider   pantoprazole (PROTONIX) 40 mg tablet Take 40 mg by mouth daily. Yes Historical Provider   glimepiride (AMARYL) 4 mg tablet Take 4 mg by mouth two (2) times a day. One with breakfast, one with dinner   Yes Historical Provider   furosemide (LASIX) 40 mg tablet Take 40 mg by mouth two (2) times a day. One tablet in the morning, one tablet at noon   Yes Historical Provider   tiotropium (SPIRIVA WITH HANDIHALER) 18 mcg inhalation capsule Take 1 Cap by inhalation daily. Yes Historical Provider     No Known Allergies   Social History   Substance Use Topics    Smoking status: Former Smoker     Quit date: 9/27/2017    Smokeless tobacco: Never Used    Alcohol use No      Comment: stopped drinking years ago       History reviewed. No pertinent family history.      Current Facility-Administered Medications   Medication Dose Route Frequency    PHENYLephrine (NEOSYNEPHRINE) 30,000 mcg in 0.9% sodium chloride 250 mL infusion   mcg/min IntraVENous TITRATE    propofol (DIPRIVAN) infusion  0-50 mcg/kg/min IntraVENous TITRATE    naloxone (NARCAN) injection 0.2 mg  0.2 mg IntraVENous ONCE    dextrose 5% infusion  125 mL/hr IntraVENous CONTINUOUS    carvedilol (COREG) tablet 12.5 mg  12.5 mg Oral BID WITH MEALS    isosorbide mononitrate (ISMO, MONOKET) tablet 20 mg  20 mg Oral BID    magic mouthwash cpd (without sucralfate)  5 mL Oral QID    nicotine (NICODERM CQ) 21 mg/24 hr patch 1 Patch  1 Patch TransDERmal Q24H    tamsulosin (FLOMAX) capsule 0.4 mg  0.4 mg Oral DAILY    ipratropium (ATROVENT) 0.02 % nebulizer solution 0.5 mg  0.5 mg Nebulization Q6H RT    enoxaparin (LOVENOX) injection 40 mg  40 mg SubCUTAneous DAILY    insulin regular (NOVOLIN R, HUMULIN R) injection   SubCUTAneous AC&HS    cloNIDine (CATAPRES) 0.2 mg/24 hr patch 1 Patch  1 Patch TransDERmal ONCE    cefepime (MAXIPIME) 2 g in 0.9% sodium chloride (MBP/ADV) 100 mL  2 g IntraVENous Q12H       Review of Systems:  Review of systems not obtained due to patient factors. Objective:   Vital Signs:    Visit Vitals    /82    Pulse 100    Temp 98.1 °F (36.7 °C)    Resp 18    Ht 5' 9\" (1.753 m)    Wt 83.9 kg (185 lb)    SpO2 (!) 87%    BMI 27.32 kg/m2       O2 Device: Room air       Temp (24hrs), Av.3 °F (36.8 °C), Min:97.6 °F (36.4 °C), Max:99.1 °F (37.3 °C)       Intake/Output:   Last shift:      10/09 0701 - 10/09 1900  In: -   Out: 300 [Urine:300]  Last 3 shifts: 10/07 1901 - 10/09 0700  In: 0   Out: 2650 [Urine:2650]    Intake/Output Summary (Last 24 hours) at 10/09/17 1127  Last data filed at 10/09/17 1016   Gross per 24 hour   Intake                0 ml   Output             2125 ml   Net            -2125 ml      Physical Exam:   General:  Sedated on vent. Head:  Normocephalic, without obvious abnormality, atraumatic. Eyes:  Conjunctivae/corneas clear. Nose: Nares normal. Septum midline.  Mucosa normal. No drainage or sinus tenderness. Throat: Lips, mucosa, and tongue normal. Teeth and gums normal.   Neck: Supple, symmetrical, trachea midline. Lungs:   Clear to auscultation bilaterally. Heart:  Regular rate and rhythm, S1, S2 normal, no murmur, click, rub or gallop. Abdomen:   Soft, non-tender. Bowel sounds normal. No masses,  No organomegaly. Extremities: Extremities normal, atraumatic, no cyanosis or edema. Pulses: 2+ and symmetric all extremities. Skin: Skin color, texture, turgor normal. No rashes or lesions             Data review:     Recent Results (from the past 24 hour(s))   GLUCOSE, POC    Collection Time: 10/08/17  4:38 PM   Result Value Ref Range    Glucose (POC) 232 (H) 65 - 100 mg/dL    Performed by Kaleigh Campos    GLUCOSE, POC    Collection Time: 10/08/17  9:26 PM   Result Value Ref Range    Glucose (POC) 247 (H) 65 - 100 mg/dL    Performed by [facility]    CBC WITH MANUAL DIFF    Collection Time: 10/09/17  2:32 AM   Result Value Ref Range    WBC 4.8 4.1 - 11.1 K/uL    RBC 3.16 (L) 4.10 - 5.70 M/uL    HGB 9.5 (L) 12.1 - 17.0 g/dL    HCT 29.0 (L) 36.6 - 50.3 %    MCV 91.8 80.0 - 99.0 FL    MCH 30.1 26.0 - 34.0 PG    MCHC 32.8 30.0 - 36.5 g/dL    RDW 13.3 11.5 - 14.5 %    PLATELET 66 (L) 696 - 400 K/uL    NEUTROPHILS 76 (H) 32 - 75 %    BAND NEUTROPHILS 13 (H) 0 - 6 %    LYMPHOCYTES 7 (L) 12 - 49 %    MONOCYTES 3 (L) 5 - 13 %    EOSINOPHILS 0 0 - 7 %    BASOPHILS 0 0 - 1 %    METAMYELOCYTES 1 (H) 0 %    MYELOCYTES 0 0 %    PROMYELOCYTES 0 0 %    BLASTS 0 0 %    OTHER CELL 0 0      ABS. NEUTROPHILS 4.3 1.8 - 8.0 K/UL    ABS. LYMPHOCYTES 0.3 (L) 0.8 - 3.5 K/UL    ABS. MONOCYTES 0.1 0.0 - 1.0 K/UL    ABS. EOSINOPHILS 0.0 0.0 - 0.4 K/UL    ABS.  BASOPHILS 0.0 0.0 - 0.1 K/UL    DF MANUAL      PLATELET COMMENTS LARGE PLATELETS      RBC COMMENTS ARMANDO CELLS  PRESENT        RBC COMMENTS ANISOCYTOSIS  1+        WBC COMMENTS DOHLE BODIES      NRBC 1.8 (H) 0  WBC    ABSOLUTE NRBC 0.08 (H) 0.00 - 0.01 K/uL    WBC CORRECTED FOR NR ADJUSTED FOR NUCLEATED RBC'S      DIFFERENTIAL MANUAL DIFFERENTIAL ORDERED     METABOLIC PANEL, COMPREHENSIVE    Collection Time: 10/09/17  2:32 AM   Result Value Ref Range    Sodium 149 (H) 136 - 145 mmol/L    Potassium 3.3 (L) 3.5 - 5.1 mmol/L    Chloride 119 (H) 97 - 108 mmol/L    CO2 21 21 - 32 mmol/L    Anion gap 9 5 - 15 mmol/L    Glucose 355 (H) 65 - 100 mg/dL    BUN 51 (H) 6 - 20 MG/DL    Creatinine 1.97 (H) 0.70 - 1.30 MG/DL    BUN/Creatinine ratio 26 (H) 12 - 20      GFR est AA 40 (L) >60 ml/min/1.73m2    GFR est non-AA 33 (L) >60 ml/min/1.73m2    Calcium 8.3 (L) 8.5 - 10.1 MG/DL    Bilirubin, total 0.5 0.2 - 1.0 MG/DL    ALT (SGPT) 17 12 - 78 U/L    AST (SGOT) 11 (L) 15 - 37 U/L    Alk.  phosphatase 52 45 - 117 U/L    Protein, total 6.2 (L) 6.4 - 8.2 g/dL    Albumin 1.7 (L) 3.5 - 5.0 g/dL    Globulin 4.5 (H) 2.0 - 4.0 g/dL    A-G Ratio 0.4 (L) 1.1 - 2.2     GLUCOSE, POC    Collection Time: 10/09/17  7:14 AM   Result Value Ref Range    Glucose (POC) 386 (H) 65 - 100 mg/dL    Performed by Luciana Barraza    GLUCOSE, POC    Collection Time: 10/09/17  8:02 AM   Result Value Ref Range    Glucose (POC) 408 (H) 65 - 100 mg/dL    Performed by Mohini Parker, POC    Collection Time: 10/09/17  9:57 AM   Result Value Ref Range    Glucose (POC) 398 (H) 65 - 100 mg/dL    Performed by Lorne Schilder    EKG, 12 LEAD, INITIAL    Collection Time: 10/09/17 10:09 AM   Result Value Ref Range    Ventricular Rate 101 BPM    Atrial Rate 101 BPM    P-R Interval 160 ms    QRS Duration 88 ms    Q-T Interval 364 ms    QTC Calculation (Bezet) 471 ms    Calculated R Axis 68 degrees    Calculated T Axis -142 degrees    Diagnosis       Sinus tachycardia with premature supraventricular complexes  Nonspecific ST and T wave abnormality  No previous ECGs available     METABOLIC PANEL, COMPREHENSIVE    Collection Time: 10/09/17 10:29 AM   Result Value Ref Range    Sodium 144 136 - 145 mmol/L Potassium 3.2 (L) 3.5 - 5.1 mmol/L    Chloride 115 (H) 97 - 108 mmol/L    CO2 21 21 - 32 mmol/L    Anion gap 8 5 - 15 mmol/L    Glucose 414 (H) 65 - 100 mg/dL    BUN 47 (H) 6 - 20 MG/DL    Creatinine 1.98 (H) 0.70 - 1.30 MG/DL    BUN/Creatinine ratio 24 (H) 12 - 20      GFR est AA 40 (L) >60 ml/min/1.73m2    GFR est non-AA 33 (L) >60 ml/min/1.73m2    Calcium 8.1 (L) 8.5 - 10.1 MG/DL    Bilirubin, total 0.4 0.2 - 1.0 MG/DL    ALT (SGPT) 17 12 - 78 U/L    AST (SGOT) 14 (L) 15 - 37 U/L    Alk.  phosphatase 59 45 - 117 U/L    Protein, total 6.5 6.4 - 8.2 g/dL    Albumin 1.7 (L) 3.5 - 5.0 g/dL    Globulin 4.8 (H) 2.0 - 4.0 g/dL    A-G Ratio 0.4 (L) 1.1 - 2.2     PHOSPHORUS    Collection Time: 10/09/17 10:29 AM   Result Value Ref Range    Phosphorus 3.6 2.6 - 4.7 MG/DL   TROPONIN I    Collection Time: 10/09/17 10:29 AM   Result Value Ref Range    Troponin-I, Qt. 0.15 (H) <0.05 ng/mL   CK    Collection Time: 10/09/17 10:29 AM   Result Value Ref Range     39 - 308 U/L   NUCLEATED RBC    Collection Time: 10/09/17 10:29 AM   Result Value Ref Range    NRBC 2.8 (H) 0  WBC    ABSOLUTE NRBC 0.18 (H) 0.00 - 0.01 K/uL    WBC CORRECTED FOR NR ADJUSTED FOR NUCLEATED RBC'S     LACTIC ACID    Collection Time: 10/09/17 10:32 AM   Result Value Ref Range    Lactic acid 2.7 (HH) 0.4 - 2.0 MMOL/L   POC G3 - PUL    Collection Time: 10/09/17 10:32 AM   Result Value Ref Range    FIO2 (POC) 1.0 %    pH (POC) 7.196 (LL) 7.35 - 7.45      pCO2 (POC) 51.0 (H) 35.0 - 45.0 MMHG    pO2 (POC) 96 80 - 100 MMHG    HCO3 (POC) 19.8 (L) 22 - 26 MMOL/L    sO2 (POC) 95 92 - 97 %    Base deficit (POC) 8 mmol/L    Site LEFT RADIAL      Device: AMBU      Flow rate (POC) 15 L/M    Allens test (POC) YES      Specimen type (POC) ARTERIAL         Imaging:  I have personally reviewed the patients radiographs and have reviewed the reports:  PCXR mild PVC ETT Mendez Mtz MD

## 2017-10-09 NOTE — PROGRESS NOTES
TRANSFER - IN REPORT:    1100: Verbal and beside report received from LAURI Su(name) on Lisy Martinez  being received from Oncology (unit) for urgent transfer      Report consisted of patients Situation, Background, Assessment and   Recommendations(SBAR). Information from the following report(s) SBAR, Kardex, Intake/Output, MAR, Recent Results and Cardiac Rhythm NSR was reviewed with the receiving nurse. Opportunity for questions and clarification was provided. Assessment completed upon patients arrival to unit and care assumed. 1120:  Transferred patient to unit after respiratory arrest and intubation on another unit, attached to bedside monitor and vent, Dr. Kaila Bauman at bedside. Patient satting well with stable vital signs. 1230:  Dr. Richard Kurtz at bedside and updated on patient condition. Urine sent for analysis and culture per order. Patient following commands with propofol lightened. Dr. Kaila Bauman notified. 1300:   Propofol stopped for SBT. RT notified. 1330:  Patient vomiting a lot. NGT placed per Dr. Kaila Bauman. Will decompress then remove ET tube. 1400:  Spoke to Dr. Richard Kurtz regarding patient's UOP, urinalysis and velasquez. Dr. Richard Kurtz would like velasquez to remain if patient is extubated due to obstruction. Dr. Richard Kurtz would like the attending notified of UA results to see about antibiotic coverage. 1420:  Dr. Estill Cheadle' office contacted;  Dr. Tejas Bethea on today and paged. 1535:  Patient extubated by RT to nasal cannula. Patient tolerated procedure well and is satting well on 2 liters. 1600:  Reassessment complete. No new issues. Patient continues to do well extubated. 1930: Bedside and Verbal shift change report given to Kina Gonzalez RN (oncoming nurse) by Aliyah Rosario RN (offgoing nurse). Report included the following information SBAR, Kardex, Intake/Output, MAR, Recent Results and Cardiac Rhythm NSR.

## 2017-10-09 NOTE — PROGRESS NOTES
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 Jyoti Gray who was present at code blue  Pt has  head and neck cancer   Responded to code blue , pt had Low BP faint or absent pulse  BP 60/40's , tachy, O2 sat 80's  His BS was 300+, he was given morphin for pain 2 hrs back  Pt was unresponsive, code blue called  Pulses not palpable in UE later was palpable and low in the groin    NS bolus wide open started and pt was intubated for airway protection  Pt still having low BP started on abbey but BP was back with bolus so not started  Started on propofol for sedation and transferred to ICU     Labs boarded including CT   ABG ordered , EKG ordered   Will review ABG and D dimer  Pt cr 1.97 may not be able to get a CTA     D/D  1. NARCOTIC OVERDOSE  2. PE  3.  Stroke

## 2017-10-09 NOTE — DIABETES MGMT
DTC Progress Note    Recommendations/ Comments: Chart reviewed due to hyperglycemia. Blood sugars >300 and pt has required 15 units of correction insulin over the last 24 hours. Noted po intake is 0% but D5 is running. If appropriate, please consider:  1. Adding carb consistent to diet order  2. Changing correction scale to Humalog   3. Starting Lantus 20 units    Chart reviewed on [facility]. Patient is a 69 y.o. male with known diabetes on Amaryl 4 mg at home. A1c:   No results found for: HBA1C, HGBE8, JAA9FOGQ    Recent Glucose Results: Lab Results   Component Value Date/Time     (H) 10/09/2017 02:32 AM    GLUCPOC 398 (H) 10/09/2017 09:57 AM    GLUCPOC 408 (H) 10/09/2017 08:02 AM    GLUCPOC 386 (H) 10/09/2017 07:14 AM        Lab Results   Component Value Date/Time    Creatinine 1.97 10/09/2017 02:32 AM     Estimated Creatinine Clearance: 31.9 mL/min (based on Cr of 1.97). Active Orders   Diet    DIET MECHANICAL SOFT        PO intake: Patient Vitals for the past 72 hrs:   % Diet Eaten   10/08/17 1324 0 %   10/08/17 0825 0 %   10/07/17 1229 0 %   10/07/17 0933 0 %       Current hospital DM medication: correction scale Regular insulin, high sensitivity. Will continue to follow as needed.     Thank you  Domenico Morfin, CELIA, CDE

## 2017-10-09 NOTE — PROGRESS NOTES
NAME: Neftaly Li        :  1940        MRN:  [id number]        Assessment :    Plan:  --Head/Neck Cancer  RODOLFO-volume depletion/loop diuretics  Free water deficit  Mucositis  Pancytopenia  Hypernatremia  Resp. Arrest 10/9  Shock --Creatinine a little worse. Continue IVF. Watch sodium. Good UO S/P Reyes. Suspect obstruction. Potassium being repleted. U/A with reflex culture given cloudy urine. Subjective:     Chief Complaint:  Intubated. Review of Systems: UTO    Symptom Y/N Comments  Symptom Y/N Comments   Fever/Chills    Chest Pain     Poor Appetite    Edema     Cough    Abdominal Pain     Sputum    Joint Pain     SOB/JAIMES    Pruritis/Rash     Nausea/vomit    Tolerating PT/OT     Diarrhea    Tolerating Diet     Constipation    Other       Could not obtain due to:      Objective:     VITALS:   Last 24hrs VS reviewed since prior progress note.  Most recent are:  Visit Vitals    /65    Pulse 80    Temp 97.3 °F (36.3 °C)    Resp 20    Ht 5' 9\" (1.753 m)    Wt 83.9 kg (185 lb)    SpO2 100%    BMI 27.32 kg/m2       Intake/Output Summary (Last 24 hours) at 10/09/17 1319  Last data filed at 10/09/17 1300   Gross per 24 hour   Intake          1625.05 ml   Output             3375 ml   Net         -1749.95 ml      Telemetry Reviewed:     PHYSICAL EXAM:  General: Intubated  Bilateral rhonchi  abd soft  No edema      Lab Data Reviewed: (see below)    Medications Reviewed: (see below)    PMH/SH reviewed - no change compared to H&P  ________________________________________________________________________  Care Plan discussed with:  Patient     Family      RN     Care Manager                    Consultant:          Comments   >50% of visit spent in counseling and coordination of care       ________________________________________________________________________  Sheldon Rader MD     Procedures: see electronic medical records for all procedures/Xrays and details which  were not copied into this note but were reviewed prior to creation of Plan. LABS:  Recent Labs      10/09/17   0232  10/08/17   0429   WBC  4.8  1.0*   HGB  9.5*  9.7*   HCT  29.0*  29.9*   PLT  66*  70*     Recent Labs      10/09/17   1029  10/09/17   0232  10/08/17   0429  10/07/17   0450   NA  144  149*  153*  153*   K  3.2*  3.3*  3.7  3.3*   CL  115*  119*  122*  120*   CO2  21  21  21  22   BUN  47*  51*  44*  39*   CREA  1.98*  1.97*  2.11*  1.60*   GLU  414*  355*  249*  263*   CA  8.1*  8.3*  8.2*  8.3*   MG   --    --   2.1  1.9   PHOS  3.6   --   2.2*  1.6*     Recent Labs      10/09/17   1029  10/09/17   0232  10/08/17   0429   SGOT  14*  11*  9*   AP  59  52  54   TP  6.5  6.2*  6.7   ALB  1.7*  1.7*  2.0*   GLOB  4.8*  4.5*  4.7*     No results for input(s): INR, PTP, APTT in the last 72 hours. No lab exists for component: INREXT   No results for input(s): FE, TIBC, PSAT, FERR in the last 72 hours. Lab Results   Component Value Date/Time    Folate 13.8 09/30/2017 03:43 AM      No results for input(s): PH, PCO2, PO2 in the last 72 hours.   Recent Labs      10/09/17   1029   CPK  101     No components found for: [place]  Lab Results   Component Value Date/Time    Color YELLOW/STRAW 10/09/2017 12:13 PM    Appearance TURBID 10/09/2017 12:13 PM    Specific gravity 1.025 10/09/2017 12:13 PM    pH (UA) 6.0 10/09/2017 12:13 PM    Protein 100 10/09/2017 12:13 PM    Glucose 500 10/09/2017 12:13 PM    Ketone NEGATIVE  10/09/2017 12:13 PM    Bilirubin NEGATIVE  10/09/2017 12:13 PM    Urobilinogen 0.2 10/09/2017 12:13 PM    Nitrites NEGATIVE  10/09/2017 12:13 PM    Leukocyte Esterase NEGATIVE  10/09/2017 12:13 PM    Epithelial cells MODERATE 10/09/2017 12:13 PM    Bacteria 3+ 10/09/2017 12:13 PM    WBC 5-10 10/09/2017 12:13 PM    RBC 5-10 10/09/2017 12:13 PM       MEDICATIONS:  Current Facility-Administered Medications   Medication Dose Route Frequency    PHENYLephrine (NEOSYNEPHRINE) 30,000 mcg in 0.9% sodium chloride 250 mL infusion   mcg/min IntraVENous TITRATE    propofol (DIPRIVAN) infusion  0-50 mcg/kg/min IntraVENous TITRATE    0.9% sodium chloride infusion  100 mL/hr IntraVENous CONTINUOUS    fentaNYL citrate (PF) injection 25 mcg  25 mcg IntraVENous Q4H PRN    acetaminophen (TYLENOL) tablet 500 mg  500 mg Oral Q6H PRN    albuterol (PROVENTIL VENTOLIN) nebulizer solution 2.5 mg  2.5 mg Nebulization Q4H PRN    carvedilol (COREG) tablet 12.5 mg  12.5 mg Oral BID WITH MEALS    isosorbide mononitrate (ISMO, MONOKET) tablet 20 mg  20 mg Oral BID    magic mouthwash cpd (without sucralfate)  5 mL Oral QID    nicotine (NICODERM CQ) 21 mg/24 hr patch 1 Patch  1 Patch TransDERmal Q24H    oxyCODONE-acetaminophen (PERCOCET) 5-325 mg per tablet 1 Tab  1 Tab Oral Q4H PRN    tamsulosin (FLOMAX) capsule 0.4 mg  0.4 mg Oral DAILY    ipratropium (ATROVENT) 0.02 % nebulizer solution 0.5 mg  0.5 mg Nebulization Q6H RT    enoxaparin (LOVENOX) injection 40 mg  40 mg SubCUTAneous DAILY    ondansetron (ZOFRAN) injection 4 mg  4 mg IntraVENous Q6H PRN    zolpidem (AMBIEN) tablet 5 mg  5 mg Oral QHS PRN    insulin regular (NOVOLIN R, HUMULIN R) injection   SubCUTAneous AC&HS    cloNIDine (CATAPRES) 0.2 mg/24 hr patch 1 Patch  1 Patch TransDERmal ONCE    glucose chewable tablet 16 g  4 Tab Oral PRN    dextrose (D50W) injection syrg 12.5-25 g  12.5-25 g IntraVENous PRN    glucagon (GLUCAGEN) injection 1 mg  1 mg IntraMUSCular PRN    cefepime (MAXIPIME) 2 g in 0.9% sodium chloride (MBP/ADV) 100 mL  2 g IntraVENous Q12H

## 2017-10-09 NOTE — PROGRESS NOTES
Spiritual Care Assessment/Progress Notes    Julio Cesar Hope [id number]  [id number]    1940  69 y.o.  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   [x]         Not able to assess due to medical condition    Services Provided Today:  []         crisis intervention    []         reading Scriptures  []         spiritual assessment    []         prayer  []         empathic listening/emotional support  []         rites and rituals (cite in comments)  []         life review     []         Mosque support  []         theological development   []         advocacy  []         ethical dialog     []         blessing  []         bereavement support    []         support to family  []         anticipatory grief support   []         help with AMD  []         spiritual guidance    []         meditation      Spiritual Care Needs  []         Emotional Support  []         Spiritual/Rastafarian Care  []         Loss/Adjustment  []         Advocacy/Referral                /Ethics  []         No needs expressed at               this time  []         Other: (note in               comments)  Spiritual Care Plan  []         Follow up visits with               pt/family  []         Provide materials  []         Schedule sacraments  []         Contact Community               Clergy  [x]         Follow up as needed  []         Other: (note in               comments)     Responded to a RRT which became a Code Blue. No family present and no Spiritual Care needs at this time. Chaplains will follow as needed/able.   Chaplain Viry, MDiv, MS, [facility]  [phone]

## 2017-10-09 NOTE — ACP (ADVANCE CARE PLANNING)
Request by daughter Russ John via Care Manager to assist with advance medical directive. Arrived to find large group of relatives in ICU waiting area and sister and daughter Harvey Arias) of patient in ICU17. Was welcomed into room and present during withdrawal of vent that was inserted at time of code blue earlier today. Russ John reported that Mr. Josselyn Colvin had repeatedly requested to complete an AMD naming her as MPOA. She asserted that her mother and other family are in agreement that she serve in that role. 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 Russ John also shared that Mr. Josselyn Colvin does not desire further resuscitation or intubation. After consultation with Nurse Ave Syed, provided her with an explanation that completing an AMD would not be possible so soon after extubation. Recommended a repeat  visit tomorrow to make another attempt. [address] [facility]'s Staff  ([place] [address] Patient Care Specialist)   Paging Service [phone])

## 2017-10-09 NOTE — PROGRESS NOTES
Bedside and Verbal shift change report given to LAURI Brambila (oncoming nurse) by Lela Thompson RN (offgoing nurse). Report included the following information SBAR, Kardex, Intake/Output, MAR, Accordion and Recent Results.

## 2017-10-09 NOTE — PROGRESS NOTES
Bedside shift change report given to Eliana Ogden, RN (oncoming nurse) by Stevie Powell RN (offgoing nurse). Report included the following information SBAR, Kardex and MAR.     [phone]- Paged MD on call for Insulin order. Anna Marie Cota MD for Insulin order. Per Dr. Elena Tavarez, give 9Units of sliding scale coverage. 1120- Critical Troponin 0.15 reported to ICU RN.

## 2017-10-09 NOTE — PROGRESS NOTES
Care Management: nurse said volunteer out in waiting area has family that wants to speak with a . Writer  went into room and grand daughter was there and had no questions. [address] daughter said \"My Cecile Mohs has questions! \" spoke with Stephie Browning and she wants to be MPOA at patient request prior to patients intubation. Patient being extubated later today and [facility] Care ( [phone] )  Is coming up now to do Advanced Directives with Stephie Browning. To follow transition of care.   Aquiles Mendenhall RN  BSN CM

## 2017-10-09 NOTE — PROGRESS NOTES
Hematology-Oncology Progress Note    Chu Raymundo  1940  [id number]  10/9/2017    Follow-up for: head and neck cancer     [x]        Chart notes since last visit reviewed   [x]        Medications reviewed for allergies and interactions       Case discussed with the following:         []                            [x]        Nursing Staff                                                                         []        Pathologist                                                                        [x]        FAMILY                                                                        hospitalist    Subjective:     Spoke with patient who complains of: pt.  Is intubated,     Objective:   Patient Vitals for the past 24 hrs:   BP Temp Pulse Resp SpO2   10/09/17 1029 - - - - (!) 87 %   10/09/17 1024 - - - - (!) 88 %   10/09/17 1019 169/82 - 100 - -   10/09/17 1009 173/84 - - - -   10/09/17 1001 (!) 64/54 - - - -   10/09/17 0957 - - (!) 155 - -   10/09/17 0816 141/82 98.1 °F (36.7 °C) 82 18 97 %   10/09/17 0302 - - - - 91 %   10/09/17 0217 142/73 99.1 °F (37.3 °C) 78 16 91 %   10/08/17 2034 - - - - 94 %   10/08/17 2010 138/69 98.5 °F (36.9 °C) 83 16 94 %   10/08/17 1434 167/80 97.6 °F (36.4 °C) 96 16 98 %   10/08/17 1324 - - - - 97 %   10/08/17 1119 156/82 97 °F (36.1 °C) 90 16 98 %       REVIEW OF SYSTEMS:    Not obtainable    Constitutional:  Patient looks  [x]        Sick  []        Frail  []        Better                                                 []        Depressed    HEENT:  [x]   NC                         []   AT               []    ALOPECIA         ET tube in place  Eyes: [x]   Normal               []    Icteric  Oropharynx: []    Normal                  []  Thrush               []   Dry  Mucositis: []    None                 Grade: []        I  []        II  [x]        III  []        IV  Neck:   [x]   Supple                  []  Rigid   Diminished R neck mass            JVD:    [x]   ABSENT []   PRESENT  Lymphadenopathy:   [x]   None Noted            []   PRESENT    Chest:  [x]   Clear             anteriorly    CV:             [x]   Regular              []  Irregular               []   Tachy                []   Murmur  Abdominal:   [x]    Soft              []   NON-tender               []   Tender      BS:    []   ABSENT                   []   PRESENT  Liver:     [x]  NON-palp                  []   EDGE- palp  Spleen: [x]   NON-palp                   []  EDGE - palp  Mass:   [x]   ABSENT                          []  PRESENT  Extr:    [x]  Lymphedema             []   Cyanosis      []  Clubbing  Edema:     [x]   NONE       []   PRESENT  Skin:  Intact [x]           Purpura []        Rash: [x]   ABSENT       []  PRESENT  Neuro:  Responds to commands, moves extremities to command      Available labs reviewed:  Labs:    Recent Results (from the past 24 hour(s))   GLUCOSE, POC    Collection Time: 10/08/17 11:19 AM   Result Value Ref Range    Glucose (POC) 261 (H) 65 - 100 mg/dL    Performed by Anna Clements    GLUCOSE, POC    Collection Time: 10/08/17  4:38 PM   Result Value Ref Range    Glucose (POC) 232 (H) 65 - 100 mg/dL    Performed by Anna Clements    GLUCOSE, POC    Collection Time: 10/08/17  9:26 PM   Result Value Ref Range    Glucose (POC) 247 (H) 65 - 100 mg/dL    Performed by Golden Jimenez    CBC WITH MANUAL DIFF    Collection Time: 10/09/17  2:32 AM   Result Value Ref Range    WBC 4.8 4.1 - 11.1 K/uL    RBC 3.16 (L) 4.10 - 5.70 M/uL    HGB 9.5 (L) 12.1 - 17.0 g/dL    HCT 29.0 (L) 36.6 - 50.3 %    MCV 91.8 80.0 - 99.0 FL    MCH 30.1 26.0 - 34.0 PG    MCHC 32.8 30.0 - 36.5 g/dL    RDW 13.3 11.5 - 14.5 %    PLATELET 66 (L) 409 - 400 K/uL    NEUTROPHILS 76 (H) 32 - 75 %    BAND NEUTROPHILS 13 (H) 0 - 6 %    LYMPHOCYTES 7 (L) 12 - 49 %    MONOCYTES 3 (L) 5 - 13 %    EOSINOPHILS 0 0 - 7 %    BASOPHILS 0 0 - 1 %    METAMYELOCYTES 1 (H) 0 %    MYELOCYTES 0 0 %    PROMYELOCYTES 0 0 %    BLASTS 0 0 % OTHER CELL 0 0      ABS. NEUTROPHILS 4.3 1.8 - 8.0 K/UL    ABS. LYMPHOCYTES 0.3 (L) 0.8 - 3.5 K/UL    ABS. MONOCYTES 0.1 0.0 - 1.0 K/UL    ABS. EOSINOPHILS 0.0 0.0 - 0.4 K/UL    ABS. BASOPHILS 0.0 0.0 - 0.1 K/UL    DF MANUAL      PLATELET COMMENTS LARGE PLATELETS      RBC COMMENTS ARMANDO CELLS  PRESENT        RBC COMMENTS ANISOCYTOSIS  1+        WBC COMMENTS DOHLE BODIES      NRBC 1.8 (H) 0  WBC    ABSOLUTE NRBC 0.08 (H) 0.00 - 0.01 K/uL    WBC CORRECTED FOR NR ADJUSTED FOR NUCLEATED RBC'S      DIFFERENTIAL MANUAL DIFFERENTIAL ORDERED     METABOLIC PANEL, COMPREHENSIVE    Collection Time: 10/09/17  2:32 AM   Result Value Ref Range    Sodium 149 (H) 136 - 145 mmol/L    Potassium 3.3 (L) 3.5 - 5.1 mmol/L    Chloride 119 (H) 97 - 108 mmol/L    CO2 21 21 - 32 mmol/L    Anion gap 9 5 - 15 mmol/L    Glucose 355 (H) 65 - 100 mg/dL    BUN 51 (H) 6 - 20 MG/DL    Creatinine 1.97 (H) 0.70 - 1.30 MG/DL    BUN/Creatinine ratio 26 (H) 12 - 20      GFR est AA 40 (L) >60 ml/min/1.73m2    GFR est non-AA 33 (L) >60 ml/min/1.73m2    Calcium 8.3 (L) 8.5 - 10.1 MG/DL    Bilirubin, total 0.5 0.2 - 1.0 MG/DL    ALT (SGPT) 17 12 - 78 U/L    AST (SGOT) 11 (L) 15 - 37 U/L    Alk.  phosphatase 52 45 - 117 U/L    Protein, total 6.2 (L) 6.4 - 8.2 g/dL    Albumin 1.7 (L) 3.5 - 5.0 g/dL    Globulin 4.5 (H) 2.0 - 4.0 g/dL    A-G Ratio 0.4 (L) 1.1 - 2.2     GLUCOSE, POC    Collection Time: 10/09/17  7:14 AM   Result Value Ref Range    Glucose (POC) 386 (H) 65 - 100 mg/dL    Performed by Vivine Party    GLUCOSE, POC    Collection Time: 10/09/17  8:02 AM   Result Value Ref Range    Glucose (POC) 408 (H) 65 - 100 mg/dL    Performed by Anna Pedersen, POC    Collection Time: 10/09/17  9:57 AM   Result Value Ref Range    Glucose (POC) 398 (H) 65 - 100 mg/dL    Performed by Carmen Casper    EKG, 12 LEAD, INITIAL    Collection Time: 10/09/17 10:09 AM   Result Value Ref Range    Ventricular Rate 101 BPM    Atrial Rate 101 BPM    P-R Interval 160 ms    QRS Duration 88 ms    Q-T Interval 364 ms    QTC Calculation (Bezet) 471 ms    Calculated R Axis 68 degrees    Calculated T Axis -142 degrees    Diagnosis       Sinus tachycardia with premature supraventricular complexes  Nonspecific ST and T wave abnormality  No previous ECGs available     NUCLEATED RBC    Collection Time: 10/09/17 10:29 AM   Result Value Ref Range    NRBC 2.8 (H) 0  WBC    ABSOLUTE NRBC 0.18 (H) 0.00 - 0.01 K/uL    WBC CORRECTED FOR NR ADJUSTED FOR NUCLEATED RBC'S     POC G3 - PUL    Collection Time: 10/09/17 10:32 AM   Result Value Ref Range    FIO2 (POC) 1.0 %    pH (POC) 7.196 (LL) 7.35 - 7.45      pCO2 (POC) 51.0 (H) 35.0 - 45.0 MMHG    pO2 (POC) 96 80 - 100 MMHG    HCO3 (POC) 19.8 (L) 22 - 26 MMOL/L    sO2 (POC) 95 92 - 97 %    Base deficit (POC) 8 mmol/L    Site LEFT RADIAL      Device: AMBU      Flow rate (POC) 15 L/M    Allens test (POC) YES      Specimen type (POC) ARTERIAL         Available Xrays reviewed:    Chemotherapy monitored and toxicities assessed:    Assessment and Plan   1. Hypotension. .. Pt. Was found unresponsive this am at around 10 am,, code blue was called, initial bp was 60/40,,,,, pt. Intubated, satting well, bp back up, cause is unclear, r/o arrhythmia/sepsis/PE  2. Locally adv. Head and neck cancer. .. Pt. Received C#1 Taxotere/carboplatin/5FU beginning 9/29 for 4 days,,, he did well with treatment but was admitted for Mucositis on Friday. .. The mass is smaller today  3. Diabetes,,, glucose was 400 this am, given 8 of lantus,,, glucose was 330 during the code  4. Azotemia. .. Pt. Has chronic kidney disease, creatinine this am is improved. ... Carboplatin was given on 9/29 ,, this drug does not usually cause renal complications other than RTA  5. Anemia/thrombocytopenia. .. Secondary to chemo , he was also found to be iron deficient during the last admission, follow daily cbc  6.  Michell Treadwell MD

## 2017-10-09 NOTE — DISCHARGE SUMMARY
[address] [place] [address], [address] [facility] SUMMARY       Name:  Hubert Rainey   MR#:  [id number]   :  1940   Account #:  [de-identified]        Date of Adm:  2017       HISTORY OF PRESENT ILLNESS: The patient is a 61-year-old   gentleman with a history of locally advanced head and neck cancer,   who was admitted on 2017 for systemic chemotherapy. 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 He was referred to several different   cancer specialists on the [place] and [facility], but due to   various reasons, never got started on therapy. He saw me in August   and I determined that he was not going to be able to get the   recommended radiation plus chemo due to an inability to get his dental   care done and offered systemic chemotherapy in an attempt to gain   control of this tumor. He elected to move forward with that and was   admitted for Taxotere, carboplatin, and 5-FU on that date. For details   of the H and P, please see the history and physical.    HOSPITAL COURSE: Upon admission, the patient was hydrated in the   usual fashion and started on premedications and then was given the   Taxotere, carboplatin and 5-FU without difficulties. He had no   complaints of nausea, no complaints of mouth soreness, and over the   course of the hospital stay, his right neck appeared to be softening. His   diabetes was treated with insulin as needed. He was given   prophylactic Lovenox to prevent DVT. An anemia workup was   performed showing iron-deficiency and he did not require transfusion   support during this admission. At the time of discharge, his white count   was 6.5, hemoglobin 10.9, platelet count 205. He was in stable medical   condition at the time of discharge and had followup to come back to   our office the next day for a Neulasta. DISCHARGE MEDICATIONS: Included:   1.  Isosorbide 20 mg 2 times daily.   2. Neurontin 100 mg 3 times daily. 3. Zocor 40 mg daily. 4. Coreg 12.5 mg b.i.d.. 5. Mag-Oxide 400 mg daily. 6. Protonix 40 mg daily. 7. Amaryl 4 mg 2 times daily. 8. Lasix 40 mg daily. 9. Spiriva 1 cap by inhalation daily. He was sent home on a diabetic diet with no discharge activity   restrictions and followup to see us the day after discharge.         MD JANIE Rausch / Sal Mean   D:  10/09/2017   10:09   T:  10/09/2017   10:41   Job #:  [id number]

## 2017-10-09 NOTE — PROCEDURES
Emergent Intubation  Performed by: JAYA Triplett  Authorized by: JAYA Triplett     Emergent Intubation:   Date/Time:  10/9/2017 10:09 AM  Indications:  Respiratory failure    Spontaneous Ventilation: absent    Level of Consciousness: unresponsive  Preoxygenated:  Yes      Airway Documentation:   Airway:  ETT - Cuffed  Technique:  Direct laryngoscopy  Insertion Site:  Oral  Blade Type:  Walsh  Blade Size:  3  ETT size (mm):  7.5  ETT Line Shadi:  Teeth  ETT Insertion depth (cm):  22  Placement verified by: EtCO2 and BBS    Attempts:  1  Difficult airway: No

## 2017-10-10 LAB
ALBUMIN SERPL-MCNC: 1.6 G/DL (ref 3.5–5)
ALBUMIN/GLOB SERPL: 0.3 {RATIO} (ref 1.1–2.2)
ALP SERPL-CCNC: 74 U/L (ref 45–117)
ALT SERPL-CCNC: 16 U/L (ref 12–78)
ANION GAP SERPL CALC-SCNC: 13 MMOL/L (ref 5–15)
AST SERPL-CCNC: 12 U/L (ref 15–37)
ATRIAL RATE: 75 BPM
BACTERIA SPEC CULT: NORMAL
BASOPHILS # BLD: 0 K/UL (ref 0–0.1)
BASOPHILS NFR BLD: 0 % (ref 0–1)
BILIRUB SERPL-MCNC: 0.4 MG/DL (ref 0.2–1)
BLASTS NFR BLD MANUAL: 0 %
BUN SERPL-MCNC: 52 MG/DL (ref 6–20)
BUN/CREAT SERPL: 28 (ref 12–20)
CALCIUM SERPL-MCNC: 8.1 MG/DL (ref 8.5–10.1)
CALCULATED P AXIS, ECG09: 115 DEGREES
CALCULATED R AXIS, ECG10: 74 DEGREES
CALCULATED T AXIS, ECG11: -107 DEGREES
CC UR VC: NORMAL
CHLORIDE SERPL-SCNC: 121 MMOL/L (ref 97–108)
CO2 SERPL-SCNC: 18 MMOL/L (ref 21–32)
CREAT SERPL-MCNC: 1.83 MG/DL (ref 0.7–1.3)
DIAGNOSIS, 93000: NORMAL
DIFFERENTIAL METHOD BLD: ABNORMAL
EOSINOPHIL # BLD: 0 K/UL (ref 0–0.4)
EOSINOPHIL NFR BLD: 0 % (ref 0–7)
ERYTHROCYTE [DISTWIDTH] IN BLOOD BY AUTOMATED COUNT: 13.7 % (ref 11.5–14.5)
GLOBULIN SER CALC-MCNC: 4.9 G/DL (ref 2–4)
GLUCOSE BLD STRIP.AUTO-MCNC: 215 MG/DL (ref 65–100)
GLUCOSE BLD STRIP.AUTO-MCNC: 244 MG/DL (ref 65–100)
GLUCOSE BLD STRIP.AUTO-MCNC: 255 MG/DL (ref 65–100)
GLUCOSE BLD STRIP.AUTO-MCNC: 258 MG/DL (ref 65–100)
GLUCOSE SERPL-MCNC: 241 MG/DL (ref 65–100)
HCT VFR BLD AUTO: 29.5 % (ref 36.6–50.3)
HGB BLD-MCNC: 9.8 G/DL (ref 12.1–17)
INR PPP: 1.1 (ref 0.9–1.1)
LYMPHOCYTES # BLD: 0.5 K/UL (ref 0.8–3.5)
LYMPHOCYTES NFR BLD: 4 % (ref 12–49)
MANUAL DIFFERENTIAL PERFORMED BLD QL: ABNORMAL
MCH RBC QN AUTO: 30.5 PG (ref 26–34)
MCHC RBC AUTO-ENTMCNC: 33.2 G/DL (ref 30–36.5)
MCV RBC AUTO: 91.9 FL (ref 80–99)
METAMYELOCYTES NFR BLD MANUAL: 3 %
MONOCYTES # BLD: 0.1 K/UL (ref 0–1)
MONOCYTES NFR BLD: 1 % (ref 5–13)
MYELOCYTES NFR BLD MANUAL: 3 %
NEUTS BAND NFR BLD MANUAL: 13 % (ref 0–6)
NEUTS SEG # BLD: 11.3 K/UL (ref 1.8–8)
NEUTS SEG NFR BLD: 76 % (ref 32–75)
NRBC # BLD: 0.09 K/UL (ref 0–0.01)
NRBC BLD-RTO: 0.7 PER 100 WBC
OTHER CELLS NFR BLD MANUAL: 0 %
P-R INTERVAL, ECG05: 138 MS
PLATELET # BLD AUTO: 84 K/UL (ref 150–400)
PLATELET COMMENTS,PCOM: ABNORMAL
POTASSIUM SERPL-SCNC: 4 MMOL/L (ref 3.5–5.1)
PROMYELOCYTES NFR BLD MANUAL: 0 %
PROT SERPL-MCNC: 6.5 G/DL (ref 6.4–8.2)
PROTHROMBIN TIME: 11.3 SEC (ref 9–11.1)
Q-T INTERVAL, ECG07: 306 MS
QRS DURATION, ECG06: 80 MS
QTC CALCULATION (BEZET), ECG08: 341 MS
RBC # BLD AUTO: 3.21 M/UL (ref 4.1–5.7)
RBC MORPH BLD: ABNORMAL
RBC MORPH BLD: ABNORMAL
SERVICE CMNT-IMP: ABNORMAL
SERVICE CMNT-IMP: NORMAL
SODIUM SERPL-SCNC: 152 MMOL/L (ref 136–145)
VENTRICULAR RATE, ECG03: 75 BPM
WBC # BLD AUTO: 12.7 K/UL (ref 4.1–11.1)
WBC MORPH BLD: ABNORMAL
WBC NRBC COR # BLD: ABNORMAL 10*3/UL

## 2017-10-10 PROCEDURE — 77030021668 HC NEB PREFIL KT VYRM -A

## 2017-10-10 PROCEDURE — 74011636637 HC RX REV CODE- 636/637: Performed by: HOSPITALIST

## 2017-10-10 PROCEDURE — 94640 AIRWAY INHALATION TREATMENT: CPT

## 2017-10-10 PROCEDURE — 74011636637 HC RX REV CODE- 636/637: Performed by: INTERNAL MEDICINE

## 2017-10-10 PROCEDURE — 85610 PROTHROMBIN TIME: CPT | Performed by: HOSPITALIST

## 2017-10-10 PROCEDURE — 82962 GLUCOSE BLOOD TEST: CPT

## 2017-10-10 PROCEDURE — 36415 COLL VENOUS BLD VENIPUNCTURE: CPT | Performed by: HOSPITALIST

## 2017-10-10 PROCEDURE — 80053 COMPREHEN METABOLIC PANEL: CPT | Performed by: HOSPITALIST

## 2017-10-10 PROCEDURE — 65270000032 HC RM SEMIPRIVATE

## 2017-10-10 PROCEDURE — 85027 COMPLETE CBC AUTOMATED: CPT | Performed by: HOSPITALIST

## 2017-10-10 PROCEDURE — 74011250636 HC RX REV CODE- 250/636: Performed by: INTERNAL MEDICINE

## 2017-10-10 PROCEDURE — 77010033678 HC OXYGEN DAILY

## 2017-10-10 PROCEDURE — 74011000250 HC RX REV CODE- 250: Performed by: INTERNAL MEDICINE

## 2017-10-10 PROCEDURE — 74011250637 HC RX REV CODE- 250/637: Performed by: INTERNAL MEDICINE

## 2017-10-10 PROCEDURE — 92610 EVALUATE SWALLOWING FUNCTION: CPT

## 2017-10-10 PROCEDURE — 74011250637 HC RX REV CODE- 250/637: Performed by: HOSPITALIST

## 2017-10-10 PROCEDURE — 74011000258 HC RX REV CODE- 258: Performed by: INTERNAL MEDICINE

## 2017-10-10 RX ORDER — DEXTROSE 50 % IN WATER (D50W) INTRAVENOUS SYRINGE
12.5-25 AS NEEDED
Status: DISCONTINUED | OUTPATIENT
Start: 2017-10-10 | End: 2017-10-13 | Stop reason: HOSPADM

## 2017-10-10 RX ORDER — INSULIN LISPRO 100 [IU]/ML
INJECTION, SOLUTION INTRAVENOUS; SUBCUTANEOUS EVERY 6 HOURS
Status: DISCONTINUED | OUTPATIENT
Start: 2017-10-11 | End: 2017-10-13 | Stop reason: HOSPADM

## 2017-10-10 RX ORDER — POLYVINYL ALCOHOL 14 MG/ML
1 SOLUTION/ DROPS OPHTHALMIC AS NEEDED
Status: DISCONTINUED | OUTPATIENT
Start: 2017-10-10 | End: 2017-10-13 | Stop reason: HOSPADM

## 2017-10-10 RX ORDER — DEXTROSE MONOHYDRATE 50 MG/ML
75 INJECTION, SOLUTION INTRAVENOUS CONTINUOUS
Status: DISCONTINUED | OUTPATIENT
Start: 2017-10-10 | End: 2017-10-13 | Stop reason: HOSPADM

## 2017-10-10 RX ORDER — MAGNESIUM SULFATE 100 %
4 CRYSTALS MISCELLANEOUS AS NEEDED
Status: DISCONTINUED | OUTPATIENT
Start: 2017-10-10 | End: 2017-10-13 | Stop reason: HOSPADM

## 2017-10-10 RX ADMIN — CASTOR OIL AND BALSAM, PERU: 788; 87 OINTMENT TOPICAL at 18:00

## 2017-10-10 RX ADMIN — HUMAN INSULIN 2 UNITS: 100 INJECTION, SOLUTION SUBCUTANEOUS at 06:07

## 2017-10-10 RX ADMIN — HUMAN INSULIN 2 UNITS: 100 INJECTION, SOLUTION SUBCUTANEOUS at 12:10

## 2017-10-10 RX ADMIN — ISOSORBIDE MONONITRATE 20 MG: 20 TABLET ORAL at 09:07

## 2017-10-10 RX ADMIN — LEVOFLOXACIN 250 MG: 5 INJECTION, SOLUTION INTRAVENOUS at 14:26

## 2017-10-10 RX ADMIN — FENTANYL CITRATE 25 MCG: 50 INJECTION, SOLUTION INTRAMUSCULAR; INTRAVENOUS at 23:31

## 2017-10-10 RX ADMIN — HUMAN INSULIN 3 UNITS: 100 INJECTION, SOLUTION SUBCUTANEOUS at 17:08

## 2017-10-10 RX ADMIN — ENOXAPARIN SODIUM 40 MG: 100 INJECTION SUBCUTANEOUS at 09:07

## 2017-10-10 RX ADMIN — DIPHENHYDRAMINE HYDROCHLORIDE 5 ML: 12.5 SOLUTION ORAL at 12:12

## 2017-10-10 RX ADMIN — SODIUM CHLORIDE 100 ML/HR: 900 INJECTION, SOLUTION INTRAVENOUS at 00:08

## 2017-10-10 RX ADMIN — ZOLPIDEM TARTRATE 5 MG: 5 TABLET ORAL at 00:29

## 2017-10-10 RX ADMIN — IPRATROPIUM BROMIDE 0.5 MG: 0.5 SOLUTION RESPIRATORY (INHALATION) at 07:55

## 2017-10-10 RX ADMIN — IPRATROPIUM BROMIDE 0.5 MG: 0.5 SOLUTION RESPIRATORY (INHALATION) at 01:03

## 2017-10-10 RX ADMIN — DIPHENHYDRAMINE HYDROCHLORIDE 5 ML: 12.5 SOLUTION ORAL at 22:11

## 2017-10-10 RX ADMIN — CARVEDILOL 12.5 MG: 12.5 TABLET, FILM COATED ORAL at 09:07

## 2017-10-10 RX ADMIN — FENTANYL CITRATE 25 MCG: 50 INJECTION, SOLUTION INTRAMUSCULAR; INTRAVENOUS at 00:30

## 2017-10-10 RX ADMIN — DIPHENHYDRAMINE HYDROCHLORIDE 5 ML: 12.5 SOLUTION ORAL at 17:10

## 2017-10-10 RX ADMIN — IPRATROPIUM BROMIDE 0.5 MG: 0.5 SOLUTION RESPIRATORY (INHALATION) at 15:20

## 2017-10-10 RX ADMIN — INSULIN LISPRO 2 UNITS: 100 INJECTION, SOLUTION INTRAVENOUS; SUBCUTANEOUS at 23:30

## 2017-10-10 RX ADMIN — DEXTROSE MONOHYDRATE 75 ML/HR: 5 INJECTION, SOLUTION INTRAVENOUS at 11:07

## 2017-10-10 RX ADMIN — CEFEPIME 2 G: 2 INJECTION, POWDER, FOR SOLUTION INTRAVENOUS at 23:31

## 2017-10-10 RX ADMIN — CEFEPIME 2 G: 2 INJECTION, POWDER, FOR SOLUTION INTRAVENOUS at 12:10

## 2017-10-10 RX ADMIN — DIPHENHYDRAMINE HYDROCHLORIDE 5 ML: 12.5 SOLUTION ORAL at 09:08

## 2017-10-10 NOTE — PROGRESS NOTES
Problem: Dysphagia (Adult)  Goal: *Acute Goals and Plan of Care (Insert Text)  Speech pathology goals  Initiated 10/10/2017  1. Patient will participate in re-evaluation of swallow function daily  2. Patient will participate in [address] as clinically indicated   [address] EVALUATION  Patient: Stefany Dominguez (6 y.o. male)  Date: 10/10/2017  Primary Diagnosis: Tongue cancer ([facility]  Mucositis        Precautions: swallow         ASSESSMENT :  Based on the objective data described below, the patient presents with moderate-severe oropharyngeal dysphagia. Oral dysphagia characterized by decreased movement of all oral structures. Patient with poor, minimal bolus manipulation and delayed, discoordinated posterior propulsion. Pharyngeal dysphagia characterized by delayed swallow initiation and decreased/weak hyolaryngeal elevation/excursion. Patient with multiple swallows and throat clearing with minimal PO trials, suspect related to pharyngeal residue and subsequent penetration or aspiration of residue. Patient is at high risk for aspiration secondary to head and neck cancer s/p chemotherapy and recent intubation. Per chart review, patient did not undergo radiation therapy as he did not follow through with prerequisite dental work. Recommend NPO and SLP to re-evaluate tomorrow. May benefit from MBS if swallow function does not improve. Patient will benefit from skilled intervention to address the above impairments.   Patients rehabilitation potential is considered to be Fair  Factors which may influence rehabilitation potential include:   [ ]            None noted  [ ]            Mental ability/status  [X]            Medical condition  [ ]            Home/family situation and support systems  [ ]            Safety awareness  [X]            Pain tolerance/management  [ ]            Other:        PLAN :  Recommendations and Planned Interventions:  --Strict NPO  --Frequent oral care  --SLP will follow for re-evaluation of swallow function. May benefit from [facility]   Frequency/Duration: Patient will be followed by speech-language pathology 4 times a week to address goals. Discharge Recommendations: To Be Determined       SUBJECTIVE:   Patient with dysarthric speech and poor intelligibility. OBJECTIVE:       Past Medical History:   Diagnosis Date    Cancer ([facility] [address]       tongue with mets     Diabetes ([facility]      Hypercholesteremia      Hypertension     History reviewed. No pertinent surgical history. Prior Level of Function/Home Situation:   Home Situation  Home Environment: Private residence  # Steps to Enter: 0  One/Two Story Residence: One story  Living Alone: No  Support Systems: Family member(s)  Patient Expects to be Discharged to[de-identified] Private residence  Current DME Used/Available at Home: None  Diet prior to admission: mechanical soft/thin liquid per patient report  Current Diet:  NPO   Cognitive and Communication Status:  Neurologic State: Alert  Orientation Level: Oriented X4 (suspected, patient with signfiicant dysarthria)  Cognition: Follows commands  Perception: Appears intact  Perseveration: No perseveration noted  Safety/Judgement: Awareness of environment, Insight into deficits  Oral Assessment:  Oral Assessment  Labial: Decreased rate;Decreased seal  Dentition: Natural;Limited  Oral Hygiene: dried blood in oral cavity, RN aware  Lingual: Decreased rate;Decreased strength  Velum: Unable to visualize  Mandible: No impairment  P.O. Trials:  Patient Position: upright in bed  Vocal quality prior to P.O.: No impairment; Other (comment) (dysarthric)  Consistency Presented: Ice chips; Thin liquid  How Presented: SLP-fed/presented;Spoon     Bolus Acceptance: No impairment  Bolus Formation/Control: Impaired  Type of Impairment: Delayed; Incomplete;Poor  Propulsion: Delayed (# of seconds); Discoordination  Oral Residue: None  Initiation of Swallow: Delayed (# of seconds)  Laryngeal Elevation: Decreased;Weak  Aspiration Signs/Symptoms: Clear throat  Pharyngeal Phase Characteristics: Multiple swallows; Suspected pharyngeal residue  Effective Modifications: None  Cues for Modifications: None        Oral Phase Severity: Moderate-severe  Pharyngeal Phase Severity : Moderate-severe     NOMS:   The NOMS functional outcome measure was used to quantify this patient's level of swallowing impairment. Based on the NOMS, the patient was determined to be at level 1 for swallow function      G Codes: In compliance with CMSs Claims Based Outcome Reporting, the following G-code set was chosen for this patient based the use of the NOMS functional outcome to quantify this patient's level of swallowing impairment. Using the NOMS, the patient was determined to be at level 1 for swallow function which correlates with the CN= 100% level of severity. Based on the objective assessment provided within this note, the current, goal, and discharge g-codes are as follows:     Swallow  Swallowing:   Swallow Current Status CN= 100%   Swallow Goal Status CM= 80-99%         NOMS Swallowing Levels:  Level 1 (CN): NPO  Level 2 (CM): NPO but takes consistency in therapy  Level 3 (CL): Takes less than 50% of nutrition p.o. and continues with nonoral feedings; and/or safe with mod cues; and/or max diet restriction  Level 4 (CK): Safe swallow but needs mod cues; and/or mod diet restriction; and/or still requires some nonoral feeding/supplements  Level 5 (CJ): Safe swallow with min diet restriction; and/or needs min cues  Level 6 (CI): Independent with p.o.; rare cues; usually self cues; may need to avoid some foods or needs extra time  Level 7 ([address] [place] [address]): Independent for all p.o.  SOL. (2003). National Outcomes Measurement System (NOMS): Adult Speech-Language Pathology User's Guide.            Pain:  Pain Scale 1: Numeric (0 - 10)  Pain Intensity 1: 0     After treatment:   [ ]            Patient left in no apparent distress sitting up in chair  [X]            Patient left in no apparent distress in bed  [X]            Call bell left within reach  [X]            Nursing notified  [ ]            Caregiver present  [ ]            Bed alarm activated      COMMUNICATION/EDUCATION:   The patients plan of care including recommendations, planned interventions, and recommended diet changes were discussed with: Registered Nurse. [X]            Posted safety precautions in patient's room. [X]            Patient/family have participated as able in goal setting and plan of care. [X]            Patient/family agree to work toward stated goals and plan of care. [ ]            Patient understands intent and goals of therapy, but is neutral about his/her participation. [ ]            Patient is unable to participate in goal setting and plan of care.      Thank you for this referral.  OMAYRA Grande  Time Calculation: 17 mins

## 2017-10-10 NOTE — PROGRESS NOTES
0730: Bedside and Verbal shift change report given to Wood Hernandez RN (oncoming nurse) by Mitchell Kan RN (offgoing nurse). Report included the following information SBAR, Kardex, Intake/Output, MAR, Recent Results and Cardiac Rhythm NSR.     0800:  Assessment complete. No new issues. Patient up to in recliner, watching tv.      0275:  Paged Dr. Mikie Pittman to clarify orders. Awaiting callback. 0930:  Dr. Memory Parrish on unit and questions clarified. 1120:  TRANSFER - OUT REPORT:    Verbal report given to Bernadine Duggan RN(name) on Liberty Flowers  being transferred to Oncology(unit) for routine progression of care       Report consisted of patients Situation, Background, Assessment and   Recommendations(SBAR). Information from the following report(s) SBAR, Kardex, Intake/Output, MAR, Recent Results and Cardiac Rhythm NSR was reviewed with the receiving nurse. Lines:   Peripheral IV 10/06/17 Left Forearm (Active)   Site Assessment Clean, dry, & intact 10/10/2017  8:00 AM   Phlebitis Assessment 0 10/10/2017  8:00 AM   Infiltration Assessment 0 10/10/2017  8:00 AM   Dressing Status Clean, dry, & intact 10/10/2017  8:00 AM   Dressing Type Transparent 10/10/2017  8:00 AM   Hub Color/Line Status Pink; Infusing 10/10/2017  8:00 AM   Action Taken Open ports on tubing capped 10/10/2017  8:00 AM   Alcohol Cap Used Yes 10/10/2017  8:00 AM       Peripheral IV 10/09/17 Right Forearm (Active)   Site Assessment Clean, dry, & intact 10/10/2017  8:00 AM   Phlebitis Assessment 0 10/10/2017  8:00 AM   Infiltration Assessment 0 10/10/2017  8:00 AM   Dressing Status Clean, dry, & intact 10/10/2017  8:00 AM   Dressing Type Transparent 10/10/2017  8:00 AM   Hub Color/Line Status Blue;Flushed;Capped 10/10/2017  8:00 AM   Action Taken Open ports on tubing capped 10/10/2017  4:00 AM   Alcohol Cap Used Yes 10/10/2017  8:00 AM        Opportunity for questions and clarification was provided.       Patient transported with:   O2 @ 2 liters  Registered Nurse Medications

## 2017-10-10 NOTE — PROGRESS NOTES
Hematology-Oncology Progress Note    Raúl Manzanares  1940  [id number]  10/10/2017    Follow-up for: head and neck cancer     [x]        Chart notes since last visit reviewed   [x]        Medications reviewed for allergies and interactions       Case discussed with the following:         []                            [x]        Nursing Staff                                                                         []        Pathologist                                                                        []        FAMILY                                                                         Subjective:     Spoke with patient who complains of: pt. Had a good night, was extubated yesterday, has dry sore mouth no other c/o today    Objective:     Patient Vitals for the past 24 hrs:   BP Temp Pulse Resp SpO2 Weight   10/10/17 0800 148/73 96.6 °F (35.9 °C) 78 25 97 % -   10/10/17 0756 - - - - 94 % -   10/10/17 0600 154/64 - 74 19 96 % -   10/10/17 0500 - - 80 18 - -   10/10/17 0400 155/70 98.1 °F (36.7 °C) 82 20 100 % -   10/10/17 0300 162/77 - 89 22 - -   10/10/17 0200 150/79 - 79 16 95 % -   10/10/17 0124 - - - - - 82.8 kg (182 lb 9.6 oz)   10/10/17 0103 - - - - 92 % -   10/10/17 0100 156/58 - 88 19 92 % -   10/10/17 0000 126/62 97.4 °F (36.3 °C) 82 22 95 % -   10/09/17 2300 122/60 - 73 22 94 % -   10/09/17 2200 136/65 - 74 19 95 % -   10/09/17 2100 120/58 - 77 20 95 % -   10/09/17 2005 - - - - 95 % -   10/09/17 2000 129/64 97.6 °F (36.4 °C) 77 20 96 % -   10/09/17 1900 132/70 - 74 21 95 % -   10/09/17 1800 140/57 - 99 21 96 % -   10/09/17 1700 133/79 - 82 18 92 % 83.4 kg (183 lb 13.8 oz)   10/09/17 1600 140/69 97.9 °F (36.6 °C) 86 14 96 % -   10/09/17 1538 - - - - 96 % -   10/09/17 1500 141/63 - 91 27 96 % -   10/09/17 1400 139/66 - - - - -   10/09/17 1337 - - - - 96 % -   10/09/17 1300 133/65 - 99 19 95 % -   10/09/17 1200 129/65 - 80 20 100 % -   10/09/17 1145 111/75 97.3 °F (36.3 °C) 87 21 97 % - 10/09/17 1130 104/56 - 74 23 97 % -       REVIEW OF SYSTEMS:    Not obtainable    Constitutional:  Patient looks  []        Sick  []        Frail  [x]        Better                                                 []        Depressed    HEENT:  [x]   NC                         []   AT               []    ALOPECIA         ngt tube in place  Eyes: [x]   Normal               []    Icteric  Oropharynx: []    Normal                  []  Thrush               []   Dry  Mucositis: []    None                 Grade: []        I  []        II  [x]        III  []        IV  Neck:   [x]   Supple                  []  Rigid   Diminished R neck mass            JVD:    [x]   ABSENT       []   PRESENT  Lymphadenopathy:   [x]   None Noted            []   PRESENT    Chest:  [x]   Clear             anteriorly    CV:             [x]   Regular              []  Irregular               []   Tachy                []   Murmur  Abdominal:   [x]    Soft              []   NON-tender               []   Tender      BS:    []   ABSENT                   []   PRESENT  Liver:     [x]  NON-palp                  []   EDGE- palp  Spleen: [x]   NON-palp                   []  EDGE - palp  Mass:   [x]   ABSENT                          []  PRESENT  Extr:    [x]  Lymphedema             []   Cyanosis      []  Clubbing  Edema:     [x]   NONE       []   PRESENT  Skin:  Intact [x]           Purpura []        Rash: [x]   ABSENT       []  PRESENT  Neuro:  Responds to commands, moves extremities to command      Available labs reviewed:  Labs:    Recent Results (from the past 24 hour(s))   GLUCOSE, POC    Collection Time: 10/09/17 11:36 AM   Result Value Ref Range    Glucose (POC) 351 (H) 65 - 100 mg/dL    Performed by [facility]    URINALYSIS W/ REFLEX CULTURE    Collection Time: 10/09/17 12:13 PM   Result Value Ref Range    Color YELLOW/STRAW      Appearance TURBID (A) CLEAR      Specific gravity 1.025 1.003 - 1.030      pH (UA) 6.0 5.0 - 8.0      Protein 100 (A) NEG mg/dL    Glucose 500 (A) NEG mg/dL    Ketone NEGATIVE  NEG mg/dL    Bilirubin NEGATIVE  NEG      Blood LARGE (A) NEG      Urobilinogen 0.2 0.2 - 1.0 EU/dL    Nitrites NEGATIVE  NEG      Leukocyte Esterase NEGATIVE  NEG      WBC 5-10 0 - 4 /hpf    RBC 5-10 0 - 5 /hpf    Epithelial cells MODERATE (A) FEW /lpf    Bacteria 3+ (A) NEG /hpf    UA:UC IF INDICATED URINE CULTURE ORDERED (A) CNI      Granular cast 2-5 (A) NEG /lpf   EKG, 12 LEAD, INITIAL    Collection Time: 10/09/17 12:38 PM   Result Value Ref Range    Ventricular Rate 73 BPM    Atrial Rate 73 BPM    P-R Interval 140 ms    QRS Duration 80 ms    Q-T Interval 544 ms    QTC Calculation (Bezet) 599 ms    Calculated P Axis 102 degrees    Calculated R Axis 71 degrees    Calculated T Axis 53 degrees    Diagnosis       Sinus rhythm with occasional premature ventricular complexes and premature   atrial complexes  Nonspecific T wave abnormality  Prolonged QT  When compared with ECG of 09-OCT-2017 10:09,  premature ventricular complexes are now present  Nonspecific T wave abnormality now evident in Anterior leads  QT has lengthened     CBC W/O DIFF    Collection Time: 10/09/17  1:54 PM   Result Value Ref Range    WBC 8.0 4.1 - 11.1 K/uL    RBC 3.23 (L) 4.10 - 5.70 M/uL    HGB 9.7 (L) 12.1 - 17.0 g/dL    HCT 29.9 (L) 36.6 - 50.3 %    MCV 92.6 80.0 - 99.0 FL    MCH 30.0 26.0 - 34.0 PG    MCHC 32.4 30.0 - 36.5 g/dL    RDW 13.6 11.5 - 14.5 %    PLATELET 70 (L) 781 - 400 K/uL   D DIMER    Collection Time: 10/09/17  1:54 PM   Result Value Ref Range    D-dimer 11.96 (H) 0.00 - 0.65 mg/L FEU   GLUCOSE, POC    Collection Time: 10/09/17  4:08 PM   Result Value Ref Range    Glucose (POC) 182 (H) 65 - 100 mg/dL    Performed by [facility], POC    Collection Time: 10/09/17 10:26 PM   Result Value Ref Range    Glucose (POC) 142 (H) 65 - 100 mg/dL    Performed by [address] [place] [address], POC    Collection Time: 10/10/17  5:48 AM   Result Value Ref Range Glucose (POC) 244 (H) 65 - 100 mg/dL    Performed by Humaira Gonzalez    CBC WITH MANUAL DIFF    Collection Time: 10/10/17  5:51 AM   Result Value Ref Range    WBC 12.7 (H) 4.1 - 11.1 K/uL    RBC 3.21 (L) 4.10 - 5.70 M/uL    HGB 9.8 (L) 12.1 - 17.0 g/dL    HCT 29.5 (L) 36.6 - 50.3 %    MCV 91.9 80.0 - 99.0 FL    MCH 30.5 26.0 - 34.0 PG    MCHC 33.2 30.0 - 36.5 g/dL    RDW 13.7 11.5 - 14.5 %    PLATELET 84 (L) 628 - 400 K/uL    NEUTROPHILS 76 (H) 32 - 75 %    BAND NEUTROPHILS 13 (H) 0 - 6 %    LYMPHOCYTES 4 (L) 12 - 49 %    MONOCYTES 1 (L) 5 - 13 %    EOSINOPHILS 0 0 - 7 %    BASOPHILS 0 0 - 1 %    METAMYELOCYTES 3 (H) 0 %    MYELOCYTES 3 (H) 0 %    PROMYELOCYTES 0 0 %    BLASTS 0 0 %    OTHER CELL 0 0      ABS. NEUTROPHILS 11.3 (H) 1.8 - 8.0 K/UL    ABS. LYMPHOCYTES 0.5 (L) 0.8 - 3.5 K/UL    ABS. MONOCYTES 0.1 0.0 - 1.0 K/UL    ABS. EOSINOPHILS 0.0 0.0 - 0.4 K/UL    ABS. BASOPHILS 0.0 0.0 - 0.1 K/UL    DF MANUAL      PLATELET COMMENTS LARGE PLATELETS      RBC COMMENTS ANISOCYTOSIS  1+        RBC COMMENTS ARMANDO CELLS  PRESENT        WBC COMMENTS DOHLE BODIES      NRBC 0.7 (H) 0  WBC    ABSOLUTE NRBC 0.09 (H) 0.00 - 0.01 K/uL    WBC CORRECTED FOR NR ADJUSTED FOR NUCLEATED RBC'S      DIFFERENTIAL MANUAL DIFFERENTIAL ORDERED     METABOLIC PANEL, COMPREHENSIVE    Collection Time: 10/10/17  5:51 AM   Result Value Ref Range    Sodium 152 (H) 136 - 145 mmol/L    Potassium 4.0 3.5 - 5.1 mmol/L    Chloride 121 (H) 97 - 108 mmol/L    CO2 18 (L) 21 - 32 mmol/L    Anion gap 13 5 - 15 mmol/L    Glucose 241 (H) 65 - 100 mg/dL    BUN 52 (H) 6 - 20 MG/DL    Creatinine 1.83 (H) 0.70 - 1.30 MG/DL    BUN/Creatinine ratio 28 (H) 12 - 20      GFR est AA 44 (L) >60 ml/min/1.73m2    GFR est non-AA 36 (L) >60 ml/min/1.73m2    Calcium 8.1 (L) 8.5 - 10.1 MG/DL    Bilirubin, total 0.4 0.2 - 1.0 MG/DL    ALT (SGPT) 16 12 - 78 U/L    AST (SGOT) 12 (L) 15 - 37 U/L    Alk.  phosphatase 74 45 - 117 U/L    Protein, total 6.5 6.4 - 8.2 g/dL Albumin 1.6 (L) 3.5 - 5.0 g/dL    Globulin 4.9 (H) 2.0 - 4.0 g/dL    A-G Ratio 0.3 (L) 1.1 - 2.2     PROTHROMBIN TIME + INR    Collection Time: 10/10/17  5:51 AM   Result Value Ref Range    INR 1.1 0.9 - 1.1      Prothrombin time 11.3 (H) 9.0 - 11.1 sec       Available Xrays reviewed:    Chemotherapy monitored and toxicities assessed:    Assessment and Plan   1. Hypotension. .. Pt. Was found unresponsive on 10/9 code blue was called, initial bp was 60/40,,,,, symptoms have now resolved, cause is unclear, r/o arrhythmia/sepsis/PE, pt. Now extubated is hemodynamically stable , we can move him back to [address].  D/c NGT,,, obtain speech Rx consult for swallowing eval  2. Locally adv. Head and neck cancer. .. Pt. Received C#1 Taxotere/carboplatin/5FU beginning 9/29 for 4 days,,, he did well with treatment but was admitted for Mucositis on Friday. .. The mass is smaller today  3. Diabetes,,, continue current mgmt  4. Azotemia. .. Pt. Has chronic kidney disease, creatinine this am is improved. at 1.8. Zahira Riser Zahira Riser Carboplatin was given on 9/29 ,, this drug does not usually cause renal complications other than RTA  5. Anemia/thrombocytopenia. ..  Secondary to chemo , he was also found to be iron deficient during the last admission, follow daily Tiffani Wilkinson MD

## 2017-10-10 NOTE — PROGRESS NOTES
Spiritual Care Assessment/Progress Notes    Nalini Mitchell [id number]  [id number]    1940  69 y.o.  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   []         Not able to assess due to medical condition    Services Provided Today:  []         crisis intervention    []         reading Scriptures  []         spiritual assessment    []         prayer  []         empathic listening/emotional support  []         rites and rituals (cite in comments)  []         life review     []         Scientologist support  []         theological development   []         advocacy  []         ethical dialog     []         blessing  []         bereavement support    []         support to family  []         anticipatory grief support   [x]         help with AMD  []         spiritual guidance    []         meditation      Spiritual Care Needs  []         Emotional Support  []         Spiritual/Mandaen Care  []         Loss/Adjustment  []         Advocacy/Referral                /Ethics  [x]         No needs expressed at               this time  []         Other: (note in               comments)  [address]  []         Follow up visits with               pt/family  []         Provide materials  []         Schedule sacraments  []         Contact Community               Clergy  [x]         Follow up as needed  []         Other: (note in               comments)     Request by Mr. Rody Tellez to assist with advance medical directive. Explained document to patient, daughter Render Dad, and family who were present in the room. Assisted patient in completing the document. Made copy and placed it in patient's chart. Returned original document to the patient and copies to Render Dad, who was selected by Mr. Rody Telelz to serve as MPOA. Daughter Savage Aceves is secondary MPOA. [facility]'s Staff  (Sobia Gonzalez Patient Care Specialist)   Paging Service [phone])

## 2017-10-10 NOTE — PROGRESS NOTES
Shift summary:  A+O, GREEN, VSS, pt on 2L NC frequent productive cough. Patient had very restless night, unable to get any meaningful sleep- max time 30min despite Ambien, repositioning and changing to being up in recliner. R jaw/mouth pain tolerable most of night- PRN fentanyl administered x1 per pt request.   UOP adequate. Frequent mouth care.

## 2017-10-10 NOTE — PROGRESS NOTES
NAME: Sophie Carvajal        :  1940        MRN:  [id number]        Assessment :    Plan:  --Head/Neck Cancer  RODOLFO-volume depletion/loop diuretics  Free water deficit  Mucositis  Pancytopenia  Hypernatremia  Resp. Arrest 10/9  Shock --Creatinine a little better. Continue IVF. Agree with hypotonic fluids. Good UO S/P Reyes. Suspect obstruction. Subjective:     Chief Complaint: Extubated. Quiet. Review of Systems: UTO    Symptom Y/N Comments  Symptom Y/N Comments   Fever/Chills    Chest Pain     Poor Appetite    Edema     Cough    Abdominal Pain     Sputum    Joint Pain     SOB/JAIMES    Pruritis/Rash     Nausea/vomit    Tolerating PT/OT     Diarrhea    Tolerating Diet     Constipation    Other       Could not obtain due to:      Objective:     VITALS:   Last 24hrs VS reviewed since prior progress note.  Most recent are:  Visit Vitals    /73 (BP 1 Location: Right arm, BP Patient Position: At rest)    Pulse 78    Temp 96.6 °F (35.9 °C)    Resp 25    Ht 5' 9\" (1.753 m)    Wt 82.8 kg (182 lb 9.6 oz)    SpO2 97%    BMI 26.97 kg/m2       Intake/Output Summary (Last 24 hours) at 10/10/17 1223  Last data filed at 10/10/17 1100   Gross per 24 hour   Intake          1852.28 ml   Output             1181 ml   Net           671.28 ml      Telemetry Reviewed:     PHYSICAL EXAM:  General: NAD  Bilateral rhonchi  abd soft  No edema      Lab Data Reviewed: (see below)    Medications Reviewed: (see below)    PMH/SH reviewed - no change compared to H&P  ________________________________________________________________________  Care Plan discussed with:  Patient     Family      RN     Care Manager                    Consultant:          Comments   >50% of visit spent in counseling and coordination of care       ________________________________________________________________________  Barak Subramanian MD     Procedures: see electronic medical records for all procedures/Xrays and details which  were not copied into this note but were reviewed prior to creation of Plan. LABS:  Recent Labs      10/10/17   0551  10/09/17   1354   WBC  12.7*  8.0   HGB  9.8*  9.7*   HCT  29.5*  29.9*   PLT  84*  70*     Recent Labs      10/10/17   0551  10/09/17   1029  10/09/17   0232  10/08/17   0429   NA  152*  144  149*  153*   K  4.0  3.2*  3.3*  3.7   CL  121*  115*  119*  122*   CO2  18*  21  21  21   BUN  52*  47*  51*  44*   CREA  1.83*  1.98*  1.97*  2.11*   GLU  241*  414*  355*  249*   CA  8.1*  8.1*  8.3*  8.2*   MG   --   2.5*   --   2.1   PHOS   --   3.6   --   2.2*     Recent Labs      10/10/17   0551  10/09/17   1029  10/09/17   0232   SGOT  12*  14*  11*   AP  74  59  52   TP  6.5  6.5  6.2*   ALB  1.6*  1.7*  1.7*   GLOB  4.9*  4.8*  4.5*     Recent Labs      10/10/17   0551   INR  1.1   PTP  11.3*      No results for input(s): FE, TIBC, PSAT, FERR in the last 72 hours. Lab Results   Component Value Date/Time    Folate 13.8 09/30/2017 03:43 AM      No results for input(s): PH, PCO2, PO2 in the last 72 hours.   Recent Labs      10/09/17   1029   CPK  101     No components found for: [place]  Lab Results   Component Value Date/Time    Color YELLOW/STRAW 10/09/2017 12:13 PM    Appearance TURBID 10/09/2017 12:13 PM    Specific gravity 1.025 10/09/2017 12:13 PM    pH (UA) 6.0 10/09/2017 12:13 PM    Protein 100 10/09/2017 12:13 PM    Glucose 500 10/09/2017 12:13 PM    Ketone NEGATIVE  10/09/2017 12:13 PM    Bilirubin NEGATIVE  10/09/2017 12:13 PM    Urobilinogen 0.2 10/09/2017 12:13 PM    Nitrites NEGATIVE  10/09/2017 12:13 PM    Leukocyte Esterase NEGATIVE  10/09/2017 12:13 PM    Epithelial cells MODERATE 10/09/2017 12:13 PM    Bacteria 3+ 10/09/2017 12:13 PM    WBC 5-10 10/09/2017 12:13 PM    RBC 5-10 10/09/2017 12:13 PM       MEDICATIONS:  Current Facility-Administered Medications   Medication Dose Route Frequency    insulin regular (NOVOLIN R, HUMULIN R) injection   SubCUTAneous Q6H    dextrose 5% infusion  75 mL/hr IntraVENous CONTINUOUS    fentaNYL citrate (PF) injection 25 mcg  25 mcg IntraVENous Q4H PRN    levoFLOXacin (LEVAQUIN) 250 mg in D5W IVPB  250 mg IntraVENous Q24H    acetaminophen (TYLENOL) tablet 500 mg  500 mg Oral Q6H PRN    albuterol (PROVENTIL VENTOLIN) nebulizer solution 2.5 mg  2.5 mg Nebulization Q4H PRN    carvedilol (COREG) tablet 12.5 mg  12.5 mg Oral BID WITH MEALS    isosorbide mononitrate (ISMO, MONOKET) tablet 20 mg  20 mg Oral BID    magic mouthwash cpd (without sucralfate)  5 mL Oral QID    nicotine (NICODERM CQ) 21 mg/24 hr patch 1 Patch  1 Patch TransDERmal Q24H    oxyCODONE-acetaminophen (PERCOCET) 5-325 mg per tablet 1 Tab  1 Tab Oral Q4H PRN    tamsulosin (FLOMAX) capsule 0.4 mg  0.4 mg Oral DAILY    ipratropium (ATROVENT) 0.02 % nebulizer solution 0.5 mg  0.5 mg Nebulization Q6H RT    enoxaparin (LOVENOX) injection 40 mg  40 mg SubCUTAneous DAILY    ondansetron (ZOFRAN) injection 4 mg  4 mg IntraVENous Q6H PRN    zolpidem (AMBIEN) tablet 5 mg  5 mg Oral QHS PRN    cloNIDine (CATAPRES) 0.2 mg/24 hr patch 1 Patch  1 Patch TransDERmal ONCE    glucose chewable tablet 16 g  4 Tab Oral PRN    dextrose (D50W) injection syrg 12.5-25 g  12.5-25 g IntraVENous PRN    glucagon (GLUCAGEN) injection 1 mg  1 mg IntraMUSCular PRN    cefepime (MAXIPIME) 2 g in 0.9% sodium chloride (MBP/ADV) 100 mL  2 g IntraVENous Q12H

## 2017-10-10 NOTE — ACP (ADVANCE CARE PLANNING)
Request by Mr. Eugene Rosario to assist with advance medical directive. Explained document to patient, daughter Miguel Marte, and family who were present in the room. Assisted patient in completing the document. Made copy and placed it in patient's chart. Returned original document to the patient and copies to Miguel Emilyabelcortes, who was selected by Mr. Eugene Rosario to serve as MPOA. Stan Murcia is secondary MPOA. [facility]'s Staff  ([facility])   Paging Service [phone])

## 2017-10-10 NOTE — PROGRESS NOTES
TRANSFER - IN REPORT:    Verbal report received from Ward, RN(name) on Raymon Holcomb  being received from ICU(unit) for routine progression of care      Report consisted of patients Situation, Background, Assessment and   Recommendations(SBAR). Information from the following report(s) SBAR, Kardex and MAR was reviewed with the receiving nurse. Opportunity for questions and clarification was provided. Assessment completed upon patients arrival to unit and care assumed. Primary Nurse Hany Blood and Kat Harrison RN performed a dual skin assessment on this patient Impairment noted- see wound doc flow sheet  Benny score is 16    Patient's sacrum is intact, but purple in color some areas not blanchable.            1500-Notified MD that SLP is recommending patient remain NPO and possible barium swallow in the AM.

## 2017-10-10 NOTE — WOUND CARE
WOCN Note:     New consult placed by RN for sacrum. Chart shows:  Admitted for tongue cancer; history of squamous cell carcinoma  Code Blue called 10/9/17: propofol, transfer to ICU, intubated    Assessment:   Patient is now extubated and communicative. Able to turn independently for assessment. Bed: [facility]  Patient has a Reyes. Patient reports no pain. Bilateral heel skin intact and without erythema. Sacrum and bilateral upper buttocks with intact fully blanching diffuse pink. Patient repositioned on left side with pillow between the knees. Recommendations:    Venelex to sacrum and buttocks twice daily. Skin Care & Pressure Prevention:  Minimize layers of linen/pads under patient to optimize support surface. Turn/reposition approximately every 2 hours and offload heels. Manage incontinence / promote continence    Discussed above plan with patient & his family.     Transition of Care: Plan to follow weekly and as needed while admitted to hospital.    KOBI TabaresN, RN, [employer]  Certified Wound, Ostomy, Continence Nurse  office [phone]  pager [phone] or call  to page

## 2017-10-10 NOTE — PROGRESS NOTES
Pulmonary, Critical Care, and Sleep Medicine~Progress Note    Name: Chu Raymundo MRN: [id number]   : 1940 [facility]   Date: 10/10/2017 8:35 AM Admission: 10/6/2017     IMPRESSION:   · T4N1 Squamous cell base of mouth. S/p taxotere/carboplatin/5FU in 2017  · Acute hypercapnic resp failure, suspected to be secondary to morphine. Resolved    · RODOLFO    · Pancytopenia  · DM II  · CAD  · GERD  · HTN      PLAN:   · Fluids   · Avoid sedative medications   · O2 titration above 90%   · Lovenox   · On levaquin/cefepime   · Can move to 6east  · We will be available again to see if needed      Daily Progression:    Wants to go home.   No acute complaints     OBJECTIVE:     Vital Signs:       Visit Vitals    /64    Pulse 74    Temp 98.1 °F (36.7 °C)    Resp 19    Ht 5' 9\" (1.753 m)    Wt 82.8 kg (182 lb 9.6 oz)    SpO2 94%    BMI 26.97 kg/m2      Temp (24hrs), Av.7 °F (36.5 °C), Min:97.3 °F (36.3 °C), Max:98.1 °F (36.7 °C)     Intake/Output:     Last shift:      Last 3 shifts: 10/08 1901 - 10/10 0700  In: 3477.3 [I.V.:3477.3]  Out: 3622 [Urine:3356]        Intake/Output Summary (Last 24 hours) at 10/10/17 0835  Last data filed at 10/10/17 0600   Gross per 24 hour   Intake          3477.33 ml   Output             2431 ml   Net          1046.33 ml       Physical Exam:                                        Exam Findings Other   General: No resp distress noted, appears stated age    HEENT:  No ulcers, JVD not elevated, no cervical LAD    Chest: No pectus deformity, normal chest rise b/l    HEART:  RRR, no murmurs/rubs/gallops    Lungs:  CTA b/l, no rhonchi/crackles/wheeze, diminished BS at bases    ABD: Soft/NT, non rigid mildly distended    EXT: No cyanosis/clubbing/edema, normal peripheral pulses    Skin: No rashes or ulcers, no mottling    Neuro: A/O x 3        Medications:  Current Facility-Administered Medications   Medication Dose Route Frequency    insulin regular (NOVOLIN R, HUMULIN R) injection   SubCUTAneous Q6H    dextrose 5% infusion  75 mL/hr IntraVENous CONTINUOUS    fentaNYL citrate (PF) injection 25 mcg  25 mcg IntraVENous Q4H PRN    levoFLOXacin (LEVAQUIN) 250 mg in D5W IVPB  250 mg IntraVENous Q24H    acetaminophen (TYLENOL) tablet 500 mg  500 mg Oral Q6H PRN    albuterol (PROVENTIL VENTOLIN) nebulizer solution 2.5 mg  2.5 mg Nebulization Q4H PRN    carvedilol (COREG) tablet 12.5 mg  12.5 mg Oral BID WITH MEALS    isosorbide mononitrate (ISMO, MONOKET) tablet 20 mg  20 mg Oral BID    magic mouthwash cpd (without sucralfate)  5 mL Oral QID    nicotine (NICODERM CQ) 21 mg/24 hr patch 1 Patch  1 Patch TransDERmal Q24H    oxyCODONE-acetaminophen (PERCOCET) 5-325 mg per tablet 1 Tab  1 Tab Oral Q4H PRN    tamsulosin (FLOMAX) capsule 0.4 mg  0.4 mg Oral DAILY    ipratropium (ATROVENT) 0.02 % nebulizer solution 0.5 mg  0.5 mg Nebulization Q6H RT    enoxaparin (LOVENOX) injection 40 mg  40 mg SubCUTAneous DAILY    ondansetron (ZOFRAN) injection 4 mg  4 mg IntraVENous Q6H PRN    zolpidem (AMBIEN) tablet 5 mg  5 mg Oral QHS PRN    cloNIDine (CATAPRES) 0.2 mg/24 hr patch 1 Patch  1 Patch TransDERmal ONCE    glucose chewable tablet 16 g  4 Tab Oral PRN    dextrose (D50W) injection syrg 12.5-25 g  12.5-25 g IntraVENous PRN    glucagon (GLUCAGEN) injection 1 mg  1 mg IntraMUSCular PRN    cefepime (MAXIPIME) 2 g in 0.9% sodium chloride (MBP/ADV) 100 mL  2 g IntraVENous Q12H       Labs:  ABG Recent Labs      10/09/17   1032   PHI  7.196*   PCO2I  51.0*   PO2I  96   HCO3I  19.8*   SO2I  95   FIO2I  1.0        CBC Recent Labs      10/10/17   0551  10/09/17   1354  10/09/17   0232   WBC  12.7*  8.0  4.8   HGB  9.8*  9.7*  9.5*   HCT  29.5*  29.9*  29.0*   PLT  84*  70*  66*   MCV  91.9  92.6  91.8   MCH  30.5  30.0  61.2        Metabolic  Panel Recent Labs      10/10/17   0551  10/09/17   1029  10/09/17   0232  10/08/17 0429   NA  152*  144  149*  153*   K  4.0  3.2*  3.3*  3.7   CL  121*  115*  119*  122*   CO2  18*  21  21  21   GLU  241*  414*  355*  249*   BUN  52*  47*  51*  44*   CREA  1.83*  1.98*  1.97*  2.11*   CA  8.1*  8.1*  8.3*  8.2*   MG   --   2.5*   --   2.1   PHOS   --   3.6   --   2.2*   ALB  1.6*  1.7*  1.7*  2.0*   SGOT  12*  14*  11*  9*   ALT  16  17  17  15   INR  1.1   --    --    --         Pertinent Labs                DUNCAN Rosenthal  10/10/2017

## 2017-10-10 NOTE — DIABETES MGMT
DTC Progress Note    Recommendations/ Comments: Chart reviewed due to hyperglycemia. Blood sugars >300 and pt has required 15 units of correction insulin over the last 24 hours. Noted pt is currently NPO but D5 is running. If appropriate, please consider:  1. Changing correction scale to Humalog   2. Starting Lantus 20 units    Chart reviewed on Neftaly Li. Patient is a 69 y.o. male with known diabetes on Amaryl 4 mg at home. A1c:   No results found for: HBA1C, HGBE8, XPO1XNSR, CVV5KPMY    Recent Glucose Results:   Lab Results   Component Value Date/Time     (H) 10/10/2017 05:51 AM    GLUCPOC 215 (H) 10/10/2017 12:00 PM    GLUCPOC 244 (H) 10/10/2017 05:48 AM    GLUCPOC 142 (H) 10/09/2017 10:26 PM        Lab Results   Component Value Date/Time    Creatinine 1.83 10/10/2017 05:51 AM     Estimated Creatinine Clearance: 34.3 mL/min (based on Cr of 1.83). Active Orders   Diet    DIET NPO        PO intake:   Patient Vitals for the past 72 hrs:   % Diet Eaten   10/08/17 1324 0 %   10/08/17 0825 0 %       Current hospital DM medication: correction scale Regular insulin, high sensitivity. Will continue to follow as needed.     Thank you  Janice Rowe RD, CDE

## 2017-10-11 ENCOUNTER — APPOINTMENT (OUTPATIENT)
Dept: GENERAL RADIOLOGY | Age: 77
DRG: 157 | End: 2017-10-11
Attending: INTERNAL MEDICINE
Payer: MEDICARE

## 2017-10-11 LAB
ALBUMIN SERPL-MCNC: 1.5 G/DL (ref 3.5–5)
ALBUMIN/GLOB SERPL: 0.3 {RATIO} (ref 1.1–2.2)
ALP SERPL-CCNC: 93 U/L (ref 45–117)
ALT SERPL-CCNC: 16 U/L (ref 12–78)
ANION GAP SERPL CALC-SCNC: 9 MMOL/L (ref 5–15)
AST SERPL-CCNC: 14 U/L (ref 15–37)
BASOPHILS # BLD: 0 K/UL (ref 0–0.1)
BASOPHILS NFR BLD: 0 % (ref 0–1)
BILIRUB SERPL-MCNC: 0.4 MG/DL (ref 0.2–1)
BUN SERPL-MCNC: 48 MG/DL (ref 6–20)
BUN/CREAT SERPL: 26 (ref 12–20)
CALCIUM SERPL-MCNC: 8.8 MG/DL (ref 8.5–10.1)
CHLORIDE SERPL-SCNC: 119 MMOL/L (ref 97–108)
CO2 SERPL-SCNC: 21 MMOL/L (ref 21–32)
CREAT SERPL-MCNC: 1.84 MG/DL (ref 0.7–1.3)
DIFFERENTIAL METHOD BLD: ABNORMAL
EOSINOPHIL # BLD: 0 K/UL (ref 0–0.4)
EOSINOPHIL NFR BLD: 0 % (ref 0–7)
ERYTHROCYTE [DISTWIDTH] IN BLOOD BY AUTOMATED COUNT: 13.9 % (ref 11.5–14.5)
GLOBULIN SER CALC-MCNC: 5.5 G/DL (ref 2–4)
GLUCOSE BLD STRIP.AUTO-MCNC: 167 MG/DL (ref 65–100)
GLUCOSE BLD STRIP.AUTO-MCNC: 235 MG/DL (ref 65–100)
GLUCOSE BLD STRIP.AUTO-MCNC: 241 MG/DL (ref 65–100)
GLUCOSE BLD STRIP.AUTO-MCNC: 244 MG/DL (ref 65–100)
GLUCOSE SERPL-MCNC: 284 MG/DL (ref 65–100)
HCT VFR BLD AUTO: 33.6 % (ref 36.6–50.3)
HGB BLD-MCNC: 11.3 G/DL (ref 12.1–17)
LYMPHOCYTES # BLD: 0.9 K/UL (ref 0.8–3.5)
LYMPHOCYTES NFR BLD: 5 % (ref 12–49)
MCH RBC QN AUTO: 31 PG (ref 26–34)
MCHC RBC AUTO-ENTMCNC: 33.6 G/DL (ref 30–36.5)
MCV RBC AUTO: 92.3 FL (ref 80–99)
MONOCYTES # BLD: 0.4 K/UL (ref 0–1)
MONOCYTES NFR BLD: 2 % (ref 5–13)
NEUTS BAND NFR BLD MANUAL: 5 % (ref 0–6)
NEUTS SEG # BLD: 16.3 K/UL (ref 1.8–8)
NEUTS SEG NFR BLD: 88 % (ref 32–75)
NRBC # BLD: 0.1 K/UL (ref 0–0.01)
NRBC BLD-RTO: 0.6 PER 100 WBC
PLATELET # BLD AUTO: 98 K/UL (ref 150–400)
POTASSIUM SERPL-SCNC: 3.4 MMOL/L (ref 3.5–5.1)
PROT SERPL-MCNC: 7 G/DL (ref 6.4–8.2)
RBC # BLD AUTO: 3.64 M/UL (ref 4.1–5.7)
RBC MORPH BLD: ABNORMAL
SERVICE CMNT-IMP: ABNORMAL
SODIUM SERPL-SCNC: 149 MMOL/L (ref 136–145)
WBC # BLD AUTO: 17.6 K/UL (ref 4.1–11.1)
WBC MORPH BLD: ABNORMAL
WBC NRBC COR # BLD: ABNORMAL 10*3/UL

## 2017-10-11 PROCEDURE — 85025 COMPLETE CBC W/AUTO DIFF WBC: CPT | Performed by: HOSPITALIST

## 2017-10-11 PROCEDURE — 74011250636 HC RX REV CODE- 250/636: Performed by: INTERNAL MEDICINE

## 2017-10-11 PROCEDURE — 92611 MOTION FLUOROSCOPY/SWALLOW: CPT

## 2017-10-11 PROCEDURE — 74230 X-RAY XM SWLNG FUNCJ C+: CPT

## 2017-10-11 PROCEDURE — 74011000250 HC RX REV CODE- 250: Performed by: INTERNAL MEDICINE

## 2017-10-11 PROCEDURE — 74011250636 HC RX REV CODE- 250/636: Performed by: HOSPITALIST

## 2017-10-11 PROCEDURE — 74011250637 HC RX REV CODE- 250/637: Performed by: HOSPITALIST

## 2017-10-11 PROCEDURE — 77010033678 HC OXYGEN DAILY

## 2017-10-11 PROCEDURE — 82962 GLUCOSE BLOOD TEST: CPT

## 2017-10-11 PROCEDURE — 92526 ORAL FUNCTION THERAPY: CPT

## 2017-10-11 PROCEDURE — 65270000032 HC RM SEMIPRIVATE

## 2017-10-11 PROCEDURE — 74011000258 HC RX REV CODE- 258: Performed by: INTERNAL MEDICINE

## 2017-10-11 PROCEDURE — 36415 COLL VENOUS BLD VENIPUNCTURE: CPT | Performed by: HOSPITALIST

## 2017-10-11 PROCEDURE — 74011636637 HC RX REV CODE- 636/637: Performed by: HOSPITALIST

## 2017-10-11 PROCEDURE — 74011250637 HC RX REV CODE- 250/637: Performed by: INTERNAL MEDICINE

## 2017-10-11 PROCEDURE — 94640 AIRWAY INHALATION TREATMENT: CPT

## 2017-10-11 PROCEDURE — 80053 COMPREHEN METABOLIC PANEL: CPT | Performed by: HOSPITALIST

## 2017-10-11 RX ORDER — HYDRALAZINE HYDROCHLORIDE 20 MG/ML
10 INJECTION INTRAMUSCULAR; INTRAVENOUS ONCE
Status: COMPLETED | OUTPATIENT
Start: 2017-10-11 | End: 2017-10-11

## 2017-10-11 RX ORDER — POTASSIUM CHLORIDE 14.9 MG/ML
10 INJECTION INTRAVENOUS
Status: COMPLETED | OUTPATIENT
Start: 2017-10-11 | End: 2017-10-11

## 2017-10-11 RX ADMIN — DIPHENHYDRAMINE HYDROCHLORIDE 5 ML: 12.5 SOLUTION ORAL at 12:52

## 2017-10-11 RX ADMIN — CASTOR OIL AND BALSAM, PERU: 788; 87 OINTMENT TOPICAL at 18:01

## 2017-10-11 RX ADMIN — POTASSIUM CHLORIDE 10 MEQ: 200 INJECTION, SOLUTION INTRAVENOUS at 12:42

## 2017-10-11 RX ADMIN — IPRATROPIUM BROMIDE 0.5 MG: 0.5 SOLUTION RESPIRATORY (INHALATION) at 13:04

## 2017-10-11 RX ADMIN — NITROGLYCERIN 1 INCH: 20 OINTMENT TOPICAL at 17:55

## 2017-10-11 RX ADMIN — HYDRALAZINE HYDROCHLORIDE 10 MG: 20 INJECTION INTRAMUSCULAR; INTRAVENOUS at 09:08

## 2017-10-11 RX ADMIN — INSULIN LISPRO 2 UNITS: 100 INJECTION, SOLUTION INTRAVENOUS; SUBCUTANEOUS at 06:25

## 2017-10-11 RX ADMIN — POTASSIUM CHLORIDE 10 MEQ: 200 INJECTION, SOLUTION INTRAVENOUS at 13:48

## 2017-10-11 RX ADMIN — FENTANYL CITRATE 25 MCG: 50 INJECTION, SOLUTION INTRAMUSCULAR; INTRAVENOUS at 06:35

## 2017-10-11 RX ADMIN — ENOXAPARIN SODIUM 40 MG: 100 INJECTION SUBCUTANEOUS at 08:11

## 2017-10-11 RX ADMIN — IPRATROPIUM BROMIDE 0.5 MG: 0.5 SOLUTION RESPIRATORY (INHALATION) at 08:01

## 2017-10-11 RX ADMIN — IPRATROPIUM BROMIDE 0.5 MG: 0.5 SOLUTION RESPIRATORY (INHALATION) at 00:53

## 2017-10-11 RX ADMIN — DIPHENHYDRAMINE HYDROCHLORIDE 5 ML: 12.5 SOLUTION ORAL at 08:12

## 2017-10-11 RX ADMIN — FENTANYL CITRATE 25 MCG: 50 INJECTION, SOLUTION INTRAMUSCULAR; INTRAVENOUS at 18:24

## 2017-10-11 RX ADMIN — CEFEPIME 2 G: 2 INJECTION, POWDER, FOR SOLUTION INTRAVENOUS at 23:07

## 2017-10-11 RX ADMIN — LEVOFLOXACIN 250 MG: 5 INJECTION, SOLUTION INTRAVENOUS at 14:43

## 2017-10-11 RX ADMIN — CASTOR OIL AND BALSAM, PERU: 788; 87 OINTMENT TOPICAL at 08:12

## 2017-10-11 RX ADMIN — IPRATROPIUM BROMIDE 0.5 MG: 0.5 SOLUTION RESPIRATORY (INHALATION) at 20:38

## 2017-10-11 RX ADMIN — POTASSIUM CHLORIDE 10 MEQ: 200 INJECTION, SOLUTION INTRAVENOUS at 14:43

## 2017-10-11 RX ADMIN — POLYVINYL ALCOHOL 1 DROP: 14 SOLUTION/ DROPS OPHTHALMIC at 08:20

## 2017-10-11 RX ADMIN — CEFEPIME 2 G: 2 INJECTION, POWDER, FOR SOLUTION INTRAVENOUS at 12:41

## 2017-10-11 RX ADMIN — POTASSIUM CHLORIDE 10 MEQ: 200 INJECTION, SOLUTION INTRAVENOUS at 15:26

## 2017-10-11 RX ADMIN — INSULIN LISPRO 2 UNITS: 100 INJECTION, SOLUTION INTRAVENOUS; SUBCUTANEOUS at 12:40

## 2017-10-11 RX ADMIN — DEXTROSE MONOHYDRATE 75 ML/HR: 5 INJECTION, SOLUTION INTRAVENOUS at 18:28

## 2017-10-11 RX ADMIN — DEXTROSE MONOHYDRATE 75 ML/HR: 5 INJECTION, SOLUTION INTRAVENOUS at 03:12

## 2017-10-11 NOTE — PROGRESS NOTES
10/11/17 1511   Vitals   Temp 97.3 °F (36.3 °C)   Temp Source Oral   Pulse (Heart Rate) 82   Heart Rate Source Monitor   Resp Rate 20   O2 Sat (%) 95 %   Level of Consciousness Alert   /82   MAP (Calculated) 113   BP 1 Location Left arm   BP 1 Method Automatic   BP Patient Position At rest   MEWS Score 1   Per Dr. Gauri Rangel, no concern over bp.  Continue to monitor

## 2017-10-11 NOTE — PROGRESS NOTES
Hospitalist Progress Note  Marletta Cheadle, MD  Answering service: [phone] from in house phone        Date of Service:  10/10/2017  NAME:  Liu Arias  :  1940  MRN:  [id number]      Admission Summary:   S/p code blue for unresponsiveness,hypotension,hypoxia. He was intubated and moved to ICU  Does not seem CPR done per chart. Interval history / Subjective:     Patient extubated and back to 6E. Awake,but altered. Assessment & Plan:   Unresponsiveness,hypotension,hypoxia due to over narcosis,poor oral intake. -Extubated and out of ICU.hemodynamically stable    Febrile neutropenia:continue antibiotics. Afebrile. Cultures negative. Acute kidney injury or chronic kidney disease, stage III due to ECF contraction as a result of poor intake:cpntinue iv fluids   Hypoglycemia secondary to decreased p.o. intake. On IV dextrose   Hypernatremia. hypovolemic. COntinue hypotonic fluids. Head and neck cancer, status post cycle 1 chemotherapy. Dysphagia due to mucositis: NPO.ongoing speech eval.      Hospital Problems  Never Reviewed          Codes Class Noted POA    [facility] ICD-10-CM: C02.9  ICD-9-CM: 141.9  10/6/2017 Unknown        Mucositis ICD-10-CM: K12.30  ICD-9-CM: 528.00  10/6/2017 Unknown                Review of Systems:   Review of systems not obtained due to patient factors. Vital Signs:    Last 24hrs VS reviewed since prior progress note.  Most recent are:  Visit Vitals    BP (!) 160/98 (BP 1 Location: Right arm, BP Patient Position: At rest)    Pulse 81    Temp 97.3 °F (36.3 °C)    Resp 18    Ht 5' 9\" (1.753 m)    Wt 82.8 kg (182 lb 9.6 oz)    SpO2 98%    BMI 26.97 kg/m2         Intake/Output Summary (Last 24 hours) at 10/10/17 2113  Last data filed at 10/10/17 1100   Gross per 24 hour   Intake             1100 ml   Output              765 ml   Net              335 ml        Physical Examination: Constitutional: Awake,weak,   ENT:  Oral mucous moist, oropharynx benign. Neck supple,    Resp:  Audible secretions,CTA bilaterally. CV:  Regular rhythm, normal rate, no murmurs, gallops, rubs    GI:  Soft, non distended, non tender. normoactive bowel sounds, no hepatosplenomegaly     Musculoskeletal:  No edema, warm, 2+ pulses throughout    Neurologic:  Moves all extremities. AAOx3, CN II-XII reviewed            Data Review:    Review and/or order of clinical lab test  Review and/or order of tests in the radiology section of CPT  Review and/or order of tests in the medicine section of [facility]      Labs:     Recent Labs      10/10/17   0551  10/09/17   1354   WBC  12.7*  8.0   HGB  9.8*  9.7*   HCT  29.5*  29.9*   PLT  84*  70*     Recent Labs      10/10/17   0551  10/09/17   1029  10/09/17   0232  10/08/17   0429   NA  152*  144  149*  153*   K  4.0  3.2*  3.3*  3.7   CL  121*  115*  119*  122*   CO2  18*  21  21  21   BUN  52*  47*  51*  44*   CREA  1.83*  1.98*  1.97*  2.11*   GLU  241*  414*  355*  249*   CA  8.1*  8.1*  8.3*  8.2*   MG   --   2.5*   --   2.1   PHOS   --   3.6   --   2.2*     Recent Labs      10/10/17   0551  10/09/17   1029  10/09/17   0232   SGOT  12*  14*  11*   ALT  16  17  17   AP  74  59  52   TBILI  0.4  0.4  0.5   TP  6.5  6.5  6.2*   ALB  1.6*  1.7*  1.7*   GLOB  4.9*  4.8*  4.5*     Recent Labs      10/10/17   0551   INR  1.1   PTP  11.3*      No results for input(s): FE, TIBC, PSAT, FERR in the last 72 hours. Lab Results   Component Value Date/Time    Folate 13.8 09/30/2017 03:43 AM      No results for input(s): PH, PCO2, PO2 in the last 72 hours.   Recent Labs      10/09/17   1029   CPK  101   TROIQ  0.15*     No results found for: CHOL, CHOLX, CHLST, CHOLV, HDL, LDL, LDLC, DLDLP, TGLX, TRIGL, TRIGP, CHHD, CHHDX  Lab Results   Component Value Date/Time    Glucose (POC) 258 10/10/2017 04:38 PM    Glucose (POC) 215 10/10/2017 12:00 PM    Glucose (POC) 244 10/10/2017 05:48 AM    Glucose (POC) 142 10/09/2017 10:26 PM    Glucose (POC) 182 10/09/2017 04:08 PM     Lab Results   Component Value Date/Time    Color YELLOW/STRAW 10/09/2017 12:13 PM    Appearance TURBID 10/09/2017 12:13 PM    Specific gravity 1.025 10/09/2017 12:13 PM    pH (UA) 6.0 10/09/2017 12:13 PM    Protein 100 10/09/2017 12:13 PM    Glucose 500 10/09/2017 12:13 PM    Ketone NEGATIVE  10/09/2017 12:13 PM    Bilirubin NEGATIVE  10/09/2017 12:13 PM    Urobilinogen 0.2 10/09/2017 12:13 PM    Nitrites NEGATIVE  10/09/2017 12:13 PM    Leukocyte Esterase NEGATIVE  10/09/2017 12:13 PM    Epithelial cells MODERATE 10/09/2017 12:13 PM    Bacteria 3+ 10/09/2017 12:13 PM    WBC 5-10 10/09/2017 12:13 PM    RBC 5-10 10/09/2017 12:13 PM         Medications Reviewed:     Current Facility-Administered Medications   Medication Dose Route Frequency    insulin regular (NOVOLIN R, HUMULIN R) injection   SubCUTAneous Q6H    dextrose 5% infusion  75 mL/hr IntraVENous CONTINUOUS    polyvinyl alcohol (LIQUIFILM TEARS) 1.4 % ophthalmic solution 1 Drop  1 Drop Both Eyes PRN    balsam peru-castor oil (VENELEX)  mg/gram ointment   Topical BID    fentaNYL citrate (PF) injection 25 mcg  25 mcg IntraVENous Q4H PRN    levoFLOXacin (LEVAQUIN) 250 mg in D5W IVPB  250 mg IntraVENous Q24H    acetaminophen (TYLENOL) tablet 500 mg  500 mg Oral Q6H PRN    albuterol (PROVENTIL VENTOLIN) nebulizer solution 2.5 mg  2.5 mg Nebulization Q4H PRN    carvedilol (COREG) tablet 12.5 mg  12.5 mg Oral BID WITH MEALS    isosorbide mononitrate (ISMO, MONOKET) tablet 20 mg  20 mg Oral BID    magic mouthwash cpd (without sucralfate)  5 mL Oral QID    nicotine (NICODERM CQ) 21 mg/24 hr patch 1 Patch  1 Patch TransDERmal Q24H    oxyCODONE-acetaminophen (PERCOCET) 5-325 mg per tablet 1 Tab  1 Tab Oral Q4H PRN    tamsulosin (FLOMAX) capsule 0.4 mg  0.4 mg Oral DAILY    ipratropium (ATROVENT) 0.02 % nebulizer solution 0.5 mg  0.5 mg Nebulization Q6H RT    enoxaparin (LOVENOX) injection 40 mg  40 mg SubCUTAneous DAILY    ondansetron (ZOFRAN) injection 4 mg  4 mg IntraVENous Q6H PRN    zolpidem (AMBIEN) tablet 5 mg  5 mg Oral QHS PRN    cloNIDine (CATAPRES) 0.2 mg/24 hr patch 1 Patch  1 Patch TransDERmal ONCE    glucose chewable tablet 16 g  4 Tab Oral PRN    dextrose (D50W) injection syrg 12.5-25 g  12.5-25 g IntraVENous PRN    glucagon (GLUCAGEN) injection 1 mg  1 mg IntraMUSCular PRN    cefepime (MAXIPIME) 2 g in 0.9% sodium chloride (MBP/ADV) 100 mL  2 g IntraVENous Q12H     ______________________________________________________________________  EXPECTED LENGTH OF STAY: 5d 9h  ACTUAL LENGTH OF STAY:          4                 Jason Blake MD

## 2017-10-11 NOTE — ROUTINE PROCESS
Bedside and Verbal shift change report given to Jeffrey Siu (oncoming nurse) by Kiley Shine (offgoing nurse). Report included the following information SBAR, Kardex, Intake/Output, MAR, Accordion and Recent Results.

## 2017-10-11 NOTE — PROGRESS NOTES
Problem: Dysphagia (Adult)  Goal: *Acute Goals and Plan of Care (Insert Text)  Speech pathology goals  Initiated 10/10/2017  1. Patient will participate in re-evaluation of swallow function daily MET  2. Patient will participate in MBS as clinically indicated MET  3. Added 10/11/2017 Patient will complete pharyngeal strengthening exercises x20 per session given mod cues within 7 days   [address] STUDY  Patient: Lisy Olvera (53 y.o. male)  Date: 10/11/2017  Primary Diagnosis: Tongue cancer ([facility] [address]  Mucositis        Precautions: swallow         ASSESSMENT :  Based on the objective data described below, the patient presents with severe oropharyngeal dysphagia. Oral dysphagia characterized by poor oral control with mod anterior spillage of thin liquids, disorganized oral prep, and premature spillage. Patient with absent posterior propulsion of purees requiring liquid wash for minimally improved posterior propulsion. Patient with max lingual residue with puree that SLP removed with spoon after MBS completed. Pharyngeal dysphagia characterized by mild-moderately delayed swallow initiation with pooling in the valleculae and pyriform sinuses. Patient also with minimal movement of all pharyngeal structures, including no anterior hyoid excursion, no epiglottic inversion, minimal PES opening, decreased laryngeal elevation/closure, and decreased pharyngeal stripping wave. This resulted in max vallecular and pyriform sinus residue with thin liquids and purees with minimal additional clearance with additional swallows. Patient with aspiration of thin liquids and purees during the swallow secondary to reduced laryngeal closure and after the swallow secondary to max pharyngeal residue. Patient with spontaneous throat clear to aspiration that was ineffective in clearing aspirated material. With cued strong cough, patient cleared aspiration/penetration visible in frame.       Patient is not appropriate for PO intake at this time secondary to severe oropharyngeal dysphagia. Recommend long term alternative means of nutrition. Also recommend intensive SLP treatment to improve pharyngeal strength both in the hospital and at next level of care upon discharge. Would consider allowing ice chips ONLY after oral care to reduce disuse atrophy, improve oral moisture, and provide comfort. Patient will benefit from skilled intervention to address the above impairments. Patients rehabilitation potential is considered to be Fair  Factors which may influence rehabilitation potential include:   [ ]              None noted  [X]              Mental ability/status  [X]              Medical condition  [ ]              Home/family situation and support systems  [X]              Safety awareness  [ ]              Pain tolerance/management  [ ]              Other:        PLAN :  Recommendations and Planned Interventions:  --NPO except minimal ice chips with RN ONLY after oral care  --Long-term alternative means of nutrition  --SLP will follow for intensive SLP treatment to improve pharyngeal swallow function  Frequency/Duration: Patient will be followed by speech-language pathology 4 times a week to address goals. Discharge Recommendations: Home Health and Outpatient       SUBJECTIVE:   Patient agreeable to MBS. OBJECTIVE:       Past Medical History:   Diagnosis Date    Cancer ([facility] [address]       tongue with mets     [facility]      Hypercholesteremia      Hypertension     History reviewed. No pertinent surgical history.   Prior Level of Function/Home Situation:   Home Situation  Home Environment: Private residence  # Steps to Enter: 0  One/Two Story Residence: One story  Living Alone: No  Support Systems: Family member(s)  Patient Expects to be Discharged to[de-identified] Private residence  Current DME Used/Available at Home: None  Diet prior to admission: mechanical soft/thin per patient report  Current Diet:  NPO   Radiologist:  Cook  Film Views: Lateral;Fluoro  Patient Position: upright in hausted chair      Trial 1: Trial 2:   Consistency Presented: Thin liquid Consistency Presented: Puree   How Presented: SLP-fed/presented;Spoon;Self-fed/presented;Cup/sip How Presented: SLP-fed/presented;Spoon         Bolus Acceptance: No impairment Bolus Acceptance: No impairment   Bolus Formation/Control: Impaired: Anterior;Spillage;Premature spillage;Delayed;Poor Bolus Formation/Control: Impaired: Delayed;Poor; Incomplete   Propulsion: Delayed (# of seconds); Discoordination Propulsion: Delayed (# of seconds); Discoordination; Other (comment) (poor, minimal, required liquid wash)   Oral Residue: Lingual Oral Residue: Lingual;Other (comment) (max)   Initiation of Swallow: Triggered at pyriform sinus(es) Initiation of Swallow: Triggered at pyriform sinus(es)   Timing: Pooling 1-5 sec Timing: Pooling 1-5 sec   Penetration: During swallow; After swallow; To cords Penetration: During swallow; After swallow; To cords   Aspiration/Timing: During; After Aspiration/Timing: During; After   Pharyngeal Clearance: Vallecular residue;Pyriform residue ;10-50%;Greater than 50% Pharyngeal Clearance: Vallecular residue;Pyriform residue ; Less than 10%;10-50%   Attempted Modifications: Double swallow;Spoon;Cup/sip;Cup/gulp Attempted Modifications: Double swallow; Alternate liquids/solids   Effective Modifications: None Effective Modifications: None   Cues for Modifications: Minimal Cues for Modifications: Maximum               Decreased Tongue Base Retraction?: Yes  Laryngeal Elevation: Inadequate epiglottic inversion;Minimal movement of larynx/epiglottis; Incomplete laryngeal closure; Reduced excursion with laryngeal vestibule gap  Aspiration/Penetration Score: 7 (Aspiration-Contrast passes cords/glottis and is not ejected despite effort)  Pharyngeal Symmetry: Not assessed  Pharyngeal-Esophageal Segment: Decreased relaxation of upper esophageal segment  Pharyngeal Dysfunction: Crico-pharyngeal dysfunction;Decreased tongue base retraction;Decreased strength;Decreased elevation/closure;Decreased pharyngeal wall constriction     Oral Phase Severity: Severe  Pharyngeal Phase Severity: Severe  NOMS:   The NOMS functional outcome measure was used to quantify this patient's level of swallowing impairment. Based on the NOMS, the patient was determined to be at level 1 for swallow function      G Codes: In compliance with CMSs Claims Based Outcome Reporting, the following G-code set was chosen for this patient based the use of the NOMS functional outcome to quantify this patient's level of swallowing impairment. Using the NOMS, the patient was determined to be at level 1 for swallow function which correlates with the CN= 100% level of severity. Based on the objective assessment provided within this note, the current, goal, and discharge g-codes are as follows:     Swallow  Swallowing:   Swallow Current Status CN= 100%   Swallow Goal Status CM= 80-99%         NOMS Swallowing Levels:  Level 1 (CN): NPO  Level 2 (CM): NPO but takes consistency in therapy  Level 3 (CL): Takes less than 50% of nutrition p.o. and continues with nonoral feedings; and/or safe with mod cues; and/or max diet restriction  Level 4 (CK): Safe swallow but needs mod cues; and/or mod diet restriction; and/or still requires some nonoral feeding/supplements  Level 5 (CJ): Safe swallow with min diet restriction; and/or needs min cues  Level 6 (CI): Independent with p.o.; rare cues; usually self cues; may need to avoid some foods or needs extra time  Level 7 ([address] [place] [address]): Independent for all p.o.  SOL. (2003). National Outcomes Measurement System (NOMS): Adult Speech-Language Pathology User's Guide. COMMUNICATION/EDUCATION:   The patients plan of care including findings from MBS, recommendations, planned interventions, and recommended diet changes were discussed with: Registered Nurse.      [X] Patient/family have participated as able in goal setting and plan of care. [X]  Patient/family agree to work toward stated goals and plan of care. [ ]  Patient understands intent and goals of therapy, but is neutral about his/her participation. [ ]  Patient is unable to participate in goal setting and plan of care.      Thank you for this referral.  Zoran Macias, SLP  Time Calculation: 20 mins

## 2017-10-11 NOTE — PROGRESS NOTES
Bedside shift change report given to Melissa Aragon, RN (oncoming nurse) by Marin Wagoner, LAURI (offgoing nurse). Report included the following information SB[facility], Kardex and [facility]     5839- Paged Dr. Jayant King and Dr. Rony Gibbs regarding patient's BP and NPO status. Waiting for page back    3912- Repaged because of no response. Waiting for page back. 5259- Per Dr. Jayant King, order Hydralizine 10mg IV Once. Will determine any additional interventions after assessing patient. 1500- Paged [facility] to notify MD on call for Dr. Rony Gibbs that patient and family wish to pursue palliative route instead of any interventions. 56- Dr. Springer Oar responded to page, per MD will discuss code status, plan of care with family and patient.

## 2017-10-11 NOTE — PROGRESS NOTES
NUTRITION   RD Screen      Pt seen for:      [x]  MST for poor PO []   MD Consult    []        Supplements  []   PO intake check   []        Food Allergies  []   Food Preferences/tolerances    []        Rescreen  []   Education    []        Diet order clarification []   Other   []  LOS                          Nutrition Prescription:     No changes or new recommendations at this time   PO for Comfort Only with RN in room     Assessment:   Information obtained from:   patient, child(julien) and RN    Pt returned from [facility] [place], [facility] found severe aspiration risk with poor ability to tolerate any consistency and very poor strength. Pt and daughters state he does not want any tubes and \"signed all that paperwork yesterday\". Defined a PEG tube for patient and he was familiar and reiterated he did not desire nutrition support. Pt remains NPO for safety. Pt will benefit from palliative medicine consult. Family recognizes that this may interfere with ability to provide comfort with oral meds and meet hydration needs. Nutrition is in agreement with end of life decisions however, needs palliative medicine plan at this point. Cultural, Advent and ethnic food preferences:   [x]  None   []  Identified and addressed    Diet:  NPO     PO Intake: []           Good     []           Fair      [x]           Poor     Patient Vitals for the past 100 hrs:   % Diet Eaten   10/08/17 1324 0 %   10/08/17 0825 0 %   10/07/17 1229 0 %   NPO    Wt Readings from Last 5 Encounters:   10/10/17 82.8 kg (182 lb 9.6 oz)   10/03/17 84.8 kg (187 lb)   ]    Body mass index is 26.97 kg/(m^2).     Skin: intact  BM: 10/11  ABD: hypoactive, soft, intact    Estimated Daily Nutrition Requirements:  Kcals/day:   1900 kcal (23 harrison/kg of current wt)  Protein:   83-99g (1-1.2 g/kg of current wt)  Fluid:   1900mL/d (1 mL/kcal of est needs)      Based On:  admit wt   Weight Used:   82.8 kg     Wt Readings from Last 3 Encounters:   10/10/17 82.8 kg (182 lb 9.6 oz) 10/03/17 84.8 kg (187 lb)       Weight Changes:   [x]   Loss (as expected with worsening Ca + NPO status & inability to tolerate PO)  []   Gain  []   Stable    Nutrition Problems Identified  []     None  []     Specified food preferences   []     Dislikes supplements              []     Allergies  [x]     Difficulty chewing/swallowing (refuses NS)    [x]     Dentition   []     Nausea/Vomiting  []    Constipation  []    Diarrhea    Nutrition Diagnosis:      Inability to tolerate nutrient needs related to inability to manipulate or swallow PO as evidence by results of MBS with SLP report, pt aspirated thins and pudding consistencies, poor ability to clear aspirated materials, weakness and worsening condition.      Intervention:   []    Obtained/adjusted food preferences/tolerances and/or snacks options   []    Dislikes supplements will try a substitution   []    Modify diet for food allergies  []    Adjust texture due to difficulty chewing   []    Encourage Fresh Fruit, Activia yogurt, fluid   [x]    Educated patient - declines Nutrition Support & what that means for End of Life care  []    Rescreen per screening protocol  []    Add Supplements    Goal: Pt to receive PO for comfort only    Monitoring/Evaluation:   Education & Discharge Needs  [] Nutrition related discharge needs addressed:   [] Supplements (on d/c instruction &/or coupons provided)    [x] Education   [] No nutrition related discharge needs at this time    Rescreen: [x]  At Nutrition Risk  - not able to meet needs, comfort measures needed         []  Not at Nutrition Risk, rescreen per screening protocol    Kitty Santiago, MS, RD, CDE

## 2017-10-11 NOTE — PROGRESS NOTES
Problem: Dysphagia (Adult)  Goal: *Acute Goals and Plan of Care (Insert Text)  Speech pathology goals  Initiated 10/10/2017  1. Patient will participate in re-evaluation of swallow function daily  2. Patient will participate in [address] as clinically indicated   [id number] [address] TREATMENT  Patient: Sarath Jerez (66 y.o. male)  Date: 10/11/2017  Diagnosis: Tongue cancer ([facility] [address]  Mucositis Mucositis       Precautions: swallow        ASSESSMENT:  Patient with no improvement in swallow function this date. Continues with moderate-severe oropharyngeal dysphagia characterized by impaired bolus acceptance, minimal bolus manipulation, delayed posterior propulsion, delayed swallow initiation, weak hyolaryngeal elevation/excursion, and multiple swallows with single ice chip suggestive of pharyngeal residue. Patient with weak, wet cough after single ice chip. Recommend MBS to fully assess pharyngeal phase of swallow and determine safest diet. ?If patient will require alternative means of nutrition. Called Dr. Shanell Busch' office to discuss, however RN reported Dr. Shanell Busch is off today. Left message for Dr. Glenn Hannah, who RN reported is covering Dr. Shanell Busch' patients. Of note, patient refused MBS reporting, \"I'm going home today. \" Despite education regarding rationale for MBS, dysphagia, and aspiration risk, patient continued to refuse. Hopeful that we can convince patient to participate in [address] if he remains in hospital.    Addendum: Discussed case with Dr. Glenn Hannah who is in agreement with completing MBS today. MBS scheduled for 11:00. Discussed with patient who agreed to complete MBS at 11:00, and patient reported he would still be in hospital at that time.       Progression toward goals:  [ ]         Improving appropriately and progressing toward goals  [X]         Improving slowly and progressing toward goals  [ ]         Not making progress toward goals and plan of care will be adjusted       PLAN:  Recommendations and Planned Interventions:  --Continue NPO  --Frequent oral care  --SLP to complete MBS today if Dr. Silvia Mitchell and patient in agreement. ?if patient will require alternative means of nutrition  Patient continues to benefit from skilled intervention to address the above impairments. Continue treatment per established plan of care. Discharge Recommendations: To Be Determined       SUBJECTIVE:   Patient stated I'm going home.       OBJECTIVE:   Cognitive and Communication Status:  Neurologic State: Alert  Orientation Level: Oriented to person  Cognition: Follows commands  Perception: Appears intact  Perseveration: Perseverates during conversation (perseverates on going home)  Safety/Judgement: Not assessed  Dysphagia Treatment:  Oral Assessment:  Oral Assessment  Dentition: Natural;Limited  Oral Hygiene: moist oral mucosa, irritated/bloody mucosa consistent with mucositis  P.O. Trials:  Patient Position: upright in chair  Vocal quality prior to P.O.: Low volume;Hoarse  Consistency Presented: Ice chips  How Presented: SLP-fed/presented;Spoon     Bolus Acceptance: Impaired  Bolus Formation/Control: Impaired  Type of Impairment: Delayed; Incomplete;Poor  Propulsion: Delayed (# of seconds)  Oral Residue: None  Initiation of Swallow: Delayed (# of seconds)  Laryngeal Elevation: Decreased;Weak  Aspiration Signs/Symptoms: Weak cough  Pharyngeal Phase Characteristics: Multiple swallows; Suspected pharyngeal residue  Effective Modifications: None  Cues for Modifications: None                       Pain:  Pain Scale 1: Numeric (0 - 10)  Pain Intensity 1: 4  Pain Location 1: Mouth (r/t tongue ca & mucositis)  After treatment:   [X]              Patient left in no apparent distress sitting up in chair  [ ]              Patient left in no apparent distress in bed  [X]              Call bell left within reach  [X]              Nursing notified  [ ]              Caregiver present  [ ]              Bed alarm activated COMMUNICATION/EDUCATION:   The patients plan of care including recommendations, planned interventions, and recommended diet changes were discussed with: Registered Nurse.         OMAYRA Back  Time Calculation: 12 mins

## 2017-10-11 NOTE — PROGRESS NOTES
Hematology-Oncology Progress Note    Darian [place]  1940  [id number]  10/11/2017    Follow-up for: head and neck cancer     [x]        Chart notes since last visit reviewed   [x]        Medications reviewed for allergies and interactions       Case discussed with the following:         []                            [x]        Nursing Staff                                                                         []        Pathologist                                                                        []        FAMILY                                                                         Subjective:     Spoke with patient who complains of: pt. Had a good night, was extubated yesterday, has dry sore mouth no other c/o today    Objective:     Patient Vitals for the past 24 hrs:   BP Temp Pulse Resp SpO2   10/11/17 1511 176/82 97.3 °F (36.3 °C) 82 20 95 %   10/11/17 0947 128/78 - - - -   10/11/17 0926 121/88 - - - -   10/11/17 0810 (!) 195/98 97.6 °F (36.4 °C) 76 22 97 %   10/11/17 0351 187/80 97.6 °F (36.4 °C) 68 16 94 %   10/10/17 2045 (!) 160/98 97.3 °F (36.3 °C) 81 18 98 %       REVIEW OF SYSTEMS:    Not obtainable    Constitutional:  Patient looks  []        Sick  []        Frail  [x]        Better                                                 []        Depressed    HEENT:  [x]   NC                         []   AT               []    ALOPECIA         ngt tube in place  Eyes: [x]   Normal               []    Icteric  Oropharynx: []    Normal                  []  Thrush               []   Dry  Mucositis: []    None                 Grade: []        I  []        II  [x]        III  []        IV  Neck:   [x]   Supple                  []  Rigid   Diminished R neck mass            JVD:    [x]   ABSENT       []   PRESENT  Lymphadenopathy:   [x]   None Noted            []   PRESENT    Chest:  [x]   Clear             anteriorly    CV:             [x]   Regular              []  Irregular               [] Tachy                []   Murmur  Abdominal:   [x]    Soft              []   NON-tender               []   Tender      BS:    []   ABSENT                   []   PRESENT  Liver:     [x]  NON-palp                  []   EDGE- palp  Spleen: [x]   NON-palp                   []  EDGE - palp  Mass:   [x]   ABSENT                          []  PRESENT  Extr:    [x]  Lymphedema             []   Cyanosis      []  Clubbing  Edema:     [x]   NONE       []   PRESENT  Skin:  Intact [x]           Purpura []        Rash: [x]   ABSENT       []  PRESENT  Neuro:  Responds to commands, moves extremities to command      Available labs reviewed:  Labs:    Recent Results (from the past 24 hour(s))   GLUCOSE, POC    Collection Time: 10/10/17  9:22 PM   Result Value Ref Range    Glucose (POC) 255 (H) 65 - 100 mg/dL    Performed by Golden Jimenez    CBC WITH AUTOMATED DIFF    Collection Time: 10/11/17  3:23 AM   Result Value Ref Range    WBC 17.6 (H) 4.1 - 11.1 K/uL    RBC 3.64 (L) 4.10 - 5.70 M/uL    HGB 11.3 (L) 12.1 - 17.0 g/dL    HCT 33.6 (L) 36.6 - 50.3 %    MCV 92.3 80.0 - 99.0 FL    MCH 31.0 26.0 - 34.0 PG    MCHC 33.6 30.0 - 36.5 g/dL    RDW 13.9 11.5 - 14.5 %    PLATELET 98 (L) 340 - 400 K/uL    NEUTROPHILS 88 (H) 32 - 75 %    BAND NEUTROPHILS 5 0 - 6 %    LYMPHOCYTES 5 (L) 12 - 49 %    MONOCYTES 2 (L) 5 - 13 %    EOSINOPHILS 0 0 - 7 %    BASOPHILS 0 0 - 1 %    ABS. NEUTROPHILS 16.3 (H) 1.8 - 8.0 K/UL    ABS. LYMPHOCYTES 0.9 0.8 - 3.5 K/UL    ABS. MONOCYTES 0.4 0.0 - 1.0 K/UL    ABS. EOSINOPHILS 0.0 0.0 - 0.4 K/UL    ABS.  BASOPHILS 0.0 0.0 - 0.1 K/UL    DF MANUAL      RBC COMMENTS POLYCHROMASIA  1+        RBC COMMENTS ANISOCYTOSIS  1+        RBC COMMENTS ARMANDO CELLS  PRESENT        WBC COMMENTS DOHLE BODIES     METABOLIC PANEL, COMPREHENSIVE    Collection Time: 10/11/17  3:23 AM   Result Value Ref Range    Sodium 149 (H) 136 - 145 mmol/L    Potassium 3.4 (L) 3.5 - 5.1 mmol/L    Chloride 119 (H) 97 - 108 mmol/L    CO2 21 21 - 32 mmol/L Anion gap 9 5 - 15 mmol/L    Glucose 284 (H) 65 - 100 mg/dL    BUN 48 (H) 6 - 20 MG/DL    Creatinine 1.84 (H) 0.70 - 1.30 MG/DL    BUN/Creatinine ratio 26 (H) 12 - 20      GFR est AA 44 (L) >60 ml/min/1.73m2    GFR est non-AA 36 (L) >60 ml/min/1.73m2    Calcium 8.8 8.5 - 10.1 MG/DL    Bilirubin, total 0.4 0.2 - 1.0 MG/DL    ALT (SGPT) 16 12 - 78 U/L    AST (SGOT) 14 (L) 15 - 37 U/L    Alk. phosphatase 93 45 - 117 U/L    Protein, total 7.0 6.4 - 8.2 g/dL    Albumin 1.5 (L) 3.5 - 5.0 g/dL    Globulin 5.5 (H) 2.0 - 4.0 g/dL    A-G Ratio 0.3 (L) 1.1 - 2.2     NUCLEATED RBC    Collection Time: 10/11/17  3:23 AM   Result Value Ref Range    NRBC 0.6 (H) 0  WBC    ABSOLUTE NRBC 0.10 (H) 0.00 - 0.01 K/uL    WBC CORRECTED FOR NR ADJUSTED FOR NUCLEATED RBC'S     GLUCOSE, POC    Collection Time: 10/11/17  6:03 AM   Result Value Ref Range    Glucose (POC) 244 (H) 65 - 100 mg/dL    Performed by Luke Charles    GLUCOSE, POC    Collection Time: 10/11/17 11:11 AM   Result Value Ref Range    Glucose (POC) 235 (H) 65 - 100 mg/dL    Performed by Malachi Mccarthy, POC    Collection Time: 10/11/17  4:36 PM   Result Value Ref Range    Glucose (POC) 167 (H) 65 - 100 mg/dL    Performed by Rainer Burgos        Available Xrays reviewed:    Chemotherapy monitored and toxicities assessed:    Assessment and Plan   1. Hypotension. .. Pt. Was found unresponsive on 10/9 code blue was called, initial bp was 60/40,,,,, symptoms have now resolved, cause is unclear, r/o arrhythmia/sepsis/PE, pt. Now extubated is hemodynamically stable , we can move him back to [address].  D/c NGT,,, obtain speech Rx consult for swallowing eval  2. Locally adv. Head and neck cancer. .. Pt. Received C#1 Taxotere/carboplatin/5FU beginning 9/29 for 4 days,,, he did well with treatment but was admitted for Mucositis on Friday. .. The mass is smaller today  3. Diabetes,,, continue current mgmt  4. Azotemia. ..  Pt. Has chronic kidney disease, creatinine this am is improved. at 1.8. Violet Krish Violet Rutherford Carboplatin was given on 9/29 ,, this drug does not usually cause renal complications other than RTA  5. Anemia/thrombocytopenia. .. Secondary to chemo , he was also found to be iron deficient during the last admission, follow daily cbc  He failed bedside and radiographic swallowing evaluation, so he is NPO. He and family do not want anymore aggressive therapy including chemotherapy, radiation, feeding tube. They want home hospice. Will try to set that up tomorrow. Comfort measures only. DNR.       Wilber Van MD

## 2017-10-11 NOTE — DIABETES MGMT
DTC Progress Note    Recommendations/ Comments: Chart reviewed due to hyperglycemia. Blood sugars >200 and pt has required 9 units of correction insulin over the last 24 hours. Noted correction scale changed to Humalog with high sensitivity. Noted is NPO but D5 is running. If appropriate, please consider starting Lantus 20 units    Chart reviewed on [facility]. Patient is a 69 y.o. male with known diabetes on Amaryl 4 mg at home. A1c:   No results found for: HBA1C, HGBE8, WHL2GJFC, XYA5VCCO    Recent Glucose Results:   Lab Results   Component Value Date/Time     (H) 10/11/2017 03:23 AM    GLUCPOC 244 (H) 10/11/2017 06:03 AM    GLUCPOC 255 (H) 10/10/2017 09:22 PM    GLUCPOC 258 (H) 10/10/2017 04:38 PM        Lab Results   Component Value Date/Time    Creatinine 1.84 10/11/2017 03:23 AM     Estimated Creatinine Clearance: 34.2 mL/min (based on Cr of 1.84). Active Orders   Diet    DIET NPO        PO intake:   Patient Vitals for the past 72 hrs:   % Diet Eaten   10/08/17 1324 0 %       Current hospital DM medication: correction scale Humalog, high sensitivity. Will continue to follow as needed.     Thank you  Fortino Mixon, RD, CDE

## 2017-10-11 NOTE — PROGRESS NOTES
NAME: Bernadette Haider        :  1940        MRN:  [id number]        Assessment :    Plan:  --Head/Neck Cancer  RODOLFO-volume depletion/loop diuretics  Free water deficit  Mucositis  Pancytopenia  Hypernatremia  Resp. Arrest 10/9  Shock --Creatinine about the same. On IVF. Agree with hypotonic fluids. Good UO S/P Reyes. Subjective:     Chief Complaint: Extubated. Quiet. Review of Systems: UTO    Symptom Y/N Comments  Symptom Y/N Comments   Fever/Chills    Chest Pain     Poor Appetite    Edema     Cough    Abdominal Pain     Sputum    Joint Pain     SOB/JAIMES    Pruritis/Rash     Nausea/vomit    Tolerating PT/OT     Diarrhea    Tolerating Diet     Constipation    Other       Could not obtain due to:      Objective:     VITALS:   Last 24hrs VS reviewed since prior progress note.  Most recent are:  Visit Vitals    /78    Pulse 76    Temp 97.6 °F (36.4 °C)    Resp 22    Ht 5' 9\" (1.753 m)    Wt 82.8 kg (182 lb 9.6 oz)    SpO2 97%    BMI 26.97 kg/m2       Intake/Output Summary (Last 24 hours) at 10/11/17 1123  Last data filed at 10/11/17 0941   Gross per 24 hour   Intake                0 ml   Output             7230 ml   Net            -7230 ml      Telemetry Reviewed:     PHYSICAL EXAM:  General: NAD  Bilateral rhonchi  abd soft  No edema      Lab Data Reviewed: (see below)    Medications Reviewed: (see below)    PMH/SH reviewed - no change compared to H&P  ________________________________________________________________________  Care Plan discussed with:  Patient     Family      RN     Care Manager                    Consultant:          Comments   >50% of visit spent in counseling and coordination of care       ________________________________________________________________________  Jordana Jones MD     Procedures: see electronic medical records for all procedures/Xrays and details which  were not copied into this note but were reviewed prior to creation of Plan. LABS:  Recent Labs      10/11/17   0323  10/10/17   0551   WBC  17.6*  12.7*   HGB  11.3*  9.8*   HCT  33.6*  29.5*   PLT  98*  84*     Recent Labs      10/11/17   0323  10/10/17   0551  10/09/17   1029   NA  149*  152*  144   K  3.4*  4.0  3.2*   CL  119*  121*  115*   CO2  21  18*  21   BUN  48*  52*  47*   CREA  1.84*  1.83*  1.98*   GLU  284*  241*  414*   CA  8.8  8.1*  8.1*   MG   --    --   2.5*   PHOS   --    --   3.6     Recent Labs      10/11/17   0323  10/10/17   0551  10/09/17   1029   SGOT  14*  12*  14*   AP  93  74  59   TP  7.0  6.5  6.5   ALB  1.5*  1.6*  1.7*   GLOB  5.5*  4.9*  4.8*     Recent Labs      10/10/17   0551   INR  1.1   PTP  11.3*      No results for input(s): FE, TIBC, PSAT, FERR in the last 72 hours. Lab Results   Component Value Date/Time    Folate 13.8 09/30/2017 03:43 AM      No results for input(s): PH, PCO2, PO2 in the last 72 hours.   Recent Labs      10/09/17   1029   CPK  101     No components found for: [place]  Lab Results   Component Value Date/Time    Color YELLOW/STRAW 10/09/2017 12:13 PM    Appearance TURBID 10/09/2017 12:13 PM    Specific gravity 1.025 10/09/2017 12:13 PM    pH (UA) 6.0 10/09/2017 12:13 PM    Protein 100 10/09/2017 12:13 PM    Glucose 500 10/09/2017 12:13 PM    Ketone NEGATIVE  10/09/2017 12:13 PM    Bilirubin NEGATIVE  10/09/2017 12:13 PM    Urobilinogen 0.2 10/09/2017 12:13 PM    Nitrites NEGATIVE  10/09/2017 12:13 PM    Leukocyte Esterase NEGATIVE  10/09/2017 12:13 PM    Epithelial cells MODERATE 10/09/2017 12:13 PM    Bacteria 3+ 10/09/2017 12:13 PM    WBC 5-10 10/09/2017 12:13 PM    RBC 5-10 10/09/2017 12:13 PM       MEDICATIONS:  Current Facility-Administered Medications   Medication Dose Route Frequency    potassium chloride 10 mEq in 50 ml IVPB  10 mEq IntraVENous Q1H    dextrose 5% infusion  75 mL/hr IntraVENous CONTINUOUS    polyvinyl alcohol (LIQUIFILM TEARS) 1.4 % ophthalmic solution 1 Drop  1 Drop Both Eyes PRN    balsam peru-castor oil (VENELEX)  mg/gram ointment   Topical BID    insulin lispro (HUMALOG) injection   SubCUTAneous Q6H    glucose chewable tablet 16 g  4 Tab Oral PRN    dextrose (D50W) injection syrg 12.5-25 g  12.5-25 g IntraVENous PRN    glucagon (GLUCAGEN) injection 1 mg  1 mg IntraMUSCular PRN    fentaNYL citrate (PF) injection 25 mcg  25 mcg IntraVENous Q4H PRN    levoFLOXacin (LEVAQUIN) 250 mg in D5W IVPB  250 mg IntraVENous Q24H    acetaminophen (TYLENOL) tablet 500 mg  500 mg Oral Q6H PRN    albuterol (PROVENTIL VENTOLIN) nebulizer solution 2.5 mg  2.5 mg Nebulization Q4H PRN    carvedilol (COREG) tablet 12.5 mg  12.5 mg Oral BID WITH MEALS    isosorbide mononitrate (ISMO, MONOKET) tablet 20 mg  20 mg Oral BID    magic mouthwash cpd (without sucralfate)  5 mL Oral QID    nicotine (NICODERM CQ) 21 mg/24 hr patch 1 Patch  1 Patch TransDERmal Q24H    oxyCODONE-acetaminophen (PERCOCET) 5-325 mg per tablet 1 Tab  1 Tab Oral Q4H PRN    tamsulosin (FLOMAX) capsule 0.4 mg  0.4 mg Oral DAILY    ipratropium (ATROVENT) 0.02 % nebulizer solution 0.5 mg  0.5 mg Nebulization Q6H RT    enoxaparin (LOVENOX) injection 40 mg  40 mg SubCUTAneous DAILY    ondansetron (ZOFRAN) injection 4 mg  4 mg IntraVENous Q6H PRN    zolpidem (AMBIEN) tablet 5 mg  5 mg Oral QHS PRN    cloNIDine (CATAPRES) 0.2 mg/24 hr patch 1 Patch  1 Patch TransDERmal ONCE    cefepime (MAXIPIME) 2 g in 0.9% sodium chloride (MBP/ADV) 100 mL  2 g IntraVENous Q12H

## 2017-10-11 NOTE — PROGRESS NOTES
Bedside and Verbal shift change report given to LAURI Brambila (oncoming nurse) by Bettye Gil RN (offgoing nurse). Report included the following information SBAR, Kardex, Intake/Output, MAR and Accordion.

## 2017-10-11 NOTE — PROGRESS NOTES
Problem: Falls - Risk of  Goal: *Absence of Falls  Document Mik Fall Risk and appropriate interventions in the flowsheet.    Outcome: Progressing Towards Goal  Fall Risk Interventions:  Mobility Interventions: Communicate number of staff needed for ambulation/transfer, Patient to call before getting OOB     Mentation Interventions: Door open when patient unattended     Medication Interventions: Bed/chair exit alarm, Evaluate medications/consider consulting pharmacy, Patient to call before getting OOB, Teach patient to arise slowly     Elimination Interventions: Bed/chair exit alarm, Call light in reach, Patient to call for help with toileting needs, Toilet paper/wipes in reach

## 2017-10-11 NOTE — PROGRESS NOTES
Hospitalist Progress Note  Britney Comer MD  Answering service: [phone] from in house phone        Date of Service:  10/11/2017  NAME:  Lucrecia Troncoso  :  1940  MRN:  [id number]      Admission Summary:   S/p code blue for unresponsiveness,hypotension,hypoxia. He was intubated and moved to ICU  Does not seem CPR done per chart. Interval history / Subjective:     Alert and awake,up in [employer]. Assessment & Plan:   Unresponsiveness,hypotension,hypoxia due to over narcosis,poor oral intake. Resolved  -Extubated and out of ICU by 10/10,hemodynamically stable  -Alert and awake. Febrile neutropenia:continue antibiotics. Afebrile. Cultures negative. Acute kidney injury or chronic kidney disease, stage III due to ECF contraction as a result of poor intake:cpntinue iv fluids   Hypoglycemia secondary to decreased p.o. intake. On IV dextrose   Hypernatremia. hypovolemic. Continue hypotonic fluids. Head and neck cancer, status post cycle 1 chemotherapy. Dysphagia due to mucositis: NPO.  -Patient expressing he does not want artificial feeding   -may need to consult palliative care. Elevated BP  -He is npo. Use nitropaste prn. Hospital Problems  Never Reviewed          Codes Class Noted POA    [facility] ICD-10-CM: C02.9  ICD-9-CM: 141.9  10/6/2017 Unknown        * (Principal)Mucositis ICD-10-CM: K12.30  ICD-9-CM: 528.00  10/6/2017 Unknown                Review of Systems:   Review of systems not obtained due to patient factors. Vital Signs:    Last 24hrs VS reviewed since prior progress note.  Most recent are:  Visit Vitals    /82 (BP 1 Location: Left arm, BP Patient Position: At rest)    Pulse 82    Temp 97.3 °F (36.3 °C)    Resp 20    Ht 5' 9\" (1.753 m)    Wt 82.8 kg (182 lb 9.6 oz)    SpO2 95%    BMI 26.97 kg/m2         Intake/Output Summary (Last 24 hours) at 10/11/17 1622  Last data filed at 10/11/17 1504   Gross per 24 hour   Intake                0 ml   Output             7980 ml   Net            -7980 ml        Physical Examination:             Constitutional: Awake,weak,   ENT:  Oral mucous moist, oropharynx benign. Neck supple,    Resp:  CTA bilaterally. CV:  Regular rhythm, normal rate, no murmurs, gallops, rubs    GI:  Soft, non distended, non tender. normoactive bowel sounds, no hepatosplenomegaly     Musculoskeletal:  No edema, warm, 2+ pulses throughout    Neurologic:  Moves all extremities. AAOx3, CN II-XII reviewed            Data Review:    Review and/or order of clinical lab test  Review and/or order of tests in the radiology section of CPT  Review and/or order of tests in the medicine section of CPT      Labs:     Recent Labs      10/11/17   0323  10/10/17   0551   WBC  17.6*  12.7*   HGB  11.3*  9.8*   HCT  33.6*  29.5*   PLT  98*  84*     Recent Labs      10/11/17   0323  10/10/17   0551  10/09/17   1029   NA  149*  152*  144   K  3.4*  4.0  3.2*   CL  119*  121*  115*   CO2  21  18*  21   BUN  48*  52*  47*   CREA  1.84*  1.83*  1.98*   GLU  284*  241*  414*   CA  8.8  8.1*  8.1*   MG   --    --   2.5*   PHOS   --    --   3.6     Recent Labs      10/11/17   0323  10/10/17   0551  10/09/17   1029   SGOT  14*  12*  14*   ALT  16  16  17   AP  93  74  59   TBILI  0.4  0.4  0.4   TP  7.0  6.5  6.5   ALB  1.5*  1.6*  1.7*   GLOB  5.5*  4.9*  4.8*     Recent Labs      10/10/17   0551   INR  1.1   PTP  11.3*      No results for input(s): FE, TIBC, PSAT, FERR in the last 72 hours. Lab Results   Component Value Date/Time    Folate 13.8 09/30/2017 03:43 AM      No results for input(s): PH, PCO2, PO2 in the last 72 hours.   Recent Labs      10/09/17   1029   CPK  101   TROIQ  0.15*     No results found for: CHOL, CHOLX, CHLST, CHOLV, HDL, LDL, LDLC, DLDLP, TGLX, TRIGL, TRIGP, CHHD, CHHDX  Lab Results   Component Value Date/Time    Glucose (POC) 235 10/11/2017 11:11 AM    Glucose (POC) 244 10/11/2017 06:03 AM    Glucose (POC) 255 10/10/2017 09:22 PM    Glucose (POC) 258 10/10/2017 04:38 PM    Glucose (POC) 215 10/10/2017 12:00 PM     Lab Results   Component Value Date/Time    Color YELLOW/STRAW 10/09/2017 12:13 PM    Appearance TURBID 10/09/2017 12:13 PM    Specific gravity 1.025 10/09/2017 12:13 PM    pH (UA) 6.0 10/09/2017 12:13 PM    Protein 100 10/09/2017 12:13 PM    Glucose 500 10/09/2017 12:13 PM    Ketone NEGATIVE  10/09/2017 12:13 PM    Bilirubin NEGATIVE  10/09/2017 12:13 PM    Urobilinogen 0.2 10/09/2017 12:13 PM    Nitrites NEGATIVE  10/09/2017 12:13 PM    Leukocyte Esterase NEGATIVE  10/09/2017 12:13 PM    Epithelial cells MODERATE 10/09/2017 12:13 PM    Bacteria 3+ 10/09/2017 12:13 PM    WBC 5-10 10/09/2017 12:13 PM    RBC 5-10 10/09/2017 12:13 PM         Medications Reviewed:     Current Facility-Administered Medications   Medication Dose Route Frequency    potassium chloride 10 mEq in 50 ml IVPB  10 mEq IntraVENous Q1H    dextrose 5% infusion  75 mL/hr IntraVENous CONTINUOUS    polyvinyl alcohol (LIQUIFILM TEARS) 1.4 % ophthalmic solution 1 Drop  1 Drop Both Eyes PRN    balsam peru-castor oil (VENELEX)  mg/gram ointment   Topical BID    insulin lispro (HUMALOG) injection   SubCUTAneous Q6H    glucose chewable tablet 16 g  4 Tab Oral PRN    dextrose (D50W) injection syrg 12.5-25 g  12.5-25 g IntraVENous PRN    glucagon (GLUCAGEN) injection 1 mg  1 mg IntraMUSCular PRN    fentaNYL citrate (PF) injection 25 mcg  25 mcg IntraVENous Q4H PRN    levoFLOXacin (LEVAQUIN) 250 mg in D5W IVPB  250 mg IntraVENous Q24H    acetaminophen (TYLENOL) tablet 500 mg  500 mg Oral Q6H PRN    albuterol (PROVENTIL VENTOLIN) nebulizer solution 2.5 mg  2.5 mg Nebulization Q4H PRN    carvedilol (COREG) tablet 12.5 mg  12.5 mg Oral BID WITH MEALS    isosorbide mononitrate (ISMO, MONOKET) tablet 20 mg  20 mg Oral BID    magic mouthwash cpd (without sucralfate)  5 mL Oral QID    nicotine (NICODERM CQ) 21 mg/24 hr patch 1 Patch  1 Patch TransDERmal Q24H    oxyCODONE-acetaminophen (PERCOCET) 5-325 mg per tablet 1 Tab  1 Tab Oral Q4H PRN    tamsulosin (FLOMAX) capsule 0.4 mg  0.4 mg Oral DAILY    ipratropium (ATROVENT) 0.02 % nebulizer solution 0.5 mg  0.5 mg Nebulization Q6H RT    enoxaparin (LOVENOX) injection 40 mg  40 mg SubCUTAneous DAILY    ondansetron (ZOFRAN) injection 4 mg  4 mg IntraVENous Q6H PRN    zolpidem (AMBIEN) tablet 5 mg  5 mg Oral QHS PRN    cloNIDine (CATAPRES) 0.2 mg/24 hr patch 1 Patch  1 Patch TransDERmal ONCE    cefepime (MAXIPIME) 2 g in 0.9% sodium chloride (MBP/ADV) 100 mL  2 g IntraVENous Q12H     ______________________________________________________________________  EXPECTED LENGTH OF STAY: 5d 9h  ACTUAL LENGTH OF STAY:          5                 Jordana Hermosillo MD

## 2017-10-12 LAB
ALBUMIN SERPL-MCNC: 1.3 G/DL (ref 3.5–5)
ALBUMIN/GLOB SERPL: 0.2 {RATIO} (ref 1.1–2.2)
ALP SERPL-CCNC: 102 U/L (ref 45–117)
ALT SERPL-CCNC: 18 U/L (ref 12–78)
ANION GAP SERPL CALC-SCNC: 12 MMOL/L (ref 5–15)
AST SERPL-CCNC: 19 U/L (ref 15–37)
BACTERIA SPEC CULT: NORMAL
BACTERIA SPEC CULT: NORMAL
BASOPHILS # BLD: 0 K/UL (ref 0–0.1)
BASOPHILS NFR BLD: 0 % (ref 0–1)
BILIRUB SERPL-MCNC: 0.4 MG/DL (ref 0.2–1)
BLASTS NFR BLD MANUAL: 0 %
BUN SERPL-MCNC: 41 MG/DL (ref 6–20)
BUN/CREAT SERPL: 25 (ref 12–20)
CALCIUM SERPL-MCNC: 8.3 MG/DL (ref 8.5–10.1)
CHLORIDE SERPL-SCNC: 117 MMOL/L (ref 97–108)
CO2 SERPL-SCNC: 20 MMOL/L (ref 21–32)
CREAT SERPL-MCNC: 1.62 MG/DL (ref 0.7–1.3)
DIFFERENTIAL METHOD BLD: ABNORMAL
EOSINOPHIL # BLD: 0 K/UL (ref 0–0.4)
EOSINOPHIL NFR BLD: 0 % (ref 0–7)
ERYTHROCYTE [DISTWIDTH] IN BLOOD BY AUTOMATED COUNT: 13.8 % (ref 11.5–14.5)
GLOBULIN SER CALC-MCNC: 5.5 G/DL (ref 2–4)
GLUCOSE BLD STRIP.AUTO-MCNC: 200 MG/DL (ref 65–100)
GLUCOSE BLD STRIP.AUTO-MCNC: 227 MG/DL (ref 65–100)
GLUCOSE BLD STRIP.AUTO-MCNC: 255 MG/DL (ref 65–100)
GLUCOSE SERPL-MCNC: 306 MG/DL (ref 65–100)
HCT VFR BLD AUTO: 31.7 % (ref 36.6–50.3)
HGB BLD-MCNC: 10.4 G/DL (ref 12.1–17)
LYMPHOCYTES # BLD: 0.5 K/UL (ref 0.8–3.5)
LYMPHOCYTES NFR BLD: 3 % (ref 12–49)
MAGNESIUM SERPL-MCNC: 2.1 MG/DL (ref 1.6–2.4)
MANUAL DIFFERENTIAL PERFORMED BLD QL: ABNORMAL
MCH RBC QN AUTO: 30 PG (ref 26–34)
MCHC RBC AUTO-ENTMCNC: 32.8 G/DL (ref 30–36.5)
MCV RBC AUTO: 91.4 FL (ref 80–99)
METAMYELOCYTES NFR BLD MANUAL: 0 %
MONOCYTES # BLD: 0.2 K/UL (ref 0–1)
MONOCYTES NFR BLD: 1 % (ref 5–13)
MYELOCYTES NFR BLD MANUAL: 0 %
NEUTS BAND NFR BLD MANUAL: 6 % (ref 0–6)
NEUTS SEG # BLD: 17.5 K/UL (ref 1.8–8)
NEUTS SEG NFR BLD: 90 % (ref 32–75)
NRBC # BLD: 0.14 K/UL (ref 0–0.01)
NRBC BLD-RTO: 0.8 PER 100 WBC
OTHER CELLS NFR BLD MANUAL: 0 %
PHOSPHATE SERPL-MCNC: 1.9 MG/DL (ref 2.6–4.7)
PLATELET # BLD AUTO: 108 K/UL (ref 150–400)
PLATELET COMMENTS,PCOM: ABNORMAL
POTASSIUM SERPL-SCNC: 3.7 MMOL/L (ref 3.5–5.1)
PROMYELOCYTES NFR BLD MANUAL: 0 %
PROT SERPL-MCNC: 6.8 G/DL (ref 6.4–8.2)
RBC # BLD AUTO: 3.47 M/UL (ref 4.1–5.7)
RBC MORPH BLD: ABNORMAL
SERVICE CMNT-IMP: ABNORMAL
SERVICE CMNT-IMP: NORMAL
SERVICE CMNT-IMP: NORMAL
SODIUM SERPL-SCNC: 149 MMOL/L (ref 136–145)
WBC # BLD AUTO: 18.2 K/UL (ref 4.1–11.1)
WBC NRBC COR # BLD: ABNORMAL 10*3/UL

## 2017-10-12 PROCEDURE — 80053 COMPREHEN METABOLIC PANEL: CPT | Performed by: HOSPITALIST

## 2017-10-12 PROCEDURE — 74011250636 HC RX REV CODE- 250/636: Performed by: INTERNAL MEDICINE

## 2017-10-12 PROCEDURE — 74011250637 HC RX REV CODE- 250/637: Performed by: INTERNAL MEDICINE

## 2017-10-12 PROCEDURE — 84100 ASSAY OF PHOSPHORUS: CPT | Performed by: HOSPITALIST

## 2017-10-12 PROCEDURE — 74011636637 HC RX REV CODE- 636/637: Performed by: HOSPITALIST

## 2017-10-12 PROCEDURE — 74011000250 HC RX REV CODE- 250: Performed by: INTERNAL MEDICINE

## 2017-10-12 PROCEDURE — 65270000032 HC RM SEMIPRIVATE

## 2017-10-12 PROCEDURE — 82962 GLUCOSE BLOOD TEST: CPT

## 2017-10-12 PROCEDURE — 85027 COMPLETE CBC AUTOMATED: CPT | Performed by: HOSPITALIST

## 2017-10-12 PROCEDURE — 83735 ASSAY OF MAGNESIUM: CPT | Performed by: HOSPITALIST

## 2017-10-12 PROCEDURE — 94664 DEMO&/EVAL PT USE INHALER: CPT

## 2017-10-12 PROCEDURE — 36415 COLL VENOUS BLD VENIPUNCTURE: CPT | Performed by: HOSPITALIST

## 2017-10-12 PROCEDURE — 74011000258 HC RX REV CODE- 258: Performed by: INTERNAL MEDICINE

## 2017-10-12 PROCEDURE — 74011250637 HC RX REV CODE- 250/637: Performed by: HOSPITALIST

## 2017-10-12 PROCEDURE — 94640 AIRWAY INHALATION TREATMENT: CPT

## 2017-10-12 PROCEDURE — 77010033678 HC OXYGEN DAILY

## 2017-10-12 RX ORDER — CLONIDINE 0.3 MG/24H
1 PATCH, EXTENDED RELEASE TRANSDERMAL
Status: DISCONTINUED | OUTPATIENT
Start: 2017-10-13 | End: 2017-10-13 | Stop reason: HOSPADM

## 2017-10-12 RX ORDER — HYDRALAZINE HYDROCHLORIDE 20 MG/ML
10 INJECTION INTRAMUSCULAR; INTRAVENOUS
Status: COMPLETED | OUTPATIENT
Start: 2017-10-12 | End: 2017-10-12

## 2017-10-12 RX ORDER — CLONIDINE 0.2 MG/24H
1 PATCH, EXTENDED RELEASE TRANSDERMAL
Status: DISCONTINUED | OUTPATIENT
Start: 2017-10-13 | End: 2017-10-12

## 2017-10-12 RX ADMIN — IPRATROPIUM BROMIDE 0.5 MG: 0.5 SOLUTION RESPIRATORY (INHALATION) at 10:07

## 2017-10-12 RX ADMIN — FENTANYL CITRATE 25 MCG: 50 INJECTION, SOLUTION INTRAMUSCULAR; INTRAVENOUS at 17:51

## 2017-10-12 RX ADMIN — LEVOFLOXACIN 250 MG: 5 INJECTION, SOLUTION INTRAVENOUS at 14:22

## 2017-10-12 RX ADMIN — NITROGLYCERIN 1 INCH: 20 OINTMENT TOPICAL at 17:22

## 2017-10-12 RX ADMIN — FENTANYL CITRATE 25 MCG: 50 INJECTION, SOLUTION INTRAMUSCULAR; INTRAVENOUS at 00:42

## 2017-10-12 RX ADMIN — DEXTROSE MONOHYDRATE 75 ML/HR: 5 INJECTION, SOLUTION INTRAVENOUS at 07:37

## 2017-10-12 RX ADMIN — CASTOR OIL AND BALSAM, PERU: 788; 87 OINTMENT TOPICAL at 08:31

## 2017-10-12 RX ADMIN — SODIUM CHLORIDE: 900 INJECTION, SOLUTION INTRAVENOUS at 09:09

## 2017-10-12 RX ADMIN — FENTANYL CITRATE 25 MCG: 50 INJECTION, SOLUTION INTRAMUSCULAR; INTRAVENOUS at 13:01

## 2017-10-12 RX ADMIN — HYDRALAZINE HYDROCHLORIDE 10 MG: 20 INJECTION INTRAMUSCULAR; INTRAVENOUS at 09:06

## 2017-10-12 RX ADMIN — CASTOR OIL AND BALSAM, PERU: 788; 87 OINTMENT TOPICAL at 17:31

## 2017-10-12 RX ADMIN — ENOXAPARIN SODIUM 40 MG: 100 INJECTION SUBCUTANEOUS at 08:26

## 2017-10-12 RX ADMIN — HYDRALAZINE HYDROCHLORIDE 10 MG: 20 INJECTION INTRAMUSCULAR; INTRAVENOUS at 15:14

## 2017-10-12 RX ADMIN — IPRATROPIUM BROMIDE 0.5 MG: 0.5 SOLUTION RESPIRATORY (INHALATION) at 22:43

## 2017-10-12 RX ADMIN — FENTANYL CITRATE 25 MCG: 50 INJECTION, SOLUTION INTRAMUSCULAR; INTRAVENOUS at 05:50

## 2017-10-12 RX ADMIN — INSULIN LISPRO 2 UNITS: 100 INJECTION, SOLUTION INTRAVENOUS; SUBCUTANEOUS at 00:43

## 2017-10-12 RX ADMIN — INSULIN LISPRO 3 UNITS: 100 INJECTION, SOLUTION INTRAVENOUS; SUBCUTANEOUS at 05:35

## 2017-10-12 RX ADMIN — CEFEPIME 2 G: 2 INJECTION, POWDER, FOR SOLUTION INTRAVENOUS at 12:53

## 2017-10-12 RX ADMIN — CEFEPIME 2 G: 2 INJECTION, POWDER, FOR SOLUTION INTRAVENOUS at 23:36

## 2017-10-12 RX ADMIN — IPRATROPIUM BROMIDE 0.5 MG: 0.5 SOLUTION RESPIRATORY (INHALATION) at 02:24

## 2017-10-12 RX ADMIN — INSULIN LISPRO 2 UNITS: 100 INJECTION, SOLUTION INTRAVENOUS; SUBCUTANEOUS at 13:01

## 2017-10-12 RX ADMIN — NITROGLYCERIN 1 INCH: 20 OINTMENT TOPICAL at 08:30

## 2017-10-12 NOTE — PROGRESS NOTES
NUTRITION       Chart reviewed, discussed with RN and team during interdisciplinary rounds. Pt remains at high risk for aspiration, but does not want further intervention, including a PEG. Hospice consult noted. He will remain NPO while in the hospital, but family may provide po for pleasure on discharge. Nutrition intervention is not indicated at this time, but please consult if this nutrition team can be of assistance. Thank you.      [address]

## 2017-10-12 NOTE — PROGRESS NOTES
Bedside shift change report given to Lupe Bae RN (oncoming nurse) by Michel Muniz RN (offgoing nurse). Report included the following information SBAR and Kardex.

## 2017-10-12 NOTE — PROGRESS NOTES
Let Dr. Alexander Almaguer know I had to hold evening PO meds, the patient having failed the swallow test and now being NPO.

## 2017-10-12 NOTE — PROGRESS NOTES
Bedside shift change report given to Lizette Coates RN (oncoming nurse) by Chata Do RN (offgoing nurse). Report included the following information SBAR, Kardex and MAR.

## 2017-10-12 NOTE — PROGRESS NOTES
10/12/17 1450   Vitals   Temp 98.9 °F (37.2 °C)   Temp Source Axillary   Pulse (Heart Rate) 93   Heart Rate Source Monitor   Resp Rate 18   O2 Sat (%) 99 %   Level of Consciousness Alert   /82   MAP (Calculated) 121   BP 1 Location Left arm   BP 1 Method Automatic   BP Patient Position At rest   MEWS Score 3   1501- Paged Dr. Joan Harris to determine if Hydralazine IV can be administered 2hr early from parameters in [facility]    1508- Per Dr. Joan Harris can administer Hydralazine IV early.

## 2017-10-12 NOTE — DIABETES MGMT
DTC Progress Note    Recommendations/ Comments: Chart reviewed due to hyperglycemia. Blood sugars >200 and pt has required 9 units of correction insulin over the last 24 hours. Noted is NPO but D5 is running. If appropriate, please consider starting Lantus 20 units    [place]. Patient is a 69 y.o. male with known diabetes on Amaryl 4 mg at home. A1c:   No results found for: HBA1C, HGBE8, BFR2WUUS, FZG4XFDU    Recent Glucose Results:   Lab Results   Component Value Date/Time     (H) 10/12/2017 02:17 AM    GLUCPOC 200 (H) 10/12/2017 12:50 PM    GLUCPOC 255 (H) 10/12/2017 05:30 AM    GLUCPOC 241 (H) 10/11/2017 11:55 PM        Lab Results   Component Value Date/Time    Creatinine 1.62 10/12/2017 02:17 AM     Estimated Creatinine Clearance: 38.8 mL/min (based on Cr of 1.62). Active Orders   Diet    DIET NPO        PO intake:   No data found. Current hospital DM medication: correction scale Humalog, high sensitivity. Will continue to follow as needed.     Thank you  Janice Rowe RD, CDE

## 2017-10-12 NOTE — PROGRESS NOTES
9:10 AM  CM received a consult for Hospice. CM contacted patient's daughter, Sushant Rodriguez [phone] to review and discuss disposition needs. Family provided with freedom of choice. Family has selected [facility] in [place], [place] for transition of care services. Plan is for patient to discharge to daughter's home located at [address] [place] [address].[address]. Daughter indicated that patient would like to transition home tomorrow. Mode of transport at time of discharge will be via ambulance. CM contacted [facility] [phone] and spoke with Manan Gill to coordinate services. CM faxed order, demographic, H&P, and MARS for review to [phone]. CM provided Manan Gill with contact information for family. [facility] able to admit patient tomorrow for services. CM to provide update to attending. CM will continue to follow and assist with disposition needs as they arise.     KOSTAS Rodriguez/KAYDEN

## 2017-10-12 NOTE — PROGRESS NOTES
Hematology-Oncology Progress Note    Neftaly Gordon  1940  [id number]  10/12/2017    Follow-up for: head and neck cancer     [x]        Chart notes since last visit reviewed   [x]        Medications reviewed for allergies and interactions       Case discussed with the following:         []                            [x]        Nursing Staff                                                                         []        Pathologist                                                                        []        FAMILY                                                                         Subjective:     Spoke with patient who complains of: pt.  Is stable, no new problems over night,     Objective:     Patient Vitals for the past 24 hrs:   BP Temp Pulse Resp SpO2   10/12/17 1008 - - - - 99 %   10/12/17 0952 169/82 - - - -   10/12/17 0933 191/88 - - - -   10/12/17 0818 (!) 192/92 97.6 °F (36.4 °C) 77 18 99 %   10/12/17 0032 167/77 97.6 °F (36.4 °C) 82 19 100 %   10/11/17 2300 176/74 - 76 - -   10/11/17 2057 181/73 97.5 °F (36.4 °C) 75 18 97 %   10/11/17 1511 176/82 97.3 °F (36.3 °C) 82 20 95 %       REVIEW OF SYSTEMS:    Not obtainable    Constitutional:  Patient looks  []        Sick  []        Frail  [x]        Better                                                 []        Depressed    HEENT:  [x]   NC                         []   AT               []    ALOPECIA         2+  mucositis  Eyes: [x]   Normal               []    Icteric  Oropharynx: []    Normal                  []  Thrush               []   Dry  Mucositis: []    None                 Grade: []        I  []        II  [x]        III  []        IV  Neck:   [x]   Supple                  []  Rigid   Diminished R neck mass            JVD:    [x]   ABSENT       []   PRESENT  Lymphadenopathy:   [x]   None Noted            []   PRESENT    Chest:  [x]   Clear             anteriorly    CV:             [x]   Regular              []  Irregular []   Tachy                []   Murmur  Abdominal:   [x]    Soft              [x]   NON-tender               []   Tender      BS:    []   ABSENT                   []   PRESENT  Liver:     [x]  NON-palp                  []   EDGE- palp  Spleen: [x]   NON-palp                   []  EDGE - palp  Mass:   [x]   ABSENT                          []  PRESENT  Extr:    [x]  Lymphedema             []   Cyanosis      []  Clubbing  Edema:     [x]   NONE       []   PRESENT  Skin:  Intact [x]           Purpura []        Rash: [x]   ABSENT       []  PRESENT  Neuro:  Responds to commands, moves extremities to command      Available labs reviewed:  Labs:    Recent Results (from the past 24 hour(s))   GLUCOSE, POC    Collection Time: 10/11/17 11:11 AM   Result Value Ref Range    Glucose (POC) 235 (H) 65 - 100 mg/dL    Performed by Belia Kayser, POC    Collection Time: 10/11/17  4:36 PM   Result Value Ref Range    Glucose (POC) 167 (H) 65 - 100 mg/dL    Performed by Belia Kayser, POC    Collection Time: 10/11/17 11:55 PM   Result Value Ref Range    Glucose (POC) 241 (H) 65 - 100 mg/dL    Performed by Devon Madsen    CBC WITH MANUAL DIFF    Collection Time: 10/12/17  2:17 AM   Result Value Ref Range    WBC 18.2 (H) 4.1 - 11.1 K/uL    RBC 3.47 (L) 4.10 - 5.70 M/uL    HGB 10.4 (L) 12.1 - 17.0 g/dL    HCT 31.7 (L) 36.6 - 50.3 %    MCV 91.4 80.0 - 99.0 FL    MCH 30.0 26.0 - 34.0 PG    MCHC 32.8 30.0 - 36.5 g/dL    RDW 13.8 11.5 - 14.5 %    PLATELET 901 (L) 061 - 400 K/uL    NEUTROPHILS 90 (H) 32 - 75 %    BAND NEUTROPHILS 6 0 - 6 %    LYMPHOCYTES 3 (L) 12 - 49 %    MONOCYTES 1 (L) 5 - 13 %    EOSINOPHILS 0 0 - 7 %    BASOPHILS 0 0 - 1 %    METAMYELOCYTES 0 0 %    MYELOCYTES 0 0 %    PROMYELOCYTES 0 0 %    BLASTS 0 0 %    OTHER CELL 0 0      ABS. NEUTROPHILS 17.5 (H) 1.8 - 8.0 K/UL    ABS. LYMPHOCYTES 0.5 (L) 0.8 - 3.5 K/UL    ABS. MONOCYTES 0.2 0.0 - 1.0 K/UL    ABS. EOSINOPHILS 0.0 0.0 - 0.4 K/UL    ABS. BASOPHILS 0.0 0.0 - 0.1 K/UL    DF MANUAL      PLATELET COMMENTS LARGE PLATELETS      RBC COMMENTS ANISOCYTOSIS  1+        RBC COMMENTS ARMANDO CELLS  PRESENT        RBC COMMENTS POLYCHROMASIA  1+        NRBC 0.8 (H) 0  WBC    ABSOLUTE NRBC 0.14 (H) 0.00 - 0.01 K/uL    WBC CORRECTED FOR NR ADJUSTED FOR NUCLEATED RBC'S      DIFFERENTIAL MANUAL DIFFERENTIAL ORDERED     METABOLIC PANEL, COMPREHENSIVE    Collection Time: 10/12/17  2:17 AM   Result Value Ref Range    Sodium 149 (H) 136 - 145 mmol/L    Potassium 3.7 3.5 - 5.1 mmol/L    Chloride 117 (H) 97 - 108 mmol/L    CO2 20 (L) 21 - 32 mmol/L    Anion gap 12 5 - 15 mmol/L    Glucose 306 (H) 65 - 100 mg/dL    BUN 41 (H) 6 - 20 MG/DL    Creatinine 1.62 (H) 0.70 - 1.30 MG/DL    BUN/Creatinine ratio 25 (H) 12 - 20      GFR est AA 50 (L) >60 ml/min/1.73m2    GFR est non-AA 42 (L) >60 ml/min/1.73m2    Calcium 8.3 (L) 8.5 - 10.1 MG/DL    Bilirubin, total 0.4 0.2 - 1.0 MG/DL    ALT (SGPT) 18 12 - 78 U/L    AST (SGOT) 19 15 - 37 U/L    Alk. phosphatase 102 45 - 117 U/L    Protein, total 6.8 6.4 - 8.2 g/dL    Albumin 1.3 (L) 3.5 - 5.0 g/dL    Globulin 5.5 (H) 2.0 - 4.0 g/dL    A-G Ratio 0.2 (L) 1.1 - 2.2     MAGNESIUM    Collection Time: 10/12/17  2:17 AM   Result Value Ref Range    Magnesium 2.1 1.6 - 2.4 mg/dL   PHOSPHORUS    Collection Time: 10/12/17  2:17 AM   Result Value Ref Range    Phosphorus 1.9 (L) 2.6 - 4.7 MG/DL   GLUCOSE, POC    Collection Time: 10/12/17  5:30 AM   Result Value Ref Range    Glucose (POC) 255 (H) 65 - 100 mg/dL    Performed by Anna Marie López        Available Xrays reviewed:    Chemotherapy monitored and toxicities assessed:    Assessment and Plan   1. Hypotension. ..resolved. ... Pt. Was found unresponsive on 10/9 code blue was called, initial bp was 60/40,,,,, symptoms have now resolved,, I suspect he aspirated causing this episode  2. Locally adv. Head and neck cancer. ..  Pt. Received C#1 Taxotere/carboplatin/5FU beginning 9/29 for 4 days,,, he did well with treatment but was admitted for Mucositis on Friday. .. The mass is smaller today . Zahira Velarde and then me today that he doesn't want further chemo, doesn't want PEG tube, and wants to go home with hospice support david Pinedo MD

## 2017-10-12 NOTE — PROGRESS NOTES
CC: hypotension    S: no complaints, wants to go home today; nurse paged this morning due to high BP  O:  97.6 °F (36.4 °C) 77  192/92 125 Left arm At rest 18 99 % -- -- Alert 1     Gen: awake, NAD  HEENT: NCAT, cloudy left iris, pupils reactive, poor dentition  CVS: regular rhythm, normal rate  Lungs: CTAB anteriorly, no w/r/r  Abd: +BS, soft, NT, ND    A/P  HTN, uncontrolled  - likely due to acute issues and inability to take po meds  - increase clonidine patch to 0.3mg and wean off NTP  - IV hydralazine prn    Unresponsiveness, hypotension, hypoxia due to overnarcosis and poor oral intake  - resolved  - extubated and out of ICU 10/10, hemodynamically stable  - alert and awake    Noted plans for possible d/c home with hospice tomorrow. Rest as per primary service. Will continue to follow while inpatient.     Bernice Ghotra MD, MHS

## 2017-10-13 VITALS
TEMPERATURE: 98.1 F | DIASTOLIC BLOOD PRESSURE: 84 MMHG | OXYGEN SATURATION: 95 % | WEIGHT: 182.6 LBS | SYSTOLIC BLOOD PRESSURE: 196 MMHG | BODY MASS INDEX: 27.05 KG/M2 | HEART RATE: 97 BPM | RESPIRATION RATE: 16 BRPM | HEIGHT: 69 IN

## 2017-10-13 LAB
GLUCOSE BLD STRIP.AUTO-MCNC: 186 MG/DL (ref 65–100)
SERVICE CMNT-IMP: ABNORMAL

## 2017-10-13 PROCEDURE — 74011000250 HC RX REV CODE- 250: Performed by: INTERNAL MEDICINE

## 2017-10-13 PROCEDURE — 77010033678 HC OXYGEN DAILY

## 2017-10-13 PROCEDURE — 74011250636 HC RX REV CODE- 250/636: Performed by: INTERNAL MEDICINE

## 2017-10-13 PROCEDURE — 74011250637 HC RX REV CODE- 250/637: Performed by: HOSPITALIST

## 2017-10-13 PROCEDURE — 82962 GLUCOSE BLOOD TEST: CPT

## 2017-10-13 PROCEDURE — 94640 AIRWAY INHALATION TREATMENT: CPT

## 2017-10-13 PROCEDURE — 74011000258 HC RX REV CODE- 258: Performed by: INTERNAL MEDICINE

## 2017-10-13 RX ORDER — INSULIN GLARGINE 100 [IU]/ML
14 INJECTION, SOLUTION SUBCUTANEOUS DAILY
Status: DISCONTINUED | OUTPATIENT
Start: 2017-10-13 | End: 2017-10-13 | Stop reason: HOSPADM

## 2017-10-13 RX ORDER — FENTANYL 25 UG/1
1 PATCH TRANSDERMAL
Status: DISCONTINUED | OUTPATIENT
Start: 2017-10-13 | End: 2017-10-13 | Stop reason: HOSPADM

## 2017-10-13 RX ORDER — FENTANYL 25 UG/1
1 PATCH TRANSDERMAL
Qty: 5 PATCH | Refills: 0 | Status: SHIPPED | OUTPATIENT
Start: 2017-10-13

## 2017-10-13 RX ADMIN — FENTANYL CITRATE 25 MCG: 50 INJECTION, SOLUTION INTRAMUSCULAR; INTRAVENOUS at 03:34

## 2017-10-13 RX ADMIN — DEXTROSE MONOHYDRATE 75 ML/HR: 5 INJECTION, SOLUTION INTRAVENOUS at 02:45

## 2017-10-13 RX ADMIN — IPRATROPIUM BROMIDE 0.5 MG: 0.5 SOLUTION RESPIRATORY (INHALATION) at 09:01

## 2017-10-13 RX ADMIN — IPRATROPIUM BROMIDE 0.5 MG: 0.5 SOLUTION RESPIRATORY (INHALATION) at 02:03

## 2017-10-13 RX ADMIN — ENOXAPARIN SODIUM 40 MG: 100 INJECTION SUBCUTANEOUS at 09:26

## 2017-10-13 RX ADMIN — NITROGLYCERIN 1 INCH: 20 OINTMENT TOPICAL at 09:26

## 2017-10-13 RX ADMIN — LEVOFLOXACIN 250 MG: 5 INJECTION, SOLUTION INTRAVENOUS at 15:49

## 2017-10-13 RX ADMIN — CEFEPIME 2 G: 2 INJECTION, POWDER, FOR SOLUTION INTRAVENOUS at 13:40

## 2017-10-13 RX ADMIN — CASTOR OIL AND BALSAM, PERU: 788; 87 OINTMENT TOPICAL at 09:08

## 2017-10-13 NOTE — WOUND CARE
WOCN Note:     Follow-up visit for buttocks and sacrum. Chart shows:  Admitted for tongue cancer; history of squamous cell carcinoma  Code Blue called 10/9/17: propofol, transfer to ICU, intubated     Assessment:   Patient is communicative and able to stand almost independently from sitting in recliner for assessment. RN in room to assist.   Bed: [facility]  Patient has a Reyes. Patient reports no pain.     Sacrum and bilateral upper buttocks with intact tissue and scattered dyschromia. Venelex in use. Assisted RN to clean him of small liquid stool.      Reseated in recliner with pillow under buttocks.       Recommendations:    Venelex to sacrum and buttocks twice daily. Skin Care & Pressure Prevention:  Minimize layers of linen/pads under patient to optimize support surface. Turn/reposition approximately every 2 hours and offload heels. Manage incontinence    Discussed above plan with RN.     Transition of Care: Plan to follow weekly and as needed while admitted to hospital.    AMRITA Howard, RN, [facility]  Certified Wound, Ostomy, Continence Nurse  office [phone]  pager [phone] or call  to page

## 2017-10-13 NOTE — PROGRESS NOTES
Bedside and Verbal shift change report given to LAURI Brambila (oncoming nurse) by Meenakshi Licona RN (offgoing nurse). Report included the following information SBAR, Kardex, Intake/Output, MAR, Accordion and Recent Results.

## 2017-10-13 NOTE — PROGRESS NOTES
10:51 AM  Patient reviewed in rounds. CM notified that patient has orders for discharge. Patient to discharge home to his daughter's home  located [place] [id number] [facility], [address] [place] [address] with Hospice. [address] [place] [address] to follow for services. CM to notify [address] [place] [address] of transport time once it has been set. Contacted is Raye Ormond with [address] [place] [address] [phone]. CM contacted patient's daughter, Jimmie Knob Noster [phone] to inform of discharge orders and to coordinate transport time. CM informed by Ms. Hunter Stephenson at this time she is still awaiting for DME needed to be delivered to patient's home before arranging transport. Daughter indicated that she would follow-up with CM once items have been delivered and in place. CM will submit referral for transport and place on will call at this time. CM faxed discharge orders to [address] [place] [address] at [phone]. CM contacted [employer] to obtain authorization number for stretcher transport [id number]. Confirmation number for trip is [id number]. CM submitted a referral to [facility] via [employer] requesting for transport being placed on will call. CM will continue to follow and assist with disposition needs as they arise. 2:34 PM  CM notified that DME has been delivered to patient's daughter's home. CM contacted [facility] via [employer] to request transport. CM received notification that [facility] is able to accommodate for patient transport at 4:00 pm.  CM notified RN of update. CM informed patient's daughter and [address] [place] [address] of scheduled transport time.     KOSTAS Peters/KAYDEN

## 2017-10-13 NOTE — PROGRESS NOTES
Hematology-Oncology Progress Note    Bernadette Plant  1940  [id number]  10/13/2017    Follow-up for: head and neck cancer     [x]        Chart notes since last visit reviewed   [x]        Medications reviewed for allergies and interactions       Case discussed with the following:         []                            [x]        Nursing Staff                                                                         []        Pathologist                                                                        []        FAMILY                                                                         Subjective:     Spoke with patient who complains of: pt.  Is stable, no new problems over night, anxious to go home    Objective:     Patient Vitals for the past 24 hrs:   BP Temp Pulse Resp SpO2   10/13/17 0909 190/82 98 °F (36.7 °C) 92 16 96 %   10/13/17 0240 170/82 98.1 °F (36.7 °C) 80 16 97 %   10/13/17 0204 - - - - 97 %   10/12/17 2244 - - - - 98 %   10/12/17 2047 175/77 98.3 °F (36.8 °C) 82 16 98 %   10/12/17 1556 171/62 - - - -   10/12/17 1530 182/82 - - - -   10/12/17 1450 200/82 98.9 °F (37.2 °C) 93 18 99 %   10/12/17 1008 - - - - 99 %   10/12/17 0952 169/82 - - - -       REVIEW OF SYSTEMS:    Not obtainable    Constitutional:  Patient looks  []        Sick  []        Frail  [x]        Better                                                 []        Depressed    HEENT:  [x]   NC                         []   AT               []    ALOPECIA         2+  mucositis  Eyes: [x]   Normal               []    Icteric  Oropharynx: []    Normal                  []  Thrush               []   Dry  Mucositis: []    None                 Grade: []        I  []        II  [x]        III  []        IV  Neck:   [x]   Supple                  []  Rigid   Diminished R neck mass            JVD:    [x]   ABSENT       []   PRESENT  Lymphadenopathy:   [x]   None Noted            []   PRESENT    Chest:  [x]   Clear anteriorly    CV:             [x]   Regular              []  Irregular               []   Tachy                []   Murmur  Abdominal:   [x]    Soft              [x]   NON-tender               []   Tender      BS:    []   ABSENT                   []   PRESENT  Liver:     [x]  NON-palp                  []   EDGE- palp  Spleen: [x]   NON-palp                   []  EDGE - palp  Mass:   [x]   ABSENT                          []  PRESENT  Extr:    [x]  Lymphedema             []   Cyanosis      []  Clubbing  Edema:     [x]   NONE       []   PRESENT  Skin:  Intact [x]           Purpura []        Rash: [x]   ABSENT       []  PRESENT  Neuro:  Responds to commands, moves extremities to command      Available labs reviewed:  Labs:    Recent Results (from the past 24 hour(s))   GLUCOSE, POC    Collection Time: 10/12/17 12:50 PM   Result Value Ref Range    Glucose (POC) 200 (H) 65 - 100 mg/dL    Performed by Tej Hui    GLUCOSE, POC    Collection Time: 10/12/17  5:19 PM   Result Value Ref Range    Glucose (POC) 227 (H) 65 - 100 mg/dL    Performed by Martha Hickey    GLUCOSE, POC    Collection Time: 10/13/17  5:46 AM   Result Value Ref Range    Glucose (POC) 186 (H) 65 - 100 mg/dL    Performed by Flavia Essex        Available Xrays reviewed:    Chemotherapy monitored and toxicities assessed:    Assessment and Plan   1. Hypotension. ..resolved. ... Pt. Was found unresponsive on 10/9 code blue was called, initial bp was 60/40,,,,, symptoms have now resolved,, I suspect he aspirated causing this episode  2. Locally adv. Head and neck cancer. .. Pt. Received C#1 Taxotere/carboplatin/5FU beginning 9/29 for 4 days,,, he did well with treatment but was admitted for Mucositis on Friday. .. The mass is smaller  . Annalise Reddy  Pt. Mariama Kern  me  that he doesn't want further chemo, doesn't want PEG tube, and wants to go home with hospice support asap,,, he looks like he is ready for discharge  Lizette Ram MD

## 2017-10-13 NOTE — PROGRESS NOTES
Bedside shift change report given to [address] (oncoming nurse) by Tariq Chavis (offgoing nurse). Report included the following information SBAR, MAR and Recent Results.

## 2017-10-24 NOTE — DISCHARGE SUMMARY
[address] [address], [address] [place] [address] SUMMARY       Name:  Jose Alfredo Tanner   MR#:  [id number]   :  1940   Account #:  [de-identified]        Date of Adm:  10/06/2017       HISTORY OF PRESENT ILLNESS: The patient is a 69-year-old   gentleman with a history of locally advanced head and neck cancer,   who was admitted on 10/06/2017 by my partner, Dr. Lety Romano, for chemotherapy-induced mucositis following his first   cycle of Taxotere, carboplatin and 5-FU administered on 10/10/2017. The patient had progressive severe oral mucositis, was unable to take   in anything by mouth, including liquid or pills, and failed outpatient   management. He was admitted for the above on 10/06/2017. For   details of the H and P, please see the history and physical.    HOSPITAL COURSE: Upon admission, the patient was started on IV   fluids, Magic mouthwash, and was given p.r.n. pain medication. He   spiked a fever on 10/07/2017, and was started on IV cefepime and   vancomycin. Cultures and chest x-ray were performed. Initially, he was   also treated for renal insufficiency, which was chronic due to   preexisting diabetes and hypertension, and he received IV fluids. He   received IV potassium for his hypokalemia as well. On the morning of   10/09/2017, he appeared to be doing well with the a.m. nursing shift   evaluation, and then 1 hour later was found unresponsive at about 10   a.m. Code Blue was called, the patient was intubated and transferred   to the intensive care unit. He was seen in consultation by Dr. Liliana Fishman during this admission, who managed treatment for the renal   insufficiency. He was seen by Pulmonary Medicine, Dr. Hannah Armenta, who   managed the ventilator, and felt that the patient could be weaned   quickly, and the patient was extubated later on 10/09/2017. He spent   the night in the intensive care unit.  He was felt to be medically stable   for transfer to the floor, and on 10/10/2017, was sent back to [facility].   He had an episode of hypotension, and cardiopulmonary collapse were   thought to be related to aspiration. I arranged for a swallowing study   with speech Therapy, and unfortunately, the patient failed this   miserably. We talked to him about placing a PEG tube, and he elected   against doing so. On 10/11/2017 and then on 10/12/2017, discussions   were held with the family, as well as the patient, about hospice versus   continued supportive care, and the patient elected to go home with   hospice followup. He was discharged on 10/13/2017 in stable medical   condition. He had no discharge activity restrictions. He was advised   not to eat or drink, given the concerns for aspiration, but given the fact   that he was going into hospice, comfort measures would be observed. DISCHARGE MEDICATIONS INCLUDED   1. Isosorbide mononitrate 20 mg b.i.d.   2. Neurontin 100 mg 3 times daily. 3. Coreg 12.5 mg 2 times daily. 4. Protonix 40 mg daily. 5. Amaryl 4 mg daily. 6. Lasix 40 mg daily. 7. Percocet 1-2 tablets q.6 h. as needed. 8. Duragesic patch 25 mcg to the skin q.72 h.   9. Magic mouthwash 1 teaspoon swish and swallow as needed every 6   hours. FOLLOWUP: He will follow up with Hospice. He is not expected to be   able to make the trip back to [place] for any further MD  followup.         Maribell Munguia MD      [facility] AYAAN VAN   D:  10/24/2017   10:17   T:  10/24/2017   14:34   Job #:  [id number]

## 2019-10-29 NOTE — PROGRESS NOTES
Amantadine and amlodipine listed as historical medication from outside provider.     Ok to refill?    Please advise   Bedside shift change report given to [address] and [facility] RN (oncoming nurse) by Celeste Kuo RN (offgoing nurse). Report included the following information SBAR, Intake/Output, MAR and Recent Results.